# Patient Record
Sex: MALE | Race: WHITE | NOT HISPANIC OR LATINO | Employment: OTHER | URBAN - METROPOLITAN AREA
[De-identification: names, ages, dates, MRNs, and addresses within clinical notes are randomized per-mention and may not be internally consistent; named-entity substitution may affect disease eponyms.]

---

## 2017-06-07 ENCOUNTER — ALLSCRIPTS OFFICE VISIT (OUTPATIENT)
Dept: OTHER | Facility: OTHER | Age: 72
End: 2017-06-07

## 2017-06-09 ENCOUNTER — GENERIC CONVERSION - ENCOUNTER (OUTPATIENT)
Dept: OTHER | Facility: OTHER | Age: 72
End: 2017-06-09

## 2017-06-09 ENCOUNTER — APPOINTMENT (OUTPATIENT)
Dept: LAB | Facility: CLINIC | Age: 72
End: 2017-06-09
Payer: MEDICARE

## 2017-06-09 DIAGNOSIS — E78.00 PURE HYPERCHOLESTEROLEMIA: ICD-10-CM

## 2017-06-09 DIAGNOSIS — R73.09 OTHER ABNORMAL GLUCOSE: ICD-10-CM

## 2017-06-09 DIAGNOSIS — N40.0 ENLARGED PROSTATE WITHOUT LOWER URINARY TRACT SYMPTOMS (LUTS): ICD-10-CM

## 2017-06-09 DIAGNOSIS — I10 ESSENTIAL (PRIMARY) HYPERTENSION: ICD-10-CM

## 2017-06-09 DIAGNOSIS — Z12.5 ENCOUNTER FOR SCREENING FOR MALIGNANT NEOPLASM OF PROSTATE: ICD-10-CM

## 2017-06-09 LAB
ALBUMIN SERPL BCP-MCNC: 3.9 G/DL (ref 3.5–5)
ALP SERPL-CCNC: 81 U/L (ref 46–116)
ALT SERPL W P-5'-P-CCNC: 48 U/L (ref 12–78)
ANION GAP SERPL CALCULATED.3IONS-SCNC: 6 MMOL/L (ref 4–13)
AST SERPL W P-5'-P-CCNC: 24 U/L (ref 5–45)
BASOPHILS # BLD AUTO: 0.02 THOUSANDS/ΜL (ref 0–0.1)
BASOPHILS NFR BLD AUTO: 0 % (ref 0–1)
BILIRUB SERPL-MCNC: 0.74 MG/DL (ref 0.2–1)
BUN SERPL-MCNC: 12 MG/DL (ref 5–25)
CALCIUM SERPL-MCNC: 8.9 MG/DL (ref 8.3–10.1)
CHLORIDE SERPL-SCNC: 103 MMOL/L (ref 100–108)
CHOLEST SERPL-MCNC: 171 MG/DL (ref 50–200)
CO2 SERPL-SCNC: 30 MMOL/L (ref 21–32)
CREAT SERPL-MCNC: 1.07 MG/DL (ref 0.6–1.3)
EOSINOPHIL # BLD AUTO: 0.23 THOUSAND/ΜL (ref 0–0.61)
EOSINOPHIL NFR BLD AUTO: 4 % (ref 0–6)
ERYTHROCYTE [DISTWIDTH] IN BLOOD BY AUTOMATED COUNT: 13.2 % (ref 11.6–15.1)
GFR SERPL CREATININE-BSD FRML MDRD: >60 ML/MIN/1.73SQ M
GLUCOSE P FAST SERPL-MCNC: 110 MG/DL (ref 65–99)
HCT VFR BLD AUTO: 49.3 % (ref 36.5–49.3)
HDLC SERPL-MCNC: 35 MG/DL (ref 40–60)
HGB BLD-MCNC: 16.6 G/DL (ref 12–17)
LDLC SERPL CALC-MCNC: 118 MG/DL (ref 0–100)
LYMPHOCYTES # BLD AUTO: 1.42 THOUSANDS/ΜL (ref 0.6–4.47)
LYMPHOCYTES NFR BLD AUTO: 22 % (ref 14–44)
MCH RBC QN AUTO: 29.1 PG (ref 26.8–34.3)
MCHC RBC AUTO-ENTMCNC: 33.7 G/DL (ref 31.4–37.4)
MCV RBC AUTO: 87 FL (ref 82–98)
MONOCYTES # BLD AUTO: 0.55 THOUSAND/ΜL (ref 0.17–1.22)
MONOCYTES NFR BLD AUTO: 9 % (ref 4–12)
NEUTROPHILS # BLD AUTO: 4.16 THOUSANDS/ΜL (ref 1.85–7.62)
NEUTS SEG NFR BLD AUTO: 65 % (ref 43–75)
NRBC BLD AUTO-RTO: 0 /100 WBCS
PLATELET # BLD AUTO: 192 THOUSANDS/UL (ref 149–390)
PMV BLD AUTO: 10.6 FL (ref 8.9–12.7)
POTASSIUM SERPL-SCNC: 4 MMOL/L (ref 3.5–5.3)
PROT SERPL-MCNC: 7.6 G/DL (ref 6.4–8.2)
PSA SERPL-MCNC: 0.4 NG/ML (ref 0–4)
RBC # BLD AUTO: 5.7 MILLION/UL (ref 3.88–5.62)
SODIUM SERPL-SCNC: 139 MMOL/L (ref 136–145)
TRIGL SERPL-MCNC: 88 MG/DL
TSH SERPL DL<=0.05 MIU/L-ACNC: 2.32 UIU/ML (ref 0.36–3.74)
WBC # BLD AUTO: 6.41 THOUSAND/UL (ref 4.31–10.16)

## 2017-06-09 PROCEDURE — 80061 LIPID PANEL: CPT

## 2017-06-09 PROCEDURE — 85025 COMPLETE CBC W/AUTO DIFF WBC: CPT

## 2017-06-09 PROCEDURE — G0103 PSA SCREENING: HCPCS

## 2017-06-09 PROCEDURE — 84443 ASSAY THYROID STIM HORMONE: CPT

## 2017-06-09 PROCEDURE — 36415 COLL VENOUS BLD VENIPUNCTURE: CPT

## 2017-06-09 PROCEDURE — 80053 COMPREHEN METABOLIC PANEL: CPT

## 2017-12-07 ENCOUNTER — ALLSCRIPTS OFFICE VISIT (OUTPATIENT)
Dept: OTHER | Facility: OTHER | Age: 72
End: 2017-12-07

## 2017-12-07 DIAGNOSIS — R26.9 ABNORMALITY OF GAIT AND MOBILITY: ICD-10-CM

## 2017-12-07 DIAGNOSIS — M48.00 SPINAL STENOSIS: ICD-10-CM

## 2017-12-08 NOTE — PROGRESS NOTES
Assessment    1  Mass of skin of back (782 2) (R22 2)   2  Abscess of back (682 2) (L02 212)   3  Abnormal glucose (790 29) (R73 09)   4  Benign essential hypertension (401 1) (I10)   5  Hypercholesterolemia (272 0) (E78 00)   6  Spinal stenosis (724 00) (M48 00)   7  Gait disturbance (781 2) (R26 9)   8  Need for prophylactic vaccination and inoculation against influenza (V04 81) (Z23)    Plan  Abscess of back, Mass of skin of back    · Cephalexin 500 MG Oral Tablet (Cephalexin Monohydrate); TAKE 1 TABLET 3TIMES DAILY  Gait disturbance, Spinal stenosis    · * XR SPINE LUMBAR MINIMUM 4 VIEWS NON INJURY; Status:Active; Requestedfor:[id number]; Health Maintenance    · *VB - Urinary Incontinence Screen (Dx Z13 89 Screen for UI); Status:Complete;   Done:07Dec2017 09:26AM  Need for prophylactic vaccination and inoculation against influenza    · Fluzone High-Dose 0 5 ML Intramuscular Suspension Prefilled Syringe    * MRI Lumbar Spine Without Contrast; Status:Hold For - Scheduling; Requested for:11Nov2015;  Perform:[facility]; MSK:[id number];RNJCCEL; For:Gait disturbance, Osteoarthritis, Peripheral neuropathy, Spinal stenosis; Ordered By:Brandi Dubois;    Discussion/Summary    - Hypertension is very well-controlled on current dose of amlodipine/benazepril and metoprolol  Refills are not necessary at this time  Labs done in May were completely unremarkable  No need for labs at this time as he is not on medications that would influence his lab results  gait is significantly deteriorating  It is having an impact on his quality of life  At this time I believe that his gait dysfunction is related to a significant spinal stenosis  Patient believes that he would now be willing to take action if surgery were needed  Patient will start with x-rays of the lumbar spineHigh-dose flu vaccine provided in the office  did attempt to aspirate the skin lesion on his lower back which is 5 cm in size  There was no significant drainage  It does seem to be a cellulitic lesion that he does not have any overt pain or tenderness in this area  I will place the patient on Keflex 500 mg t i d  times 10 days  I do want to see him next week to re-evaluate the lesion  I did use a skin marker to Leonard the lesion to see if improvement occurs while on antibiotics  If there is no improvement then he would need referral to general surgeon for excision and biopsy of the lesion  The patient was counseled regarding instructions for management,-- prognosis,-- impressions,-- risks and benefits of treatment options  total time of encounter was 52 minutes-- and-- 36 minutes was spent counseling  Possible side effects of new medications were reviewed with the patient/guardian today  The treatment plan was reviewed with the patient/guardian  The patient/guardian understands and agrees with the treatment plan      Chief Complaint  Recheck and refills  Needs lab orders  Flu vaccine  Patient is here today for follow up of chronic conditions described in HPI  History of Present Illness  Patient presents with his wife for followup of chronic medical conditions including hypertension, hyperlipidemia and gait dysfunction  Patient has no particular complaints or concerns  Patient has lost weight  They do not eat very much  Patient has not been very active  His antalgic gait has worsened considerably  Patient normally spends most time on the sofa watching television  He does ambulate with the assistance of a cane  He cannot walk any significant distance due to his gait dysfunction  He denies having any lower back pain  No incontinence of bowel or bladder  He continues smoking cigars  No labs were performed recently  He would like to receive flu vaccination      Review of Systems   Constitutional: No fever or chills, feels well, no tiredness, no recent weight gain or weight loss  Eyes: No complaints of eye pain, no red eyes, no discharge from eyes, no itchy eyes    ENT: no complaints of earache, no hearing loss, no nosebleeds, no nasal discharge, no sore throat, no hoarseness  Cardiovascular: No complaints of slow heart rate, no fast heart rate, no chest pain, no palpitations, no leg claudication, no lower extremity  Respiratory: No complaints of shortness of breath, no wheezing, no cough, no SOB on exertion, no orthopnea or PND  Gastrointestinal: No complaints of abdominal pain, no constipation, no nausea or vomiting, no diarrhea or bloody stools  Genitourinary: No complaints of dysuria, no incontinence, no hesitancy, no nocturia, no genital lesion, no testicular pain  Musculoskeletal: No complaints of arthralgia, no myalgias, no joint swelling or stiffness, no limb pain or swelling  Neurological: limb weakness-- and-- difficulty walking, but-- no numbness-- and-- no tingling  Psychiatric: Is not suicidal, no sleep disturbances, no anxiety or depression, no change in personality, no emotional problems  Endocrine: No complaints of proptosis, no hot flashes, no muscle weakness, no erectile dysfunction, no deepening of the voice, no feelings of weakness  Hematologic/Lymphatic: No complaints of swollen glands, no swollen glands in the neck, does not bleed easily, no easy bruising  Preventive Quality 65 and Older: Falls Risk: The patient fell none times in the past 12 months  The patient is currently asymptomatic Symptoms Include:  Associated symptoms:  No associated symptoms are reported  The patient currently has no urinary incontinence symptoms  Over the past 2 weeks, how often have you been bothered by the following problems? 1 ) Little interest or pleasure in doing things? Not at all   2 ) Feeling down, depressed or hopeless? Not at all   3 ) Trouble falling asleep or sleeping too much? Not at all   4 ) Feeling tired or having little energy? Not at all   5 ) Poor appetite or overeating?  Not at all   6 ) Feeling bad about yourself, or that you are a failure, or have let yourself or your family down? Not at all   7 ) Trouble concentrating on things, such as reading a newspaper or watching television? Not at all   8 ) Moving or speaking so slowly that other people could have noticed, or the opposite, moving or speaking faster than usual? Not at all   9 ) Thoughts that you would be better off dead or of hurting yourself in some way? Not at all  Active Problems  1  Abnormal glucose (790 29) (R73 09)   2  Actinic keratosis (702 0) (L57 0)   3  Benign essential hypertension (401 1) (I10)   4  Benign prostatic hypertrophy without urinary obstruction (600 00) (N40 0)   5  Colon polyps (211 3) (K63 5)   6  Encounter for screening for malignant neoplasm of colon (V76 51) (Z12 11)   7  Erectile dysfunction of non-organic origin (302 72) (F52 21)   8  Gait disturbance (781 2) (R26 9)   9  Hypercholesterolemia (272 0) (E78 00)   10  Need for pneumococcal vaccination (V03 82) (Z23)   11  Need for prophylactic vaccination and inoculation against influenza (V04 81) (Z23)   12  Organic impotence (607 84) (N52 9)   13  Osteoarthritis (715 90) (M19 90)   14  Peripheral neuropathy (356 9) (G62 9)   15  Prostate cancer screening encounter, options and risks discussed (V76 44) (Z12 5)   16  Screening for abdominal aortic aneurysm (V81 2) (Z13 6)   17  Spinal stenosis (724 00) (M48 00)    Past Medical History  1  History of Cellulitis of neck (682 1) (L03 221)   2  History of Encounter for screening for malignant neoplasm of colon (V76 51) (Z12 11)   3  History of carbuncle of skin and subcutaneous tissue (V13 3) (Z87 2)   4  History of herpes zoster (V12 09) (Z86 19)   5  History of lipoma (V13 89) (Z86 018)   6  History of Impacted cerumen, unspecified laterality (380 4) (H61 20)   7  Need for prophylactic vaccination and inoculation against influenza (V04 81) (Z23)    The active problems and past medical history were reviewed and updated today  Surgical History  1   History of Cervical Vertebral Fusion    The surgical history was reviewed and updated today  Family History  Mother    1  No pertinent family history  Family History    2  Family history of Ischemic Stroke (V17 1)    The family history was reviewed and updated today  Social History     · Chewing Nicotine-containing Substances Tobacco   · Current Every Day Smoker (305 1)   · Marital History - Currently   The social history was reviewed and updated today  The social history was reviewed and is unchanged  Current Meds   1  Amlodipine Besy-Benazepril HCl - 10-40 MG Oral Capsule; 1 po daily, generic; Therapy: 17TCV0331 to (Last Rx:07Jun2017)  Requested for: 07Jun2017 Ordered   2  Aspirin 81 MG TABS; TAKE 1 TABLET DAILY; Therapy: 75MAJ3499 to (Evaluate:42Crx2935) Recorded   3  Metoprolol Tartrate 100 MG Oral Tablet; 1 two times a day; Therapy: 60UQH7160 to (Last Rx:07Jun2017)  Requested for: 07Jun2017 Ordered    The medication list was reviewed and updated today  Allergies  1  Advil TABS   2  Aspirin TABS    Vitals  Vital Signs    Recorded: 11QDW5064 09:22AM Recorded: 02ASE5224 09:21AM   Temperature 97 2 F    Heart Rate  58   Respiration  16   Systolic  063   Diastolic  72   Height  6 ft 2 in   Weight  202 lb    BMI Calculated  25 94   BSA Calculated  2 18   O2 Saturation  97       Physical Exam   Constitutional  General appearance: No acute distress, well appearing and well nourished  -- No distress whatsoever  Eyes  Conjunctiva and lids: No swelling, erythema, or discharge  Pupils and irises: Equal, round and reactive to light  Ears, Nose, Mouth, and Throat  External inspection of ears and nose: Normal    Otoscopic examination: Tympanic membrance translucent with normal light reflex  Canals patent without erythema  Nasal mucosa, septum, and turbinates: Normal without edema or erythema  Oropharynx: Normal with no erythema, edema, exudate or lesions     Pulmonary  Respiratory effort: No increased work of breathing or signs of respiratory distress  Auscultation of lungs: Clear to auscultation, equal breath sounds bilaterally, no wheezes, no rales, no rhonci  Cardiovascular  Palpation of heart: Normal PMI, no thrills  Auscultation of heart: Normal rate and rhythm, normal S1 and S2, without murmurs  Examination of extremities for edema and/or varicosities: Normal    Carotid pulses: Normal    Abdomen  Abdomen: Non-tender, no masses  Liver and spleen: No hepatomegaly or splenomegaly  Lymphatic  Palpation of lymph nodes in neck: No lymphadenopathy  Musculoskeletal  Gait and station: Abnormal   Gait evaluation demonstrated insufficiencies for exercise testing-- and-- steppage on the left, but-- no ataxia,-- no a wide-based and ataxic gait,-- no stooping,-- no shuffling,-- no inusfficiencies for an exercise program,-- no antalgia,-- no spasticity,-- no hemiparetic on the right,-- no hemiparetic on the left,-- no limping on the right,-- no limping on the left-- and-- weight bearing  Digits and nails: Normal without clubbing or cyanosis  Inspection/palpation of joints, bones, and muscles: Normal    Skin  Skin and subcutaneous tissue: Abnormal  -- Multiple actinic keratoses on the face and top of scalp  Patient with a large 3 cm lipoma on the anterior chest to the right of the sternum  Patient also has a 2 cm lipoma versus sebaceous cyst on his left lower back  Examination of the skin for lesions: Abnormal  -- When I lifted the patient sure to do his lung examination it was noted that he had a very large, 5 cm erythematous skin lesion that feels very solid in consistency  There is no overlying break in the skin  The lesion is blanchable  Neurologic  Cranial nerves: Cranial nerves 2-12 intact  Reflexes: 2+ and symmetric  Sensation: No sensory loss  Psychiatric  Orientation to person, place and time: Normal    Mood and affect: Normal    Additional Exam:   I did use an 18 gauge needle true try to aspirate at the center of the skin lesion on his back  A small amount of bloody drainage was removed  There was nothing purulent          Results/Data  Falls Risk Assessment (Dx Z13 89 Screen for Neurologic Disorder) 22SLJ4629 09:26AM User, Ahs     Test Name Result Flag Reference   Falls Risk      No falls in the past year     *VB - Urinary Incontinence Screen (Dx Z13 89 Screen for UI) [id number] 09:26AM Manoj Bishop     Test Name Result Flag Reference   Urinary Incontinence Assessment 89VWX0993         Future Appointments    Date/Time Provider Specialty Site   12/13/2017 09:40 AM Manoj Bishop DO [facility]       Signatures   Electronically signed by : Cody Robles DO; Dec  7 2017 10:51AM EST                       (Author)

## 2017-12-11 ENCOUNTER — HOSPITAL ENCOUNTER (OUTPATIENT)
Dept: RADIOLOGY | Facility: HOSPITAL | Age: 72
Discharge: HOME | End: 2017-12-11
Payer: MEDICARE

## 2017-12-11 ENCOUNTER — TRANSCRIBE ORDERS (OUTPATIENT)
Dept: ADMINISTRATIVE | Facility: HOSPITAL | Age: 72
End: 2017-12-11

## 2017-12-11 DIAGNOSIS — R26.9 ABNORMALITY OF GAIT AND MOBILITY: ICD-10-CM

## 2017-12-11 DIAGNOSIS — M48.00 SPINAL STENOSIS: ICD-10-CM

## 2017-12-11 PROCEDURE — 72110 X-RAY EXAM L-2 SPINE 4/>VWS: CPT

## 2017-12-13 ENCOUNTER — GENERIC CONVERSION - ENCOUNTER (OUTPATIENT)
Dept: OTHER | Facility: OTHER | Age: 72
End: 2017-12-13

## 2017-12-14 ENCOUNTER — GENERIC CONVERSION - ENCOUNTER (OUTPATIENT)
Dept: OTHER | Facility: OTHER | Age: 72
End: 2017-12-14

## 2017-12-14 ENCOUNTER — LAB CONVERSION - ENCOUNTER (OUTPATIENT)
Dept: OTHER | Facility: OTHER | Age: 72
End: 2017-12-14

## 2017-12-14 LAB — CULTURE RESULT (HISTORICAL): NORMAL

## 2017-12-18 ENCOUNTER — GENERIC CONVERSION - ENCOUNTER (OUTPATIENT)
Dept: OTHER | Facility: OTHER | Age: 72
End: 2017-12-18

## 2017-12-20 ENCOUNTER — LAB CONVERSION - ENCOUNTER (OUTPATIENT)
Dept: OTHER | Facility: OTHER | Age: 72
End: 2017-12-20

## 2017-12-20 ENCOUNTER — GENERIC CONVERSION - ENCOUNTER (OUTPATIENT)
Dept: OTHER | Facility: OTHER | Age: 72
End: 2017-12-20

## 2017-12-20 LAB — CULTURE RESULT (HISTORICAL): NORMAL

## 2017-12-22 ENCOUNTER — GENERIC CONVERSION - ENCOUNTER (OUTPATIENT)
Dept: OTHER | Facility: OTHER | Age: 72
End: 2017-12-22

## 2017-12-27 ENCOUNTER — GENERIC CONVERSION - ENCOUNTER (OUTPATIENT)
Dept: OTHER | Facility: OTHER | Age: 72
End: 2017-12-27

## 2018-01-02 ENCOUNTER — GENERIC CONVERSION - ENCOUNTER (OUTPATIENT)
Dept: OTHER | Facility: OTHER | Age: 73
End: 2018-01-02

## 2018-01-11 NOTE — RESULT NOTES
Discussion/Summary   Essentially normal screening labs  Fasting glucose is slightly elevated at 110  Please watch dietary intake of sweets and sugars  Excellent cholesterol  Recommend repeating blood work with annual physical in 1 year  Verified Results  (1) CBC/PLT/DIFF 50WEU5770 08:39AM [facility] Order Number: [id number]     Test Name Result Flag Reference   WBC COUNT 6 41 Thousand/uL  4 31-10 16   RBC COUNT 5 70 Million/uL H 3 88-5 62   HEMOGLOBIN 16 6 g/dL  12 0-17 0   HEMATOCRIT 49 3 %  36 5-49 3   MCV 87 fL  82-98   MCH 29 1 pg  26 8-34 3   MCHC 33 7 g/dL  31 4-37 4   RDW 13 2 %  11 6-15 1   MPV 10 6 fL  8 9-12 7   PLATELET COUNT 444 Thousands/uL  149-390   nRBC AUTOMATED 0 /100 WBCs     NEUTROPHILS RELATIVE PERCENT 65 %  43-75   LYMPHOCYTES RELATIVE PERCENT 22 %  14-44   MONOCYTES RELATIVE PERCENT 9 %  4-12   EOSINOPHILS RELATIVE PERCENT 4 %  0-6   BASOPHILS RELATIVE PERCENT 0 %  0-1   NEUTROPHILS ABSOLUTE COUNT 4 16 Thousands/? ??L  1 85-7 62   LYMPHOCYTES ABSOLUTE COUNT 1 42 Thousands/? ??L  0 60-4 47   MONOCYTES ABSOLUTE COUNT 0 55 Thousand/? ??L  0 17-1 22   EOSINOPHILS ABSOLUTE COUNT 0 23 Thousand/? ??L  0 00-0 61   BASOPHILS ABSOLUTE COUNT 0 02 Thousands/? ??L  0 00-0 10   - Patient Instructions: This bloodwork is non-fasting  Please drink two glasses of water morning of bloodwork       (1) COMPREHENSIVE METABOLIC PANEL 97LQN7610 39:25KV [facility] Order Number: [id number]     Test Name Result Flag Reference   SODIUM 139 mmol/L  136-145   POTASSIUM 4 0 mmol/L  3 5-5 3   CHLORIDE 103 mmol/L  100-108   CARBON DIOXIDE 30 mmol/L  21-32   ANION GAP (CALC) 6 mmol/L  4-13   BLOOD UREA NITROGEN 12 mg/dL  5-25   CREATININE 1 07 mg/dL  0 60-1 30   Standardized to IDMS reference method   CALCIUM 8 9 mg/dL  8 3-10 1   BILI, TOTAL 0 74 mg/dL  0 20-1 00   ALK PHOSPHATAS 81 U/L     ALT (SGPT) 48 U/L  12-78   AST(SGOT) 24 U/L  5-45   ALBUMIN 3 9 g/dL  3 5-5 0   TOTAL PROTEIN 7 6 g/dL  6 4-8 2   eGFR Non-African American      >60 0 ml/min/1 73sq m   [facility] Disease Education Program recommendations are as follows:  GFR calculation is accurate only with a steady state creatinine  Chronic Kidney disease less than 60 ml/min/1 73 sq  meters  Kidney failure less than 15 ml/min/1 73 sq  meters  GLUCOSE FASTING 110 mg/dL H 65-99     (1) TSH WITH FT4 REFLEX 38JLA6094 08:39AM Arturo Washington    Order Number: [id number]     Test Name Result Flag Reference   TSH 2 320 uIU/mL  0 358-3 740   Patients undergoing fluorescein dye angiography may retain small amounts of fluorescein in the body for 48-72 hours post procedure  Samples containing fluorescein can produce falsely depressed TSH values  If the patient had this procedure,a specimen should be resubmitted post fluorescein clearance  (1) LIPID PANEL, FASTING 09Jun2017 08:39AM [facility] Order Number: [id number]     Test Name Result Flag Reference   CHOLESTEROL 171 mg/dL     HDL,DIRECT 35 mg/dL L 40-60   Specimen collection should occur prior to Metamizole administration due to the potential for falsely depressed results  LDL CHOLESTEROL CALCULATED 118 mg/dL H 0-100   - Patient Instructions: This is a fasting blood test  Water,black tea or black  coffee only after 9:00pm the night before test   Drink 2 glasses of water the morning of test       Triglyceride:         Normal              <150 mg/dl       Borderline High    150-199 mg/dl       High               200-499 mg/dl       Very High          >499 mg/dl  Cholesterol:         Desirable        <200 mg/dl      Borderline High  200-239 mg/dl      High             >239 mg/dl  HDL Cholesterol:        High    >59 mg/dL      Low     <41 mg/dL  LDL CALCULATED:    This screening LDL is a calculated result  It does not have the accuracy of the Direct Measured LDL in the monitoring of patients with hyperlipidemia and/or statin therapy     Direct Measure LDL (BFJ486) must be ordered separately in these patients  TRIGLYCERIDES 88 mg/dL  <=150   Specimen collection should occur prior to N-Acetylcysteine or Metamizole administration due to the potential for falsely depressed results  (1) PSA (SCREEN) (Dx V76 44 Screen for Prostate Cancer) [id number] 08:39AM [facility] Number: [id number]     Test Name Result Flag Reference   PROSTATE SPECIFIC ANTIGEN 0 4 ng/mL  0 0-4 0   American U[employer] Association Guidelines define biochemical recurrence of prostate cancer as a detectable or rising PSA value post-radical prostatectomy that is greater than or equal to 0 2 ng/mL with a second confirmatory level of greater than or equal to 0 2 ng/mL  - Patient Instructions: This test is non-fasting  Please drink two glasses of water morning of bloodwork

## 2018-01-12 NOTE — PROGRESS NOTES
Assessment    1  Benign essential hypertension (401 1) (I10)   2  Prostate cancer screening encounter, options and risks discussed (V76 44) (Z12 5)   3  Hypercholesterolemia (272 0) (E78 0)   4  Abnormal glucose (790 29) (R73 09)   5  Spinal stenosis (724 00) (M48 00)   6  Screening for abdominal aortic aneurysm (V81 2) (Z13 6)   7  Screening for osteoporosis (V82 81) (Z13 820)   8  Colon polyps (211 3) (K63 5)   9  Encounter for screening for malignant neoplasm of colon (V76 51) (Z12 11)   10  Actinic keratosis (702 0) (L57 0)   11  Need for pneumococcal vaccination (V03 82) (Z23)    Plan  Abnormal glucose, Hypercholesterolemia, Prostate cancer screening encounter, options  and risks discussed    · (1) CBC/PLT/DIFF; Status:Active; Requested for:71Rgq5994;    · (1) COMPREHENSIVE METABOLIC PANEL; Status:Active; Requested for:80Evk9477;    · (1) HEMOGLOBIN A1C; Status:Active; Requested for:07Myk0439;    · (1) LIPID PANEL, FASTING; Status:Active; Requested for:88Luj1455;    · (1) PSA (SCREEN) (Dx V76 44 Screen for Prostate Cancer); Status:Active; Requested  for:14Drm3713; Actinic keratosis    · Larisa Alba MD, Tico Villatoro (Dermatology) Physician Referral  Consult  Status: Hold For - Scheduling   Requested for: 29FOB1784  Care Summary provided  : Yes  Benign essential hypertension    · Amlodipine Besy-Benazepril HCl - 10-40 MG Oral Capsule (Lotrel); 1 po daily,  generic   · Metoprolol Tartrate 100 MG Oral Tablet; 1 two times a day  Colon polyps, Encounter for screening for malignant neoplasm of colon    · 1 Jasmyn Whatley MD, Jacob Alvarenga (Gastroenterology) Physician Referral  Consult  Status: Active   Requested for: 92QUI0246  Care Summary provided  : Yes  Need for pneumococcal vaccination    · Prevnar 13 Intramuscular Suspension    Discussion/Summary    - Hypertension is very well-controlled on current dose of amlodipine/benazepril and metoprolol  Refills provided  - Order provided for repeat labs   Patient does have history of hyperlipidemia, impaired fasting glucose  -Patient's gait is significantly deteriorating  It is having an impact on his quality of life  At this time I believe that his gait dysfunction is related to a significant spinal stenosis  Order provided for MRI of the lumbar spine  Patient has had surgery in the past  He may be a good candidate for reevaluation by neurosurgery for other options as he is an otherwise healthy 43-year-old male  - Prevnar 13 vaccine provided in the office   - Referral to gastroenterology for screening colonoscopy  -Order provided for screening PSA  - Follow-up in 6 months  Impression: Initial Annual Wellness Visit, with preventive exam as well as age and risk appropriate counseling completed  Cardiovascular screening and counseling: screening is current  Diabetes screening and counseling: screening is current  Colorectal cancer screening and counseling: due for a colonoscopy (low risk)  Prostate cancer screening and counseling: screening is current and due for PSA  Osteoporosis screening and counseling: the patient declines screening  Abdominal aortic aneurysm screening and counseling: the patient declines screening  Glaucoma screening and counseling: ophthalmologist referral    HIV screening and counseling: screening not indicated  Chief Complaint  AWV      History of Present Illness  HPI: Patient presents with his wife for annual wellness visit no particular complaints or concerns  Hypertension has been very well-controlled  Patient does have history of actinic keratoses  He has not been seen by dermatology  Patient continues to have large natalya-weakness  No significant pain at all  He does have lumbar spinal stenosis but is content with ambulating with the assistance of a cane  Last colonoscopy was done in 2010 which time he did have a hyperplastic polyp  Patient is overdue for repeat colonoscopy      Welcome to Medicare and Wellness Visits:  The patient is being seen for the initial annual wellness visit  Medicare Screening and Risk Factors   Hospitalizations: no previous hospitalizations  Once per lifetime medicare screening tests: AAA screening US has not yet been done  Medicare Screening Tests Risk Questions   Osteoporosis risk assessment: none indicated  HIV risk assessment: none indicated  Drug and Alcohol Use: The patient has never smoked cigarettes  The patient reports never drinking alcohol  He has never used illicit drugs  Diet and Physical Activity: Current diet includes well balanced meals, 1 servings of fruit per day, 1 servings of vegetables per day, 1 servings of meat per day, 2 servings of whole grains per day, 1 servings of dairy products per day, 2 cups of coffee per day and 1 cans of regular soda per day  The patient does not exercise  Exercise: walking 10 minutes per day  Mood Disorder and Cognitive Impairment Screening: He denies feeling down, depressed, or hopeless over the past two weeks  He denies feeling little interest or pleasure in doing things over the past two weeks  Cognitive impairment screening: denies difficulty learning/retaining new information, denies difficulty handling complex tasks, denies difficulty with reasoning, denies difficulty with spatial ability and orientation, denies difficulty with language and denies difficulty with behavior  Functional Ability/Level of Safety: Hearing is slightly decreased, slightly decreased in the right ear, slightly decreased in the left ear and a hearing aid is not used  The patient is currently able to drive with limitations  Activities of daily living details: does not need help using the phone, no transportation help needed, does not need help shopping, no meal preparation help needed, does not need help doing housework, does not need help doing laundry, does not need help managing medications and does not need help managing money     Advance Directives: Advance directives: no living will, no durable power of  for health care directives and no advance directives  Co-Managers and Medical Equipment/Suppliers: See Patient Care Team      Patient Care Team    Care Team Member Role Specialty Office Number   Miesha Barrios 31 [phone]   Alie Washington MD  Cardiology [phone]     Review of Systems    Constitutional: negative  Eyes: negative  ENT: negative  Cardiovascular: negative  Respiratory: negative  Gastrointestinal: negative  Genitourinary: negative  Musculoskeletal: negative  Integumentary and Breasts: negative  Neurological: negative  Psychiatric: negative  Endocrine: negative  Hematologic and Lymphatic: negative  Active Problems    1  Abnormal glucose (790 29) (R73 09)   2  Actinic keratosis (702 0) (L57 0)   3  Benign essential hypertension (401 1) (I10)   4  Benign prostatic hypertrophy without urinary obstruction (600 00) (N40 0)   5  Callus (700) (L84)   6  Encounter for consultation (V65 9) (Z71 9)   7  Erectile dysfunction of non-organic origin (302 72) (F52 21)   8  Fatigue (780 79) (R53 83)   9  Gait disturbance (781 2) (R26 9)   10  Hypercholesterolemia (272 0) (E78 0)   11  Need for prophylactic vaccination and inoculation against influenza (V04 81) (Z23)   12  Organic impotence (607 84) (N52 9)   13  Osteoarthritis (715 90) (M19 90)   14  Peripheral neuropathy (356 9) (G62 9)   15  Prostate cancer screening encounter, options and risks discussed (V76 44) (Z12 5)   16  Screening for genitourinary condition (V81 6) (Z13 89)   17  Screening for neurological condition (V80 09) (Z13 89)   18   Spinal stenosis (724 00) (M48 00)    Past Medical History    · History of Cellulitis of neck (682 1) (K60 308)   · Encounter for consultation (V65 9) (Z71 9)   · History of Encounter for screening for malignant neoplasm of colon (V76 51) (Z12 11)   · History of carbuncle of skin and subcutaneous tissue (V13 3) (Z87 2)   · History of herpes zoster (V12 09) (Z86 19)   · History of lipoma (V13 89) (Z86 018)   · History of Impacted cerumen, unspecified laterality (380 4) (H61 20)   · Need for prophylactic vaccination and inoculation against influenza (V04 81) (Z23)    The active problems and past medical history were reviewed and updated today  Surgical History    · History of Cervical Vertebral Fusion    The surgical history was reviewed and updated today  Family History  Mother    · No pertinent family history  Family History    · Family history of Ischemic Stroke (V17 1)    The family history was reviewed and updated today  Social History    · Chewing Nicotine-containing Substances Tobacco   · Current Every Day Smoker (305 1)   · Marital History - Currently   The social history was reviewed and updated today  The social history was reviewed and is unchanged  Current Meds   1  Amlodipine Besy-Benazepril HCl - 10-40 MG Oral Capsule; 1 po daily, generic; Therapy: 21TOG1967 to (Last Rx:11Nov2015)  Requested for: 51ODN3735 Ordered   2  Aspirin 81 MG TABS; TAKE 1 TABLET DAILY; Therapy: 15QWT7889 to (Evaluate:43Oqo0562) Recorded   3  Metoprolol Tartrate 100 MG Oral Tablet; 1 two times a day; Therapy: 55WJZ3144 to (Last Rx:11Nov2015)  Requested for: 01LQR9921 Ordered    The medication list was reviewed and updated today  Allergies    1  Advil TABS   2  Aspirin TABS    Immunizations   ** Printed in Appendix #1 below  Vitals  Signs [Data Includes: Current Encounter]    Heart Rate: 72  Respiration: 14  Systolic: 480  Diastolic: 70  Height: 6 ft 2 in  Weight: 206 lb   BMI Calculated: 26 45  BSA Calculated: 2 2    Physical Exam    Constitutional   General appearance: No acute distress, well appearing and well nourished  Eyes   Conjunctiva and lids: No erythema, swelling or discharge  Pupils and irises: Equal, round, reactive to light  Ophthalmoscopic examination: Normal fundi and optic discs      Ears, Nose, Mouth, and Throat   External inspection of ears and nose: Normal     Otoscopic examination: Tympanic membranes translucent with normal light reflex  Canals patent without erythema  Hearing: Normal     Nasal mucosa, septum, and turbinates: Normal without edema or erythema  Lips, teeth, and gums: Normal, good dentition  Oropharynx: Normal with no erythema, edema, exudate or lesions  Neck   Neck: Supple, symmetric, trachea midline, no masses  Thyroid: Normal, no thyromegaly  Pulmonary   Respiratory effort: No increased work of breathing or signs of respiratory distress  Percussion of chest: Normal     Palpation of chest: Normal     Auscultation of lungs: Clear to auscultation  Cardiovascular   Palpation of heart: Normal PMI, no thrills  Auscultation of heart: Normal rate and rhythm, normal S1 and S2, no murmurs  Carotid pulses: 2+ bilaterally  Abdominal aorta: Normal     Femoral pulses: 2+ bilaterally  Pedal pulses: 2+ bilaterally  Examination of extremities for edema and/or varicosities: Normal     Chest   Chest: Abnormal     Abdomen   Abdomen: Non-tender, no masses  Liver and spleen: No hepatomegaly or splenomegaly  Examination for hernias: No hernias appreciated  Genitourinary   Digital rectal exam of prostate: Normal size, no masses  Lymphatic   Palpation of lymph nodes in neck: No lymphadenopathy  Palpation of lymph nodes in axillae: No lymphadenopathy  Palpation of lymph nodes in groin: No lymphadenopathy  Palpation of lymph nodes in other areas: No lymphadenopathy  Musculoskeletal   Gait and station: Abnormal   Gait evaluation demonstrated stooping and antalgia bilaterally  Inspection/palpation of digits and nails: Normal without clubbing or cyanosis      Inspection/palpation of joints, bones, and muscles: Normal     Range of motion: Normal     Stability: Normal     Muscle strength/tone: Normal     Skin   Skin and subcutaneous tissue: Normal without rashes or lesions  Examination of the skin for lesions: Abnormal   Multiple Actinic keratoses on the scalp, neck and bilateral forearms  Patient does have a large lipoma on the right anterior chest wall by the sternum  Patient does have a another large lipoma on the left to mid thoracic region of the back  Palpation of skin and subcutaneous tissue: Normal turgor  Neurologic   Cranial nerves: Cranial nerves 2-12 intact  Reflexes: 2+ and symmetric  Sensation: No sensory loss  Psychiatric   Judgment and insight: Normal     Orientation to person, place and time: Normal     Recent and remote memory: Intact  Mood and affect: Normal        Results/Data  PHQ-2 Adult Depression Screening 05KVY7765 09:11AM User, [employer]     Test Name Result Flag Reference   PHQ-2 Adult Depression Score 0     Over the last two weeks, how often have you been bothered by any of the following problems?   Little interest or pleasure in doing things: Not at all - 0  Feeling down, depressed, or hopeless: Not at all - 0   PHQ-2 Adult Depression Screening Negative         Future Appointments    Date/Time Provider Specialty Site   2016 09:20 AM Rigo Orozco DO Family Medicine [facility]     Signatures   Electronically signed by : Betito Hanson DO; May 18 2016 10:47AM EST                       (Author)    Appendix #1     Patient: Demi Randhawa SR ; : 1945; MRN: [id number]      1 2 3 4 5    Influenza  95FRP2543 00GQC7308 09GKP6111 07GSE8161 48PVN2885    Pneumococcal  30CBF2572

## 2018-01-12 NOTE — RESULT NOTES
Verified Results  (1) CBC/PLT/DIFF 56BEW3364 08:07AM Dirk Sung Order Number: [id number]   Order Number: [id number]     Test Name Result Flag Reference   WBC COUNT 6 02 Thousand/uL  4 31-10 16   RBC COUNT 5 55 Million/uL  3 88-5 62   HEMOGLOBIN 16 5 g/dL  12 0-17 0   HEMATOCRIT 48 4 %  36 5-49 3   MCV 87 fL  82-98   MCH 29 7 pg  26 8-34 3   MCHC 34 1 g/dL  31 4-37 4   RDW 13 3 %  11 6-15 1   MPV 10 8 fL  8 9-12 7   PLATELET COUNT 560 Thousands/uL  149-390   nRBC AUTOMATED 0 /100 WBCs     NEUTROPHILS RELATIVE PERCENT 66 %  43-75   LYMPHOCYTES RELATIVE PERCENT 22 %  14-44   MONOCYTES RELATIVE PERCENT 9 %  4-12   EOSINOPHILS RELATIVE PERCENT 3 %  0-6   BASOPHILS RELATIVE PERCENT 0 %  0-1   NEUTROPHILS ABSOLUTE COUNT 3 88 Thousands/?L  1 85-7 62   LYMPHOCYTES ABSOLUTE COUNT 1 35 Thousands/?L  0 60-4 47   MONOCYTES ABSOLUTE COUNT 0 56 Thousand/?L  0 17-1 22   EOSINOPHILS ABSOLUTE COUNT 0 17 Thousand/?L  0 00-0 61   BASOPHILS ABSOLUTE COUNT 0 02 Thousands/?L  0 00-0 10     (1) COMPREHENSIVE METABOLIC PANEL 91XDU5419 91:22US Dirk Sung Order Number: [id number]   Order Number: [id number] Order Number: [id number]     Test Name Result Flag Reference   GLUCOSE,RANDM 92 mg/dL     If the patient is fasting, the ADA then defines impaired fasting glucose as > 100 mg/dL and diabetes as > or equal to 123 mg/dL     SODIUM 139 mmol/L  136-145   POTASSIUM 4 9 mmol/L  3 5-5 3   CHLORIDE 104 mmol/L  100-108   CARBON DIOXIDE 29 mmol/L  21-32   ANION GAP (CALC) 6 mmol/L  4-13   BLOOD UREA NITROGEN 19 mg/dL  5-25   CREATININE 0 99 mg/dL  0 60-1 30   Standardized to IDMS reference method   CALCIUM 8 3 mg/dL  8 3-10 1   BILI, TOTAL 0 67 mg/dL  0 20-1 00   ALK PHOSPHATAS 75 U/L     ALT (SGPT) 38 U/L  12-78   AST(SGOT) 20 U/L  5-45   ALBUMIN 3 6 g/dL  3 5-5 0   TOTAL PROTEIN 6 8 g/dL  6 4-8 2   eGFR Non-African American      >60 0 ml/min/1 73sq m   [employer] [facility] Education [facility] recommendations are as follows:  GFR calculation is accurate only with a steady state creatinine  Chronic Kidney disease less than 60 ml/min/1 73 sq  meters  Kidney failure less than 15 ml/min/1 73 sq  meters  (1) HEMOGLOBIN A1C 60Rtg5966 08:07AM Katlin Nails Order Number: [id number]  TW Order Number: [id number]     Test Name Result Flag Reference   HEMOGLOBIN A1C 6 1 %  4 2-6 3   EST  AVG  GLUCOSE 128 mg/dl       (1) PSA (SCREEN) (Dx V76 44 Screen for Prostate Cancer) 32VWN5947 08:07AM Katlin Nails Order Number: [id number]  TW Order Number: [id number]     Test Name Result Flag Reference   PROSTATE SPECIFIC ANTIGEN 0 4 ng/mL  0 0-4 0     (1) LIPID PANEL, FASTING 13WJY9256 08:07AM Katlin Nails Order Number: [id number]  TW Order Number: [id number] Order Number: [id number]     Test Name Result Flag Reference   CHOLESTEROL 146 mg/dL     HDL,DIRECT 32 mg/dL L 40-60   Specimen collection should occur prior to Metamizole administration due to the potential for falsely depressed results  LDL CHOLESTEROL CALCULATED 93 mg/dL  0-100   Triglyceride:         Normal              <150 mg/dl       Borderline High    150-199 mg/dl       High               200-499 mg/dl       Very High          >499 mg/dl  Cholesterol:         Desirable        <200 mg/dl      Borderline High  200-239 mg/dl      High             >239 mg/dl  HDL Cholesterol:        High    >59 mg/dL      Low     <41 mg/dL  LDL CALCULATED:    This screening LDL is a calculated result  It does not have the accuracy of the Direct Measured LDL in the monitoring of patients with hyperlipidemia and/or statin therapy  Direct Measure LDL (ZSA601) must be ordered separately in these patients     TRIGLYCERIDES 103 mg/dL  <=150   Specimen collection should occur prior to N-Acetylcysteine or Metamizole administration due to the potential for falsely depressed results  Discussion/Summary   Screening labs are essentially normal  Cholesterol is well controlled, diabetes is well controlled  Slightly low good cholesterol  This will only increase with physical activity if possible and when he stops using his cigars  Follow-up in November as scheduled

## 2018-01-16 NOTE — PROGRESS NOTES
Plan  Benign essential hypertension    · Amlodipine Besy-Benazepril HCl - 10-40 MG Oral Capsule (Lotrel); 1 po daily,  generic   · Metoprolol Tartrate 100 MG Oral Tablet; 1 two times a day    Discussion/Summary    - Hypertension is very well-controlled on current dose of amlodipine/benazepril and metoprolol  Refills provided  - Order provided for repeat labs  Patient does have history of hyperlipidemia, impaired fasting glucose  -Patient's gait is significantly deteriorating  It is having an impact on his quality of life  At this time I believe that his gait dysfunction is related to a significant spinal stenosis  Order provided for MRI of the lumbar spine  Patient has had surgery in the past  He may be a good candidate for reevaluation by neurosurgery for other options as he is an otherwise healthy 57-year-old male  - Current on all immunizations  - Follow-up in 6 months for reevaluation of hypertension barring any significant abnormalities on blood work  Impression: Subsequent Annual Wellness Visit, with preventive exam as well as age and risk appropriate counseling completed  Cardiovascular screening and counseling: screening is current  Diabetes screening and counseling: screening is current  Colorectal cancer screening and counseling: due for a colonoscopy (low risk)  Prostate cancer screening and counseling: screening is current and due for PSA  Osteoporosis screening and counseling: the patient declines screening  Abdominal aortic aneurysm screening and counseling: the patient declines screening  Glaucoma screening and counseling: ophthalmologist referral    HIV screening and counseling: screening not indicated  Chief Complaint  AWV      History of Present Illness  HPI: Patient presents with his wife for annual wellness visit no particular complaints or concerns  Hypertension has been very well-controlled on current medication regimen  Patient does have history of actinic keratoses  Patient recently picked up a rolling walker and states that his ambulation is much improved with this  He still tends to have weakness of the left lower extremity  Patient did not have labs completed prior to this appointment  He has never had any significant abnormality on any of his blood work  He continues to chew cigars on a daily basis  He does not smoke from  He only chews to   Welcome to [employer] and Wellness Visits: The patient is being seen for the subsequent annual wellness visit  Medicare Screening and Risk Factors   Hospitalizations: no previous hospitalizations  Once per lifetime medicare screening tests: AAA screening US has not yet been done  Medicare Screening Tests Risk Questions   Osteoporosis risk assessment: none indicated  HIV risk assessment: none indicated  Drug and Alcohol Use: The patient has never smoked cigarettes  The patient reports never drinking alcohol  He has never used illicit drugs  Diet and Physical Activity: Current diet includes well balanced meals, 1 servings of fruit per day, 1 servings of vegetables per day, 1 servings of meat per day, 2 servings of whole grains per day, 1 servings of dairy products per day, 2 cups of coffee per day and 1 cans of regular soda per day  The patient does not exercise  Exercise: walking 10 minutes per day  Mood Disorder and Cognitive Impairment Screening: He denies feeling down, depressed, or hopeless over the past two weeks  He denies feeling little interest or pleasure in doing things over the past two weeks  Cognitive impairment screening: denies difficulty learning/retaining new information, denies difficulty handling complex tasks, denies difficulty with reasoning, denies difficulty with spatial ability and orientation, denies difficulty with language and denies difficulty with behavior     Functional Ability/Level of Safety: Hearing is slightly decreased, slightly decreased in the right ear, slightly decreased in the left ear and a hearing aid is not used  The patient is currently able to drive with limitations  Activities of daily living details: does not need help using the phone, no transportation help needed, does not need help shopping, no meal preparation help needed, does not need help doing housework, does not need help doing laundry, does not need help managing medications and does not need help managing money  Advance Directives: Advance directives: no living will, no durable power of  for health care directives and no advance directives  Co-Managers and Medical Equipment/Suppliers: See Patient Care Team   Preventive Quality Program 65 and Older: Falls Risk: The patient fell none times in the past 12 months  The patient is currently asymptomatic Symptoms Include:  Associated symptoms:  No associated symptoms are reported no pain from the injury  The patient currently has no urinary incontinence symptoms  Patient Care Team    Care Team Member Role Specialty Office Number   Miesha Eddy 31 [phone]   Erasmo Bradley MD  Cardiology [phone]     Review of Systems    Constitutional: negative  Eyes: negative  ENT: negative  Cardiovascular: negative  Respiratory: negative  Gastrointestinal: negative  Genitourinary: negative  Musculoskeletal: negative  Integumentary and Breasts: negative  Neurological: negative  Psychiatric: negative  Endocrine: negative  Hematologic and Lymphatic: negative  Active Problems    1  Abnormal glucose (790 29) (R73 09)   2  Actinic keratosis (702 0) (L57 0)   3  Benign essential hypertension (401 1) (I10)   4  Benign prostatic hypertrophy without urinary obstruction (600 00) (N40 0)   5  Colon polyps (211 3) (K63 5)   6  Encounter for screening for malignant neoplasm of colon (V76 51) (Z12 11)   7  Erectile dysfunction of non-organic origin (302 72) (F52 21)   8  Gait disturbance (781 2) (R26 9)   9   Hypercholesterolemia (272 0) (E78 00)   10  Need for pneumococcal vaccination (V03 82) (Z23)   11  Need for prophylactic vaccination and inoculation against influenza (V04 81) (Z23)   12  Organic impotence (607 84) (N52 9)   13  Osteoarthritis (715 90) (M19 90)   14  Peripheral neuropathy (356 9) (G62 9)   15  Prostate cancer screening encounter, options and risks discussed (V76 44) (Z12 5)   16  Screening for abdominal aortic aneurysm (V81 2) (Z13 6)   17  Spinal stenosis (724 00) (M48 00)    Past Medical History    · History of Cellulitis of neck (682 1) (T81 728)   · History of Encounter for screening for malignant neoplasm of colon (V76 51) (Z12 11)   · History of carbuncle of skin and subcutaneous tissue (V13 3) (Z87 2)   · History of herpes zoster (V12 09) (Z86 19)   · History of lipoma (V13 89) (Z86 018)   · History of Impacted cerumen, unspecified laterality (380 4) (H61 20)   · Need for prophylactic vaccination and inoculation against influenza (V04 81) (Z23)    The active problems and past medical history were reviewed and updated today  Surgical History    · History of Cervical Vertebral Fusion    The surgical history was reviewed and updated today  Family History  Mother    · No pertinent family history  Family History    · Family history of Ischemic Stroke (V17 1)    The family history was reviewed and updated today  Social History    · Chewing Nicotine-containing Substances Tobacco   · Current Every Day Smoker (305 1)   · Marital History - Currently   The social history was reviewed and updated today  The social history was reviewed and is unchanged  Current Meds   1  Amlodipine Besy-Benazepril HCl - 10-40 MG Oral Capsule; 1 po daily, generic; Therapy: 68BTF2772 to (Last Rx:[id number])  Requested for: 12OXH0017 Ordered   2  Aspirin 81 MG TABS; TAKE 1 TABLET DAILY; Therapy: 50QVE6006 to (Evaluate:42Pqj3016) Recorded   3  Metoprolol Tartrate 100 MG Oral Tablet; 1 two times a day;    Therapy: 69JSU6424 to (Last Rx:12Ebw0964)  Requested for: 12CAJ9180 Ordered    The medication list was reviewed and updated today  Allergies    1  Advil TABS   2  Aspirin TABS    Immunizations   ** Printed in Appendix #1 below  Vitals  Signs    Temperature: 96 7 F  Heart Rate: 54  Respiration: 16  Systolic: 157  Diastolic: 64  Height: 6 ft 2 in  Weight: 213 lb   BMI Calculated: 27 35  BSA Calculated: 2 23  O2 Saturation: 98    Physical Exam    Constitutional   General appearance: No acute distress, well appearing and well nourished  Eyes   Conjunctiva and lids: No erythema, swelling or discharge  Pupils and irises: Equal, round, reactive to light  Ophthalmoscopic examination: Normal fundi and optic discs  Ears, Nose, Mouth, and Throat   External inspection of ears and nose: Normal     Otoscopic examination: Tympanic membranes translucent with normal light reflex  Canals patent without erythema  Hearing: Normal     Nasal mucosa, septum, and turbinates: Normal without edema or erythema  Lips, teeth, and gums: Normal, good dentition  Oropharynx: Normal with no erythema, edema, exudate or lesions  Neck   Neck: Supple, symmetric, trachea midline, no masses  Thyroid: Normal, no thyromegaly  Pulmonary   Respiratory effort: No increased work of breathing or signs of respiratory distress  Percussion of chest: Normal     Palpation of chest: Normal     Auscultation of lungs: Clear to auscultation  Cardiovascular   Palpation of heart: Normal PMI, no thrills  Auscultation of heart: Normal rate and rhythm, normal S1 and S2, no murmurs  Carotid pulses: 2+ bilaterally  Abdominal aorta: Normal     Femoral pulses: 2+ bilaterally  Pedal pulses: 2+ bilaterally  Examination of extremities for edema and/or varicosities: Normal     Chest   Chest: Abnormal   7 cm lipoma on the right anterior chest wall overlying the right pectoralis major muscle which has not changed in size     Abdomen   Abdomen: Non-tender, no masses  Liver and spleen: No hepatomegaly or splenomegaly  Examination for hernias: No hernias appreciated  Genitourinary   Digital rectal exam of prostate: Normal size, no masses  Lymphatic   Palpation of lymph nodes in neck: No lymphadenopathy  Palpation of lymph nodes in axillae: No lymphadenopathy  Palpation of lymph nodes in groin: No lymphadenopathy  Palpation of lymph nodes in other areas: No lymphadenopathy  Musculoskeletal   Gait and station: Abnormal   Gait evaluation demonstrated stooping and antalgia bilaterally  Inspection/palpation of digits and nails: Normal without clubbing or cyanosis  Inspection/palpation of joints, bones, and muscles: Normal     Range of motion: Normal     Stability: Normal     Muscle strength/tone: Normal     Skin   Skin and subcutaneous tissue: Normal without rashes or lesions  Examination of the skin for lesions: Abnormal   Multiple Actinic keratoses on the scalp, neck and bilateral forearms  Patient does have a large lipoma on the right anterior chest wall by the sternum  Patient does have a another large lipoma on the left to mid thoracic region of the back  Palpation of skin and subcutaneous tissue: Normal turgor  Neurologic   Cranial nerves: Cranial nerves 2-12 intact  Reflexes: 2+ and symmetric  Sensation: No sensory loss  Psychiatric   Judgment and insight: Normal     Orientation to person, place and time: Normal     Recent and remote memory: Intact  Mood and affect: Normal        Results/Data  PHQ-2 Adult Depression Screening 07Jun2017 09:30AM User, Ahs     Test Name Result Flag Reference   PHQ-2 Adult Depression Score 0     Over the last two weeks, how often have you been bothered by any of the following problems?   Little interest or pleasure in doing things: Not at all - 0  Feeling down, depressed, or hopeless: Not at all - 0   PHQ-2 Adult Depression Screening Negative         Future Appointments    Date/Time Provider Specialty Site   2017 10:00 AM Reinier Kothari DO [facility]     Signatures   Electronically signed by : Phi Booth DO; 2017 10:21AM EST                       (Author)    Appendix #1     Patient: Jerzy Brenner SR ; : 1945; MRN: [id number]      1 2 3 4 5 6    Influenza  30-Sep-2010 07-Sep-2011 06-Nov-2013 14-Oct-2014 11-Nov-2015 22-Nov-2016    PCV  18-May-2016         PPSV  2010

## 2018-01-22 VITALS
RESPIRATION RATE: 16 BRPM | SYSTOLIC BLOOD PRESSURE: 126 MMHG | BODY MASS INDEX: 27.34 KG/M2 | OXYGEN SATURATION: 98 % | HEART RATE: 54 BPM | WEIGHT: 213 LBS | TEMPERATURE: 96.7 F | DIASTOLIC BLOOD PRESSURE: 64 MMHG | HEIGHT: 74 IN

## 2018-01-23 VITALS
RESPIRATION RATE: 16 BRPM | HEIGHT: 74 IN | DIASTOLIC BLOOD PRESSURE: 72 MMHG | HEART RATE: 58 BPM | OXYGEN SATURATION: 97 % | WEIGHT: 202 LBS | SYSTOLIC BLOOD PRESSURE: 136 MMHG | BODY MASS INDEX: 25.93 KG/M2 | TEMPERATURE: 97.2 F

## 2018-01-23 NOTE — RESULT NOTES
Verified Results  * XR SPINE LUMBAR MINIMUM 4 VIEWS NON INJURY [id number] 08:44AM Deborah Estrada Order Number: [id number]     Test Name Result Flag Reference   XR SPINE LUMBAR MINIMUM 4 VIEWS (Report)     LUMBAR SPINE     INDICATION: Lower back pain  COMPARISON: None     VIEWS: AP, lateral, bilateral oblique and coned down projections     IMAGES: 5     FINDINGS:     Alignment is unremarkable  There is mild anterior wedging of the T12 vertebral body which is unchanged from the prior chest x-ray of 10/17/2006  No acute fracture  There is multilevel discogenic degenerative change  Greatest degree of narrowing is at L5-S1 followed by L4-L5  Endplate spurring is seen  There is facet joint arthritis at L4-L5 and L5-S1     Atherosclerotic vascular calcifications are noted  Visualized soft tissues appear otherwise unremarkable  IMPRESSION:     Degenerative disc disease and facet joint arthritis of the lumbar spine   No subluxation or acute fracture       Workstation performed: [id number]     Signed by:   Richa Swain MD   12/14/17

## 2018-01-23 NOTE — RESULT NOTES
Verified Results  (Q) CULTURE, AEROBIC BACTERIA [id number] 01:24PM Deatrice Mean     Test Name Result Flag Reference   CULTURE, AEROBIC BACTERIA      CULTURE, AEROBIC BACTERIA       MICRO NUMBER:   [id number]   TEST STATUS:    FINAL   SPECIMEN SOURCE:  CYST, SEBACEOUS   SPECIMEN QUALITY: ADEQUATE   RESULT:      No Growth

## 2018-01-24 VITALS
SYSTOLIC BLOOD PRESSURE: 122 MMHG | BODY MASS INDEX: 25.93 KG/M2 | HEART RATE: 70 BPM | HEIGHT: 74 IN | DIASTOLIC BLOOD PRESSURE: 68 MMHG | WEIGHT: 202 LBS

## 2018-01-24 VITALS
HEART RATE: 68 BPM | DIASTOLIC BLOOD PRESSURE: 72 MMHG | RESPIRATION RATE: 16 BRPM | SYSTOLIC BLOOD PRESSURE: 126 MMHG | WEIGHT: 202 LBS | HEIGHT: 74 IN | TEMPERATURE: 97.6 F | BODY MASS INDEX: 25.93 KG/M2

## 2018-01-24 VITALS
DIASTOLIC BLOOD PRESSURE: 74 MMHG | WEIGHT: 202 LBS | BODY MASS INDEX: 25.94 KG/M2 | TEMPERATURE: 97 F | HEART RATE: 64 BPM | SYSTOLIC BLOOD PRESSURE: 128 MMHG | RESPIRATION RATE: 16 BRPM

## 2018-01-24 VITALS
RESPIRATION RATE: 16 BRPM | BODY MASS INDEX: 25.93 KG/M2 | SYSTOLIC BLOOD PRESSURE: 126 MMHG | WEIGHT: 202 LBS | DIASTOLIC BLOOD PRESSURE: 84 MMHG | TEMPERATURE: 97.4 F | HEIGHT: 74 IN

## 2018-01-24 VITALS
WEIGHT: 202 LBS | HEIGHT: 74 IN | SYSTOLIC BLOOD PRESSURE: 134 MMHG | TEMPERATURE: 97.2 F | BODY MASS INDEX: 25.93 KG/M2 | RESPIRATION RATE: 16 BRPM | HEART RATE: 60 BPM | DIASTOLIC BLOOD PRESSURE: 78 MMHG

## 2018-03-05 DIAGNOSIS — M19.90 CHRONIC ARTHRITIS: Primary | ICD-10-CM

## 2018-03-05 RX ORDER — MELOXICAM 7.5 MG/1
1 TABLET ORAL 2 TIMES DAILY
COMMUNITY
Start: 2018-01-02 | End: 2018-03-05 | Stop reason: SDUPTHER

## 2018-03-05 RX ORDER — MELOXICAM 7.5 MG/1
7.5 TABLET ORAL DAILY
Qty: 90 TABLET | Refills: 0 | Status: SHIPPED | OUTPATIENT
Start: 2018-03-05 | End: 2018-06-07 | Stop reason: SDUPTHER

## 2018-05-08 RX ORDER — METOPROLOL TARTRATE 100 MG/1
TABLET ORAL 2 TIMES DAILY
COMMUNITY
Start: 2010-03-30 | End: 2019-01-23 | Stop reason: SDUPTHER

## 2018-05-08 RX ORDER — AMLODIPINE BESYLATE AND BENAZEPRIL HYDROCHLORIDE 10; 40 MG/1; MG/1
CAPSULE ORAL
COMMUNITY
Start: 2010-03-30 | End: 2019-01-23 | Stop reason: SDUPTHER

## 2018-05-08 RX ORDER — SULFAMETHOXAZOLE AND TRIMETHOPRIM 800; 160 MG/1; MG/1
1 TABLET ORAL 2 TIMES DAILY
COMMUNITY
Start: 2017-12-13 | End: 2018-05-09

## 2018-05-09 ENCOUNTER — OFFICE VISIT (OUTPATIENT)
Dept: FAMILY MEDICINE CLINIC | Facility: CLINIC | Age: 73
End: 2018-05-09
Payer: MEDICARE

## 2018-05-09 VITALS
DIASTOLIC BLOOD PRESSURE: 78 MMHG | SYSTOLIC BLOOD PRESSURE: 142 MMHG | BODY MASS INDEX: 26.82 KG/M2 | RESPIRATION RATE: 16 BRPM | WEIGHT: 209 LBS | HEIGHT: 74 IN | HEART RATE: 80 BPM

## 2018-05-09 DIAGNOSIS — M48.062 SPINAL STENOSIS OF LUMBAR REGION WITH NEUROGENIC CLAUDICATION: Primary | ICD-10-CM

## 2018-05-09 DIAGNOSIS — R26.9 GAIT DISTURBANCE: ICD-10-CM

## 2018-05-09 PROCEDURE — 99213 OFFICE O/P EST LOW 20 MIN: CPT | Performed by: FAMILY MEDICINE

## 2018-05-09 NOTE — PROGRESS NOTES
Subjective:      Patient ID: Mehrdad Urena  is a 74 y o  male  Patient presents requesting a lumbar support brace  He saw all and info martial about a  Decompressive lumbar support brace from Dr Arian Sprague  Patient states that he contacted the manufacture in the said that with a prescription this would be covered under Medicare  Patient has had a longstanding history of significant  Gait dysfunction and spinal stenosis  In the past the patient has always been content with his quality of life ambulating with a cane  Patient denies having any significant amount of back pain but he does have great difficulty with ambulating  He finds that he needs to take frequent stops and lean forward  Past Medical History:   Diagnosis Date    Lipoma     last assessed: 1/23/2015       Family History   Problem Relation Age of Onset    No Known Problems Mother     Stroke Family      ischemic       Past Surgical History:   Procedure Laterality Date    CERVICAL FUSION      vertebral        reports that he has been smoking  His smokeless tobacco use includes Chew  Current Outpatient Prescriptions:     amLODIPine-benazepril (LOTREL) 10-40 MG per capsule, Take by mouth, Disp: , Rfl:     aspirin 81 MG tablet, Take 1 tablet by mouth daily, Disp: , Rfl:     meloxicam (MOBIC) 7 5 mg tablet, Take 1 tablet (7 5 mg total) by mouth daily, Disp: 90 tablet, Rfl: 0    metoprolol tartrate (LOPRESSOR) 100 mg tablet, Take by mouth 2 (two) times a day, Disp: , Rfl:     The following portions of the patient's history were reviewed and updated as appropriate: allergies, current medications, past family history, past medical history, past social history, past surgical history and problem list     Review of Systems   Cardiovascular: Negative for leg swelling  Genitourinary: Negative for difficulty urinating  No incontinence   Musculoskeletal: Positive for gait problem  Negative for back pain and myalgias  Neurological: Positive for weakness ( weakness of gait)  Objective:    /78   Pulse 80   Resp 16   Ht 6' 2" (1 88 m)   Wt 94 8 kg (209 lb)   BMI 26 83 kg/m²      Physical Exam   Constitutional: He appears well-developed and well-nourished  No distress  Cardiovascular: Normal rate and regular rhythm  Neurological: He is alert  He displays abnormal reflex  He exhibits abnormal muscle tone  Gait abnormal    Reflex Scores:       Patellar reflexes are 1+ on the right side  Achilles reflexes are 1+ on the right side and 1+ on the left side  No results found for this or any previous visit (from the past 1008 hour(s))  Assessment/Plan:    Spinal stenosis    Patient has had progression of his spinal stenosis clinically  I did write a prescription for a LSO brace however I informed the patient that this would be of little benefit in the long run  Patient will need surgical decompression  I did provide referral to neuro surgery for opinion and evaluation  Problem List Items Addressed This Visit     Spinal stenosis - Primary       Patient has had progression of his spinal stenosis clinically  I did write a prescription for a LSO brace however I informed the patient that this would be of little benefit in the long run  Patient will need surgical decompression  I did provide referral to neuro surgery for opinion and evaluation           Relevant Orders    Lso Back Brace    Ambulatory referral to Neurosurgery    Gait disturbance    Relevant Orders    Lso Back Brace    Ambulatory referral to Neurosurgery

## 2018-05-09 NOTE — ASSESSMENT & PLAN NOTE
Patient has had progression of his spinal stenosis clinically  I did write a prescription for a LSO brace however I informed the patient that this would be of little benefit in the long run  Patient will need surgical decompression  I did provide referral to neuro surgery for opinion and evaluation

## 2018-05-30 ENCOUNTER — APPOINTMENT (OUTPATIENT)
Dept: RADIOLOGY | Facility: CLINIC | Age: 73
End: 2018-05-30
Payer: MEDICARE

## 2018-05-30 ENCOUNTER — OFFICE VISIT (OUTPATIENT)
Dept: NEUROSURGERY | Facility: CLINIC | Age: 73
End: 2018-05-30
Payer: MEDICARE

## 2018-05-30 VITALS
SYSTOLIC BLOOD PRESSURE: 147 MMHG | BODY MASS INDEX: 27.08 KG/M2 | RESPIRATION RATE: 16 BRPM | HEART RATE: 55 BPM | WEIGHT: 211 LBS | HEIGHT: 74 IN | DIASTOLIC BLOOD PRESSURE: 77 MMHG

## 2018-05-30 DIAGNOSIS — R26.9 GAIT DISTURBANCE: ICD-10-CM

## 2018-05-30 DIAGNOSIS — M48.062 SPINAL STENOSIS OF LUMBAR REGION WITH NEUROGENIC CLAUDICATION: ICD-10-CM

## 2018-05-30 DIAGNOSIS — M47.12 OTHER SPONDYLOSIS WITH MYELOPATHY, CERVICAL REGION: ICD-10-CM

## 2018-05-30 DIAGNOSIS — M47.12 OTHER SPONDYLOSIS WITH MYELOPATHY, CERVICAL REGION: Primary | ICD-10-CM

## 2018-05-30 PROCEDURE — 72052 X-RAY EXAM NECK SPINE 6/>VWS: CPT

## 2018-05-30 PROCEDURE — 99205 OFFICE O/P NEW HI 60 MIN: CPT | Performed by: NEUROLOGICAL SURGERY

## 2018-05-30 NOTE — LETTER
May 30, 2018     Simónmirella JohnstonDO  [address]  [place] [address]    Patient: Claude Rasmussen YOB: 1945   Date of Visit: 5/30/2018       Dear Dr Cecilia Urbina:    Thank you for referring Lucio Paige to me for evaluation  Below are my notes for this consultation  If you have questions, please do not hesitate to call me  I look forward to following your patient along with you  Sincerely,        Yoko Crawford MD        CC: No Recipients  Yoko Crawford MD  5/30/2018  9:37 AM  Sign at close encounter  Office Note - Neurosurgery   Claude Rasmussen  74 y o  male MRN: [id number]      Assessment:    Patient is gradually worsening  64-year-old gentleman with progressive gait difficulty concerning for thoracic or lower cervical myelopathy  He is myelopathic and his lower extremities on physical examination and seems to have a midthoracic sensory level  Will obtain plain film of the cervical spine to better define previous surgery  We will also order MRI of the cervical spine with and without contrast and MRI of the thoracic spine without contrast and see the patient afterwards  I suspect there is some compression of the spinal cord and surgical intervention will likely be offered  Infectious or inflammatory process seems less likely based on history  I stressed the importance of following up on these studies  Given his physical examination and progressive decline in gait, he understands that without further investigation and intervention he could become quadriplegic or wheelchair dependent  History, physical examination and diagnostic tests were reviewed and questions answered  Diagnosis, care plan and treatment options were discussed  The patient, spouse/SO and family member patient and   Sister-in-law understand instructions and will follow up as directed      Plan:    Follow-up:  After tests complete    Problem List Items Addressed This Visit        Nervous and Auditory Other spondylosis with myelopathy, cervical region - Primary    Relevant Orders    XR spine cervical complete 6+ vw flex/ext/obl    MRI thoracic spine without contrast    MRI cervical spine with and without contrast       Other    Spinal stenosis    Gait disturbance    Relevant Orders    XR spine cervical complete 6+ vw flex/ext/obl    MRI thoracic spine without contrast    MRI cervical spine with and without contrast          Subjective/Objective     Chief Complaint     progressive gait difficulties  HPI      78-year-old gentleman who underwent anterior cervical surgery in 2007 for progressive gait difficulties  Afterwards his gait stabilized  Approximately 1 year ago he began to notice progressive difficulties with gait  He denies any pain or weakness in his legs but does describe some mild numbness and that his legs are quite stiff and he shuffles  He denies any pain, weakness or numbness in his arms  He denies any difficulties with bowel bladder function or changing perineal sensation  Both he and his family feel that he is cognitively doing quite well  He denies any difficulties with fine motor tasks or handwriting but does have difficulties with balance  He is right-hand dominant  He has been to physical therapy for balance training which was somewhat helpful  He presents today without any imaging for evaluation  ROS    Review of Systems   Constitutional: Negative for chills and fever  Eyes: Negative for pain and visual disturbance  Respiratory: Negative for cough, shortness of breath, wheezing and stridor  Cardiovascular: Negative for chest pain and palpitations  Musculoskeletal: Negative for arthralgias and back pain         Family History    Family History   Problem Relation Age of Onset    No Known Problems Mother     Stroke Family      ischemic       Social History    Social History     Social History    Marital status: /Civil Union     Spouse name: N/A    Number of children: N/A    Years of education: N/A     Occupational History    Not on file  Social History Main Topics    Smoking status: Current Every Day Smoker    Smokeless tobacco: Current User     Types: Chew    Alcohol use Not on file    Drug use: Unknown    Sexual activity: Not on file     Other Topics Concern    Not on file     Social History Narrative    No narrative on file       Past Medical History    Past Medical History:   Diagnosis Date    Gait disturbance 11/6/2013    Lipoma     last assessed: 1/23/2015       Surgical History    Past Surgical History:   Procedure Laterality Date    CERVICAL FUSION      vertebral       Medications      Current Outpatient Prescriptions:     amLODIPine-benazepril (LOTREL) 10-40 MG per capsule, Take by mouth, Disp: , Rfl:     aspirin 81 MG tablet, Take 1 tablet by mouth daily, Disp: , Rfl:     meloxicam (MOBIC) 7 5 mg tablet, Take 1 tablet (7 5 mg total) by mouth daily, Disp: 90 tablet, Rfl: 0    metoprolol tartrate (LOPRESSOR) 100 mg tablet, Take by mouth 2 (two) times a day, Disp: , Rfl:     Allergies    Allergies   Allergen Reactions    Aspirin     Ibuprofen      Reaction Date: [id number]; The following portions of the patient's history were reviewed and updated as appropriate: allergies, current medications, past family history, past medical history, past social history, past surgical history and problem list     Physical Exam    Vitals:  Blood pressure 147/77, pulse 55, resp  rate 16, height 6' 2" (1 88 m), weight 95 7 kg (211 lb)  ,Body mass index is 27 09 kg/m²  Physical Exam   Constitutional: He appears well-developed and well-nourished  Musculoskeletal:        Lumbar back: He exhibits decreased range of motion  Neurological: He has an abnormal Romberg Test    Reflex Scores:       Bicep reflexes are 1+ on the right side and 1+ on the left side  Patellar reflexes are 3+ on the right side and 3+ on the left side    Skin: Skin is warm and dry  Psychiatric: He has a normal mood and affect  His behavior is normal  Judgment and thought content normal    Vitals reviewed  Neurologic Exam     Motor Exam   Muscle bulk: normal  Right arm tone: normal  Left arm tone: normal  Right leg tone: spastic  Left leg tone: spastic  4/5 power in lower extremities  5/5 power in upper extremities  Sensory Exam     Diminished pinprick sensation from nipple level to lower extremities  Diminished vibration sensation diffusely in lower extremities  Diminished light touch sensation in lower extremities  Gait, Coordination, and Reflexes     Gait  Gait: shuffling and spastic    Coordination   Romberg: positive    Reflexes   Right biceps: 1+  Left biceps: 1+  Right patellar: 3+  Left patellar: 3+  Right plantar: upgoing  Left plantar: upgoing  Right Brown: absent  Left Brown: absent  Right ankle clonus: present  Left ankle clonus: present  No dysdiadochokinesia

## 2018-05-30 NOTE — PROGRESS NOTES
Office Note - Neurosurgery   Zachary Curran  74 y o  male MRN: [id number]      Assessment:    Patient is gradually worsening  64-year-old gentleman with progressive gait difficulty concerning for thoracic or lower cervical myelopathy  He is myelopathic and his lower extremities on physical examination and seems to have a midthoracic sensory level  Will obtain plain film of the cervical spine to better define previous surgery  We will also order MRI of the cervical spine with and without contrast and MRI of the thoracic spine without contrast and see the patient afterwards  I suspect there is some compression of the spinal cord and surgical intervention will likely be offered  Infectious or inflammatory process seems less likely based on history  I stressed the importance of following up on these studies  Given his physical examination and progressive decline in gait, he understands that without further investigation and intervention he could become quadriplegic or wheelchair dependent  History, physical examination and diagnostic tests were reviewed and questions answered  Diagnosis, care plan and treatment options were discussed  The patient, spouse/SO and family member patient and   Sister-in-law understand instructions and will follow up as directed  Plan:    Follow-up:  After tests complete    Problem List Items Addressed This Visit        Nervous and Auditory    Other spondylosis with myelopathy, cervical region - Primary    Relevant Orders    XR spine cervical complete 6+ vw flex/ext/obl    MRI thoracic spine without contrast    MRI cervical spine with and without contrast       Other    Spinal stenosis    Gait disturbance    Relevant Orders    XR spine cervical complete 6+ vw flex/ext/obl    MRI thoracic spine without contrast    MRI cervical spine with and without contrast          Subjective/Objective     Chief Complaint     progressive gait difficulties      HPI      64-year-old gentleman who underwent anterior cervical surgery in 2007 for progressive gait difficulties  Afterwards his gait stabilized  Approximately 1 year ago he began to notice progressive difficulties with gait  He denies any pain or weakness in his legs but does describe some mild numbness and that his legs are quite stiff and he shuffles  He denies any pain, weakness or numbness in his arms  He denies any difficulties with bowel bladder function or changing perineal sensation  Both he and his family feel that he is cognitively doing quite well  He denies any difficulties with fine motor tasks or handwriting but does have difficulties with balance  He is right-hand dominant  He has been to physical therapy for balance training which was somewhat helpful  He presents today without any imaging for evaluation  ROS    Review of Systems   Constitutional: Negative for chills and fever  Eyes: Negative for pain and visual disturbance  Respiratory: Negative for cough, shortness of breath, wheezing and stridor  Cardiovascular: Negative for chest pain and palpitations  Musculoskeletal: Negative for arthralgias and back pain  Family History    Family History   Problem Relation Age of Onset    No Known Problems Mother     Stroke Family      ischemic       Social History    Social History     Social History    Marital status: /Civil Union     Spouse name: N/A    Number of children: N/A    Years of education: N/A     Occupational History    Not on file       Social History Main Topics    Smoking status: Current Every Day Smoker    Smokeless tobacco: Current User     Types: Chew    Alcohol use Not on file    Drug use: Unknown    Sexual activity: Not on file     Other Topics Concern    Not on file     Social History Narrative    No narrative on file       Past Medical History    Past Medical History:   Diagnosis Date    Gait disturbance 11/6/2013    Lipoma     last assessed: 1/23/2015       Surgical History    Past Surgical History:   Procedure Laterality Date    CERVICAL FUSION      vertebral       Medications      Current Outpatient Prescriptions:     amLODIPine-benazepril (LOTREL) 10-40 MG per capsule, Take by mouth, Disp: , Rfl:     aspirin 81 MG tablet, Take 1 tablet by mouth daily, Disp: , Rfl:     meloxicam (MOBIC) 7 5 mg tablet, Take 1 tablet (7 5 mg total) by mouth daily, Disp: 90 tablet, Rfl: 0    metoprolol tartrate (LOPRESSOR) 100 mg tablet, Take by mouth 2 (two) times a day, Disp: , Rfl:     Allergies    Allergies   Allergen Reactions    Aspirin     Ibuprofen      Reaction Date: [id number]; The following portions of the patient's history were reviewed and updated as appropriate: allergies, current medications, past family history, past medical history, past social history, past surgical history and problem list     Physical Exam    Vitals:  Blood pressure 147/77, pulse 55, resp  rate 16, height 6' 2" (1 88 m), weight 95 7 kg (211 lb)  ,Body mass index is 27 09 kg/m²  Physical Exam   Constitutional: He appears well-developed and well-nourished  Musculoskeletal:        Lumbar back: He exhibits decreased range of motion  Neurological: He has an abnormal Romberg Test    Reflex Scores:       Bicep reflexes are 1+ on the right side and 1+ on the left side  Patellar reflexes are 3+ on the right side and 3+ on the left side  Skin: Skin is warm and dry  Psychiatric: He has a normal mood and affect  His behavior is normal  Judgment and thought content normal    Vitals reviewed  Neurologic Exam     Motor Exam   Muscle bulk: normal  Right arm tone: normal  Left arm tone: normal  Right leg tone: spastic  Left leg tone: spastic  4/5 power in lower extremities  5/5 power in upper extremities  Sensory Exam     Diminished pinprick sensation from nipple level to lower extremities  Diminished vibration sensation diffusely in lower extremities    Diminished light touch sensation in lower extremities  Gait, Coordination, and Reflexes     Gait  Gait: shuffling and spastic    Coordination   Romberg: positive    Reflexes   Right biceps: 1+  Left biceps: 1+  Right patellar: 3+  Left patellar: 3+  Right plantar: upgoing  Left plantar: upgoing  Right Brown: absent  Left Brown: absent  Right ankle clonus: present  Left ankle clonus: present  No dysdiadochokinesia

## 2018-05-31 ENCOUNTER — TELEPHONE (OUTPATIENT)
Dept: NEUROSURGERY | Facility: CLINIC | Age: 73
End: 2018-05-31

## 2018-05-31 DIAGNOSIS — M47.12 OTHER SPONDYLOSIS WITH MYELOPATHY, CERVICAL REGION: Primary | ICD-10-CM

## 2018-05-31 NOTE — TELEPHONE ENCOUNTER
Per request from [facility] radiology, script for BUN/CREAT placed

## 2018-06-07 DIAGNOSIS — M19.90 CHRONIC ARTHRITIS: ICD-10-CM

## 2018-06-07 RX ORDER — MELOXICAM 7.5 MG/1
TABLET ORAL
Qty: 90 TABLET | Refills: 0 | Status: SHIPPED | OUTPATIENT
Start: 2018-06-07 | End: 2018-07-18 | Stop reason: SDUPTHER

## 2018-06-08 ENCOUNTER — TRANSCRIBE ORDERS (OUTPATIENT)
Dept: LAB | Facility: CLINIC | Age: 73
End: 2018-06-08

## 2018-06-08 ENCOUNTER — APPOINTMENT (OUTPATIENT)
Dept: LAB | Facility: CLINIC | Age: 73
End: 2018-06-08
Payer: MEDICARE

## 2018-06-08 DIAGNOSIS — Q39.2 VERTEBRAL ABNORMALITIES, ANAL ATRESIA, CARDIAC ABNORMALITIES, TRACHEO-ESOPHAGEAL FISTULA, AND LIMB DEFECTS (VACTEL) SYNDROME: Primary | ICD-10-CM

## 2018-06-08 DIAGNOSIS — Q28.8 VERTEBRAL ABNORMALITIES, ANAL ATRESIA, CARDIAC ABNORMALITIES, TRACHEO-ESOPHAGEAL FISTULA, AND LIMB DEFECTS (VACTEL) SYNDROME: Primary | ICD-10-CM

## 2018-06-08 DIAGNOSIS — Q74.9 VERTEBRAL ABNORMALITIES, ANAL ATRESIA, CARDIAC ABNORMALITIES, TRACHEO-ESOPHAGEAL FISTULA, AND LIMB DEFECTS (VACTEL) SYNDROME: Primary | ICD-10-CM

## 2018-06-08 DIAGNOSIS — Q87.89 VERTEBRAL ABNORMALITIES, ANAL ATRESIA, CARDIAC ABNORMALITIES, TRACHEO-ESOPHAGEAL FISTULA, AND LIMB DEFECTS (VACTEL) SYNDROME: Primary | ICD-10-CM

## 2018-06-08 DIAGNOSIS — Q76.49 VERTEBRAL ABNORMALITIES, ANAL ATRESIA, CARDIAC ABNORMALITIES, TRACHEO-ESOPHAGEAL FISTULA, AND LIMB DEFECTS (VACTEL) SYNDROME: Primary | ICD-10-CM

## 2018-06-08 DIAGNOSIS — M47.12 OTHER SPONDYLOSIS WITH MYELOPATHY, CERVICAL REGION: ICD-10-CM

## 2018-06-08 DIAGNOSIS — Q42.3 VERTEBRAL ABNORMALITIES, ANAL ATRESIA, CARDIAC ABNORMALITIES, TRACHEO-ESOPHAGEAL FISTULA, AND LIMB DEFECTS (VACTEL) SYNDROME: Primary | ICD-10-CM

## 2018-06-08 LAB
BUN SERPL-MCNC: 18 MG/DL (ref 5–25)
CREAT SERPL-MCNC: 1.08 MG/DL (ref 0.6–1.3)
GFR SERPL CREATININE-BSD FRML MDRD: 67 ML/MIN/1.73SQ M

## 2018-06-08 PROCEDURE — 82565 ASSAY OF CREATININE: CPT

## 2018-06-08 PROCEDURE — 36415 COLL VENOUS BLD VENIPUNCTURE: CPT

## 2018-06-08 PROCEDURE — 84520 ASSAY OF UREA NITROGEN: CPT

## 2018-06-14 ENCOUNTER — HOSPITAL ENCOUNTER (OUTPATIENT)
Dept: MRI IMAGING | Facility: HOSPITAL | Age: 73
Discharge: HOME | End: 2018-06-14
Attending: NEUROLOGICAL SURGERY
Payer: MEDICARE

## 2018-06-14 DIAGNOSIS — R26.9 GAIT DISTURBANCE: ICD-10-CM

## 2018-06-14 DIAGNOSIS — M47.12 OTHER SPONDYLOSIS WITH MYELOPATHY, CERVICAL REGION: ICD-10-CM

## 2018-06-14 PROCEDURE — 72156 MRI NECK SPINE W/O & W/DYE: CPT

## 2018-06-14 PROCEDURE — 72146 MRI CHEST SPINE W/O DYE: CPT

## 2018-06-14 PROCEDURE — A9585 GADOBUTROL INJECTION: HCPCS | Performed by: NEUROLOGICAL SURGERY

## 2018-06-14 RX ADMIN — GADOBUTROL 9 ML: 604.72 INJECTION INTRAVENOUS at 19:15

## 2018-06-21 ENCOUNTER — OFFICE VISIT (OUTPATIENT)
Dept: NEUROSURGERY | Facility: CLINIC | Age: 73
End: 2018-06-21
Payer: MEDICARE

## 2018-06-21 VITALS
RESPIRATION RATE: 16 BRPM | HEIGHT: 74 IN | DIASTOLIC BLOOD PRESSURE: 78 MMHG | BODY MASS INDEX: 26.51 KG/M2 | SYSTOLIC BLOOD PRESSURE: 158 MMHG | WEIGHT: 206.6 LBS | HEART RATE: 72 BPM | TEMPERATURE: 97.7 F

## 2018-06-21 DIAGNOSIS — R26.9 GAIT DISTURBANCE: Primary | ICD-10-CM

## 2018-06-21 PROCEDURE — 99214 OFFICE O/P EST MOD 30 MIN: CPT | Performed by: NEUROLOGICAL SURGERY

## 2018-06-21 NOTE — PROGRESS NOTES
Office Note - Neurosurgery   Antonino Mena  74 y o  male MRN: [id number]      Assessment:    Patient is stable  47-year-old gentleman with gait difficulty and upper motor neuron findings on lower extremity examination  However, recent MRI of the cervical and thoracic spine do not any significant compression on the spinal cord  While he may have lumbar spinal stenosis, I would not expect this presentation for lower motor neuron gait dysfunction  His gait and presentation do not seem to be typical for normal pressure hydrocephalus  I will refer him to Neurology for further assessment  I wonder if he may have neuromuscular disorder contributing to some of his symptoms though it would be somewhat unusual for to be restricted to the lower extremities  I spoke personally with his PCP and there does not seem to be high risk factors for infectious etiology  Alternatively, this may represent progression of previous spinal cord injury/myelopathy with aging  I will obtain an MRI of the brain without contrast to rule out a parasagittal lesion that on occasion can present with lower extremity dysfunction  I will follow up afterwards with the patient to discuss the results  History, physical examination and diagnostic tests were reviewed and questions answered  Diagnosis, care plan and treatment options were discussed  The patient and spouse/SO understand instructions and will follow up as directed  Plan:    Follow-up:  After tests complete    Problem List Items Addressed This Visit        Other    Gait disturbance - Primary    Relevant Orders    MRI brain without contrast    Ambulatory referral to Neurology          Subjective/Objective     Chief Complaint    Gait difficulty  HPI    Patient presents for follow-up of MRI of the cervical and thoracic spine    His gait difficulties which seem to have worsened over the past year so but been present since previous anterior cervical decompression and fusion have not changed significantly  He continues to deny any pain or weakness in his legs  ROS    Review of Systems   Constitutional: Negative  HENT: Negative  Eyes: Negative  Respiratory: Negative  Cardiovascular: Negative  Gastrointestinal: Negative  Endocrine: Negative  Genitourinary: Negative  Musculoskeletal: Positive for gait problem  Skin: Negative  Allergic/Immunologic: Negative  Neurological: Negative for dizziness, seizures, syncope, weakness, numbness and headaches  Balance off     Hematological: Bruises/bleeds easily  Psychiatric/Behavioral: Negative  Family History    Family History   Problem Relation Age of Onset    No Known Problems Mother     Stroke Family         ischemic       Social History    Social History     Social History    Marital status: /Civil Union     Spouse name: N/A    Number of children: N/A    Years of education: N/A     Occupational History    Not on file       Social History Main Topics    Smoking status: Current Every Day Smoker    Smokeless tobacco: Current User     Types: Chew    Alcohol use Not on file    Drug use: Unknown    Sexual activity: Not on file     Other Topics Concern    Not on file     Social History Narrative    No narrative on file       Past Medical History    Past Medical History:   Diagnosis Date    Gait disturbance 11/6/2013    Lipoma     last assessed: 1/23/2015       Surgical History    Past Surgical History:   Procedure Laterality Date    CERVICAL FUSION      vertebral       Medications      Current Outpatient Prescriptions:     amLODIPine-benazepril (LOTREL) 10-40 MG per capsule, Take by mouth, Disp: , Rfl:     aspirin 81 MG tablet, Take 1 tablet by mouth daily, Disp: , Rfl:     meloxicam (MOBIC) 7 5 mg tablet, TAKE 1 TABLET BY MOUTH EVERY DAY, Disp: 90 tablet, Rfl: 0    metoprolol tartrate (LOPRESSOR) 100 mg tablet, Take by mouth 2 (two) times a day, Disp: , Rfl: Allergies    Allergies   Allergen Reactions    Aspirin     Ibuprofen      Reaction Date: [id number]; The following portions of the patient's history were reviewed and updated as appropriate: allergies, current medications, past family history, past medical history, past social history, past surgical history and problem list     Investigations    I personally reviewed the MRI and XRAY results with the patient:    MRI of the cervical and thoracic spine without contrast dated June 14th, 2018  Normal cervical and thoracic alignment  Mild degenerative changes  Previous ACDF within the cervical spine  No significant cervical or thoracic central canal stenosis or compression on the spinal cord  No spinal cord signal change  Foraminal stenosis at multiple levels within the cervical spine  Spinal cord is normal in signal   Visualized posterior fossa structures are unremarkable  Note is made of a sebaceous cyst over the sternum on MRI of the thoracic spine  The patient has been aware of this lesion for many years and is not changed significantly  Plain film of the cervical spine dated March 30th, 2018  Previous anterior cervical decompression and fusion  No gross instability on flexion-extension  Good bony fusion across C5-6  Physical Exam    Vitals:  Blood pressure 158/78, pulse 72, temperature 97 7 °F (36 5 °C), resp  rate 16, height 6' 2" (1 88 m), weight 93 7 kg (206 lb 9 6 oz)  ,Body mass index is 26 53 kg/m²  Physical Exam  Neurologic Exam     Motor Exam   Right leg tone: increased  Left leg tone: increased5/5 power in upper extremities  4/5 power in lower extremities       Gait, Coordination, and Reflexes     Gait  Gait: spastic

## 2018-07-12 ENCOUNTER — HOSPITAL ENCOUNTER (OUTPATIENT)
Dept: MRI IMAGING | Facility: HOSPITAL | Age: 73
Discharge: HOME | End: 2018-07-12
Attending: NEUROLOGICAL SURGERY
Payer: MEDICARE

## 2018-07-12 DIAGNOSIS — R26.9 GAIT DISTURBANCE: ICD-10-CM

## 2018-07-12 PROCEDURE — 70551 MRI BRAIN STEM W/O DYE: CPT

## 2018-07-18 ENCOUNTER — OFFICE VISIT (OUTPATIENT)
Dept: FAMILY MEDICINE CLINIC | Facility: CLINIC | Age: 73
End: 2018-07-18
Payer: MEDICARE

## 2018-07-18 VITALS
BODY MASS INDEX: 26.31 KG/M2 | TEMPERATURE: 97.2 F | HEART RATE: 52 BPM | DIASTOLIC BLOOD PRESSURE: 76 MMHG | SYSTOLIC BLOOD PRESSURE: 136 MMHG | WEIGHT: 205 LBS | RESPIRATION RATE: 16 BRPM | OXYGEN SATURATION: 95 % | HEIGHT: 74 IN

## 2018-07-18 DIAGNOSIS — E78.00 HYPERCHOLESTEROLEMIA: ICD-10-CM

## 2018-07-18 DIAGNOSIS — M19.90 CHRONIC ARTHRITIS: ICD-10-CM

## 2018-07-18 DIAGNOSIS — Z12.5 PROSTATE CANCER SCREENING: ICD-10-CM

## 2018-07-18 DIAGNOSIS — L08.9 INFECTED SEBACEOUS CYST: ICD-10-CM

## 2018-07-18 DIAGNOSIS — G62.9 PERIPHERAL POLYNEUROPATHY: ICD-10-CM

## 2018-07-18 DIAGNOSIS — R26.9 GAIT DISTURBANCE: ICD-10-CM

## 2018-07-18 DIAGNOSIS — Z23 NEED FOR SHINGLES VACCINE: ICD-10-CM

## 2018-07-18 DIAGNOSIS — L72.3 INFECTED SEBACEOUS CYST: ICD-10-CM

## 2018-07-18 DIAGNOSIS — Z00.00 MEDICARE ANNUAL WELLNESS VISIT, SUBSEQUENT: Primary | ICD-10-CM

## 2018-07-18 DIAGNOSIS — Z13.89 PROTEIN SCREENING: ICD-10-CM

## 2018-07-18 DIAGNOSIS — R73.09 ABNORMAL GLUCOSE: ICD-10-CM

## 2018-07-18 DIAGNOSIS — I10 BENIGN ESSENTIAL HYPERTENSION: ICD-10-CM

## 2018-07-18 DIAGNOSIS — M47.12 OTHER SPONDYLOSIS WITH MYELOPATHY, CERVICAL REGION: ICD-10-CM

## 2018-07-18 PROCEDURE — G0439 PPPS, SUBSEQ VISIT: HCPCS | Performed by: FAMILY MEDICINE

## 2018-07-18 RX ORDER — MELOXICAM 7.5 MG/1
7.5 TABLET ORAL DAILY
Qty: 90 TABLET | Refills: 3 | Status: SHIPPED | OUTPATIENT
Start: 2018-07-18 | End: 2019-01-23 | Stop reason: SDUPTHER

## 2018-07-18 NOTE — ASSESSMENT & PLAN NOTE
Appreciated consultation with Neurosurgery  Patient will be seeing Neurology tomorrow as well in consultation    No definitive etiology noted

## 2018-07-18 NOTE — PROGRESS NOTES
Assessment and Plan:    Problem List Items Addressed This Visit     None        Health Maintenance Due   Topic Date Due    Depression Screening PHQ-9  1945    CRC Screening: Colonoscopy  1945    DTaP,Tdap,and Td Vaccines (1 - Tdap) 11/04/1964    Fall Risk  11/04/2008    ABDOMINAL AORTIC ANEURYSM (AAA) SCREEN  11/04/2008    GLAUCOMA SCREENING 65 + YR  11/04/2010         HPI:  Dee Fish  is a 74 y o  male here for his Subsequent Wellness Visit  Patient Active Problem List   Diagnosis    Spinal stenosis    Peripheral neuropathy    Gait disturbance    Other spondylosis with myelopathy, cervical region     Past Medical History:   Diagnosis Date    Gait disturbance 11/6/2013    Lipoma     last assessed: 1/23/2015     Past Surgical History:   Procedure Laterality Date    CERVICAL FUSION      vertebral     Family History   Problem Relation Age of Onset    No Known Problems Mother     Stroke Family         ischemic     History   Smoking Status    Current Every Day Smoker   Smokeless Tobacco    Current User    Types: Chew     History   Alcohol use Not on file      History   Drug use: Unknown       Current Outpatient Prescriptions   Medication Sig Dispense Refill    amLODIPine-benazepril (LOTREL) 10-40 MG per capsule Take by mouth      aspirin 81 MG tablet Take 1 tablet by mouth daily      meloxicam (MOBIC) 7 5 mg tablet TAKE 1 TABLET BY MOUTH EVERY DAY 90 tablet 0    metoprolol tartrate (LOPRESSOR) 100 mg tablet Take by mouth 2 (two) times a day       No current facility-administered medications for this visit        Allergies   Allergen Reactions    Aspirin     Ibuprofen      Reaction Date: [id number];      Immunization History   Administered Date(s) Administered    Influenza Split High Dose Preservative Free IM 11/06/2013, 10/14/2014, 11/11/2015, 11/22/2016, 12/07/2017    Influenza TIV (IM) 09/30/2010, 09/07/2011    Pneumococcal Conjugate 13-Valent 05/18/2016    Pneumococcal Polysaccharide PPV23 04/29/2010       Patient Care Team:  Joseline Washburn DO as PCP - Zee Galarza MD      Medicare Screening Tests and Risk Assessments:  AWV Clinical     ISAR:       Once in a Lifetime Medicare Screening:       Medicare Screening Tests and Risk Assessment:   AAA Risk Assessment    Osteoporosis Risk Assessment    HIV Risk Assessment        Drug and Alcohol Use:   Tobacco use    Tobacco use duration    Tobacco Cessation Readiness    Alcohol use    Alcohol Treatment Readiness   Illicit Drug Use        Diet & Exercise:   Diet   How many servings a day of the following:   Exercise        Cognitive Impairment Screening:   Cognitive Impairment Screening        Functional Ability/Level of Safety:   Hearing    Hearing Impairment Assessment    Current Activities    Help needed with the folllowing:    ADL    Fall Risk   Injury History       Home Safety:   Home Safety Risk Factors       Advanced Directives:   Advanced Directives    Patient's End of Life Decisions        Urinary Incontinence:       Glaucoma:            Provider Screening    No data filed

## 2018-07-18 NOTE — ASSESSMENT & PLAN NOTE
Well controlled on Lopressor 100 mg twice daily and Lotrel 10/40 once daily    Refills not necessary at this time

## 2018-07-18 NOTE — PROGRESS NOTES
Subjective:      Patient ID: Zachary Curran  is a 74 y o  male  Patient presents for subsequent annual wellness visit as well as follow-up of chronic conditions including hypertension, gait disturbance  Patient did see neurosurgeon in regards to his gait disturbance  He did have an MRI of the cervical thoracic spine  No definitive impingement noted  MRI of the brain was completely unremarkable  Patient will be seeing neurologist in consultation at the request of Neurosurgery  Patient is always had difficulty with his gait ever since sustaining a neck injury which required cervical fusion some years ago  Patient denies pain  Simply has weakness when walking  Past Medical History:   Diagnosis Date    Gait disturbance 11/6/2013    Lipoma     last assessed: 1/23/2015       Family History   Problem Relation Age of Onset    No Known Problems Mother     Stroke Family         ischemic       Past Surgical History:   Procedure Laterality Date    CERVICAL FUSION      vertebral        reports that he has been smoking  His smokeless tobacco use includes Chew  Current Outpatient Prescriptions:     amLODIPine-benazepril (LOTREL) 10-40 MG per capsule, Take by mouth, Disp: , Rfl:     aspirin 81 MG tablet, Take 1 tablet by mouth daily, Disp: , Rfl:     meloxicam (MOBIC) 7 5 mg tablet, TAKE 1 TABLET BY MOUTH EVERY DAY, Disp: 90 tablet, Rfl: 0    metoprolol tartrate (LOPRESSOR) 100 mg tablet, Take by mouth 2 (two) times a day, Disp: , Rfl:     The following portions of the patient's history were reviewed and updated as appropriate: allergies, current medications, past family history, past medical history, past social history, past surgical history and problem list     Review of Systems   HENT: Negative  Eyes: Negative  Respiratory: Negative  Cardiovascular: Negative  Gastrointestinal: Negative  Endocrine: Negative  Genitourinary: Negative      Musculoskeletal: Positive for gait problem  Negative for arthralgias, back pain, joint swelling and myalgias  Skin: Negative  Allergic/Immunologic: Negative  Neurological: Positive for weakness  Negative for tremors  Hematological: Negative  Psychiatric/Behavioral: Negative  All other systems reviewed and are negative  Objective:    /76   Pulse (!) 52   Temp (!) 97 2 °F (36 2 °C) (Tympanic)   Resp 16   Ht 6' 2" (1 88 m)   Wt 93 kg (205 lb)   SpO2 95%   BMI 26 32 kg/m²      Physical Exam   Constitutional: He is oriented to person, place, and time  He appears well-developed and well-nourished  No distress  HENT:   Head: Normocephalic  Eyes: Conjunctivae and EOM are normal  Pupils are equal, round, and reactive to light  Neck: Normal range of motion  Neck supple  Cardiovascular: Normal rate, regular rhythm and normal heart sounds  Pulmonary/Chest: Effort normal and breath sounds normal    Abdominal: Soft  Bowel sounds are normal    Musculoskeletal: Normal range of motion  Neurological: He is alert and oriented to person, place, and time  He displays abnormal reflex  He exhibits abnormal muscle tone  Gait abnormal    Reflex Scores:       Patellar reflexes are 1+ on the right side  Achilles reflexes are 1+ on the right side and 1+ on the left side  Skin:        1 cm  sebaceous cyst at the right upper flank   Psychiatric: He has a normal mood and affect  His behavior is normal  Judgment and thought content normal    Nursing note and vitals reviewed          Recent Results (from the past 1008 hour(s))   BUN    Collection Time: 06/08/18  8:45 AM   Result Value Ref Range    BUN 18 5 - 25 mg/dL   Creatinine, serum    Collection Time: 06/08/18  8:45 AM   Result Value Ref Range    Creatinine 1 08 0 60 - 1 30 mg/dL    eGFR 67 ml/min/1 73sq m     Incision and Drainage  Date/Time: 7/18/2018 2:10 PM  Performed by: Fany Hankins by: Lucy Smith     Patient location:  Clinic  Consent:     Consent obtained:  Verbal    Consent given by:  Patient and spouse    Risks discussed:  Bleeding, incomplete drainage, pain and infection    Alternatives discussed:  No treatment  Location:     Type:  Cyst    Size:  2cm    Location:  Trunk    Trunk location:  Back  Pre-procedure details:     Skin preparation:  Betadine  Anesthesia (see MAR for exact dosages): Anesthesia method:  None  Procedure details:     Complexity:  Simple    Needle aspiration: yes      Needle size:  18 G    Approach:  Puncture    Incision depth:  Subcutaneous    Wound management:  Probed and deloculated    Drainage characteristics: Sebaceous  Foul-smelling  Drainage amount:  Scant    Wound treatment:  Wound left open    Packing materials:  None  Post-procedure details:     Patient tolerance of procedure: Tolerated well, no immediate complications      Assessment/Plan:    No problem-specific Assessment & Plan notes found for this encounter  Problem List Items Addressed This Visit     Peripheral neuropathy    Relevant Orders    Comprehensive metabolic panel    Gait disturbance    Relevant Orders    TSH, 3rd generation with Free T4 reflex    Other spondylosis with myelopathy, cervical region      Appreciated consultation with Neurosurgery  Patient will be seeing Neurology tomorrow as well in consultation  No definitive etiology noted         Prostate cancer screening      Screening PSA ordered         Medicare annual wellness visit, subsequent - Primary    Abnormal glucose    Benign essential hypertension       Well controlled on Lopressor 100 mg twice daily and Lotrel 10/40 once daily  Refills not necessary at this time         Relevant Orders    CBC and differential    TSH, 3rd generation with Free T4 reflex    Infected sebaceous cyst      Needle incision performed and sebaceous material expressed  Patient tolerated this well    If this does become larger area will need to be incised and drained further         Relevant Orders Incision and Drainage    Hypercholesterolemia      Not presently on medications    Check lipid profile         Relevant Orders    Lipid panel    Need for shingles vaccine      Order provided for shingrix         Relevant Medications    Zoster Vac Recomb Adjuvanted ([address]) 50 MCG SUSR      Other Visit Diagnoses     Chronic arthritis        Relevant Medications    meloxicam (MOBIC) 7 5 mg tablet    Protein screening        Relevant Orders    PSA, total and free

## 2018-07-18 NOTE — ASSESSMENT & PLAN NOTE
Needle incision performed and sebaceous material expressed  Patient tolerated this well    If this does become larger area will need to be incised and drained further

## 2018-07-19 ENCOUNTER — OFFICE VISIT (OUTPATIENT)
Dept: NEUROLOGY | Facility: CLINIC | Age: 73
End: 2018-07-19
Payer: MEDICARE

## 2018-07-19 VITALS
SYSTOLIC BLOOD PRESSURE: 142 MMHG | WEIGHT: 205.03 LBS | BODY MASS INDEX: 26.32 KG/M2 | DIASTOLIC BLOOD PRESSURE: 78 MMHG | HEART RATE: 70 BPM

## 2018-07-19 DIAGNOSIS — R26.9 GAIT DISTURBANCE: Primary | ICD-10-CM

## 2018-07-19 PROCEDURE — 99203 OFFICE O/P NEW LOW 30 MIN: CPT | Performed by: PSYCHIATRY & NEUROLOGY

## 2018-07-19 NOTE — PROGRESS NOTES
Patient ID: Ned Cousin  is a 74 y o  male  hereditary spastic paraparesis    The patient presents with a duration of symptoms and progression very consistent with predominantly in upper motor neuron selective dysfunction of her Ed Jessenia spastic paraparesis  This disorder does not have to be distinctly hereditary and multiple sporadic cases occur      I am going to obtain electrodiagnostic studies to determine if there is any lower motor neuron dysfunction and also asked my colleague in neuromuscular Disease to evaluate the patient  Subjective: The patient has a progressive dysfunction of gait with spasticity  He noticed it approximately 10-11 years ago with difficulty walking with stiffness  He noticed no problems with his arms  He has cervical degenerative disease with prior cervical spine surgery  Recently an MRI scan of the cervical and thoracic spine failed to reveal any spinal cord changes and the patient was referred because of the duration and progression of his symptoms  He still can ambulate although uses a cane has had no bladder bowel dysfunction and absolutely no one in the family with similar dysfunction  There is no in the family uses a cane or a wheelchair there has been no spinal or back trauma no involvement of the hands or arms or trunk has been noted progression has been predominantly painless      HPI       Objective: There were no vitals taken for this visit  Physical Exam    Neurological Exam notable for distinct spasticity in the lower extremities with mild weakness and notable hyperreflexia and bilateral extensor responses no sensory level was noted and no sensory loss was appreciated      ROS:    Review of Systems   HENT: Negative  Eyes: Negative  Respiratory: Negative  Cardiovascular: Negative  Gastrointestinal: Negative  Endocrine: Negative  Musculoskeletal: Positive for gait problem  Skin: Negative  Allergic/Immunologic: Negative  Neurological: Positive for weakness and numbness (Cant feel forearm )  Hematological: Negative  Psychiatric/Behavioral: Negative

## 2018-08-20 ENCOUNTER — HOSPITAL ENCOUNTER (OUTPATIENT)
Dept: NEUROLOGY | Facility: CLINIC | Age: 73
Discharge: HOME | End: 2018-08-20
Payer: MEDICARE

## 2018-08-20 DIAGNOSIS — R26.9 GAIT DISTURBANCE: ICD-10-CM

## 2018-08-20 PROCEDURE — 95887 MUSC TST DONE W/N TST NONEXT: CPT

## 2018-08-20 PROCEDURE — 95886 MUSC TEST DONE W/N TEST COMP: CPT | Performed by: PSYCHIATRY & NEUROLOGY

## 2018-08-20 PROCEDURE — 95912 NRV CNDJ TEST 11-12 STUDIES: CPT

## 2018-08-20 PROCEDURE — 95912 NRV CNDJ TEST 11-12 STUDIES: CPT | Performed by: PSYCHIATRY & NEUROLOGY

## 2018-08-20 PROCEDURE — 95886 MUSC TEST DONE W/N TEST COMP: CPT

## 2018-08-20 PROCEDURE — 95887 MUSC TST DONE W/N TST NONEXT: CPT | Performed by: PSYCHIATRY & NEUROLOGY

## 2018-09-05 ENCOUNTER — TELEPHONE (OUTPATIENT)
Dept: NEUROLOGY | Facility: CLINIC | Age: 73
End: 2018-09-05

## 2018-09-05 NOTE — TELEPHONE ENCOUNTER
Discussed with Dr Maxwell David  ?HSP  Called patient's wife to discuss and set up f/u with Dr Moses Sanchez per her  Patient's wife agreeable  Fwd'ed to VITOR Cooper to schedule

## 2018-09-27 ENCOUNTER — OFFICE VISIT (OUTPATIENT)
Dept: NEUROLOGY | Facility: CLINIC | Age: 73
End: 2018-09-27
Payer: MEDICARE

## 2018-09-27 VITALS
SYSTOLIC BLOOD PRESSURE: 158 MMHG | BODY MASS INDEX: 26.31 KG/M2 | DIASTOLIC BLOOD PRESSURE: 78 MMHG | WEIGHT: 205 LBS | HEIGHT: 74 IN | HEART RATE: 79 BPM

## 2018-09-27 DIAGNOSIS — G11.4 HEREDITARY SPASTIC PARAPLEGIA (HCC): Primary | ICD-10-CM

## 2018-09-27 PROCEDURE — 99215 OFFICE O/P EST HI 40 MIN: CPT | Performed by: PSYCHIATRY & NEUROLOGY

## 2018-09-27 RX ORDER — BACLOFEN 10 MG/1
10 TABLET ORAL 3 TIMES DAILY
Qty: 90 TABLET | Refills: 3 | Status: SHIPPED | OUTPATIENT
Start: 2018-09-27 | End: 2019-01-23

## 2018-09-27 NOTE — LETTER
September 27, 2018     Sameerlindsay Liriano DO  [address]  [facility] [place] [address]    Patient: Christine Carver YOB: 1945   Date of Visit: 9/27/2018       Dear Dr Zari Garcia:    Thank you for referring Bulmaro Landers to me for evaluation  Below are my notes for this consultation  If you have questions, please do not hesitate to call me  I look forward to following your patient along with you  Sincerely,        Parish Tate MD        CC: MD Shine Ramirez MD Mose Cruise, MD  9/27/2018  9:44 PM  Sign at close encounter  Patient ID: Christine Carver  is a 74 y o  male  Assessment/Plan:    Hereditary spastic paraplegia (HCC)  I believe this patient has spastic paraplegia, this would be supported by his spasticity in the lower extremities, minimal sensory loss in the feet, essentially normal  imaging of the brain as well as his spine, and normal electrodiagnostic studies with poor activation suggesting a predominantly upper motor neuron process  His examination in the upper extremities is completely normal except for slightly brisk reflexes which can be seen in uncomplicated spastic paraplegia, and he does have some bladder and bowel urgency as well, consistent with the diagnosis  I had a lengthy discussion today with the patient and his family explaining the diagnosis, the workup which essentially involves ruling out any other structural abnormality, and further treatment process, which compromised most of my today's 45 minutes visit  I explained to them that unfortunately, there is no cure for this condition, and treatment is mainly supportive care  We discussed anti spasticity medications such as baclofen that we can use to improve some of the spasticity  I provided him with a prescription for 10 mg 3 times a day, he will start with 5 mg to try 3 times a day and gradually increase the dose as he tolerates it    We also discussed the option of baclofen pump, which he will think about and would contact me if he thinks that is an option for him  I stressed the importance of stretching on a daily basis, and also provided them with a prescription for aquatherapy  Besides this, we also discussed the option of power mobility device such as a power scooter in the long run for him  In general, he walks with a cane, but does have a walker for any longer distances,  However it is getting harder for him to walk considerable distances without getting tired  We talked about the possibility of a transport chair from now, and we can consider a power scooter in the future  Patient and his wife have my contact information for any questions or concerns, other than that I will see them back in 3 months for return visit  Thank you very much for allowing me to participate in his care  Diagnoses and all orders for this visit:    Hereditary spastic paraplegia (HCC)  -     baclofen 10 mg tablet; Take 1 tablet (10 mg total) by mouth 3 (three) times a day for 30 days           Subjective:    HPI    I had the pleasure of seeing your patient in [facility] today  As you know, he is a 24-year-old man who was referred for evaluation for spasticity in the lower extremities  Please allow me to summarize history for the record  He was seen by Dr Elizabeth Corrales in July 2018  As you know, he has history of cervical spine surgery in 2007 for progressive ambulatory difficulties  After the surgery his gait stabilize up until about a year ago, when he started noticing difficulty walking  He describes his legs to be very stiff and he tends to shuffle  He does have mild numbness in the feet but denies any significant lower back pain, or weakness in his lower extremities  He denies any difficulties with the bladder bowel, denies change in his perineal sensation  There has not been any difficulty with his hands, however balance has been significantly affected      Thinking back, he has had symptoms since his spine surgery in 2007, symptoms started a year prior to the surgery  Can go up a flight of stairs, uses the handrail  Coming down is ok  No change in weight  No fasciculations  Have some urinary urgency, and some urgency with bowels as well  No muscle cramps or spasms  As a part of his workup, he had MRI of the thoracic spine which showed mild degenerative disease without any significant spinal canal stenosis or cord compression  MRI of the cervical spine showed multilevel spondylosis as well without any cord compression or abnormal enhancement  He does have moderate bilateral neuroforaminal narrowing at C3-4 and C4-5 levels  MRI of the brain was essentially normal except for mild age-appropriate atrophy  He had an EMG with me on August 20th, which was essentially a normal study for his age ,however poor activation was noted in multiple tested muscles suggesting a predominantly upper motor neuron process  The following portions of the patient's history were reviewed and updated as appropriate: allergies, current medications, past family history, past medical history, past social history, past surgical history and problem list     No family hx of similar issues in any of the family member s No one in family was wheelchair /walker to ambulate  He is using a cane to ambulate  Objective:    Blood pressure 158/78, pulse 79, height 6' 2" (1 88 m), weight 93 kg (205 lb)  Physical Exam  General exam: Pt was awake, alert and oriented  HEENT: atraumatic, normocephalic  Normal oral mucosa, neck was supple, no lymphadenopathy  Normal peripheral pulses  Extremities did not show any edema or cyanosis  Neurological Exam  Neurologically, pt was awake and alert  Speech was normal, no dysarthria or aphasia  Cranial nerve exam showed normal extraocular movements, no nystagmus or diplopia  There was no ptosis at baseline or with sustained upward gaze     Strength of eye closure muscles was normal   Facial sensations were normal bilaterally  No facial weakness, able to blow out the cheeks and push the tongue in the cheeks well  No tongue atrophy or fasciculations  Motor exam revealed normal tone in the upper extremities, increased tone in both lower extremities, with tightening at the Achilles tendons  Muscle bulk was normal throughout in both upper and lower extremities  Muscle strength was normal in neck flexors and extensors and all muscle groups in both upper extremities  Muscle strength was full in all muscle groups in both lower extremities as well, except right hip flexors which were graded as 4+/5, however all the movements in both lower extremities were performed in a very slow and poorly coordinated fashion  Reflexes were graded as 3 in the upper extremities, 3+ at both knees and ankles with sustained clonus at both ankles  There was no exaggerated jaw jerk or carmona's sign  Sensory exam revealed minimal length dependent, decreased sensation to pin and temp up to ankles  Vibration was mildly reduced at toes  Proprioception was intact  He walks with the help of a cane, and has a spastic gait  ROS:  I reviewed the below ROS and what is mentioned in HPI, the remainder of ROS was negative  Review of Systems   Constitutional: Negative  Negative for appetite change and fever  HENT: Negative  Negative for hearing loss, tinnitus, trouble swallowing and voice change  Eyes: Negative  Negative for photophobia and pain  Respiratory: Negative  Negative for shortness of breath  Cardiovascular: Negative  Negative for palpitations  Gastrointestinal: Negative  Negative for nausea  Endocrine: Negative  Negative for cold intolerance and heat intolerance  Genitourinary: Negative  Negative for dysuria, frequency and urgency  Musculoskeletal: Positive for gait problem (balance problems, difficulty walking)   Negative for myalgias and neck pain    Skin: Negative  Allergic/Immunologic: Negative  Neurological: Negative for dizziness, tremors, seizures, syncope, facial asymmetry, speech difficulty, weakness and numbness  Hematological: Negative  Does not bruise/bleed easily  Psychiatric/Behavioral: Negative for confusion and hallucinations

## 2018-09-27 NOTE — PROGRESS NOTES
Patient ID: Kt Adams  is a 74 y o  male  Assessment/Plan:    Hereditary spastic paraplegia (HCC)  I believe this patient has spastic paraplegia, this would be supported by his spasticity in the lower extremities, minimal sensory loss in the feet, essentially normal  imaging of the brain as well as his spine, and normal electrodiagnostic studies with poor activation suggesting a predominantly upper motor neuron process  His examination in the upper extremities is completely normal except for slightly brisk reflexes which can be seen in uncomplicated spastic paraplegia, and he does have some bladder and bowel urgency as well, consistent with the diagnosis  I had a lengthy discussion today with the patient and his family explaining the diagnosis, the workup which essentially involves ruling out any other structural abnormality, and further treatment process, which compromised most of my today's 45 minutes visit  I explained to them that unfortunately, there is no cure for this condition, and treatment is mainly supportive care  We discussed anti spasticity medications such as baclofen that we can use to improve some of the spasticity  I provided him with a prescription for 10 mg 3 times a day, he will start with 5 mg to try 3 times a day and gradually increase the dose as he tolerates it  We also discussed the option of baclofen pump, which he will think about and would contact me if he thinks that is an option for him  I stressed the importance of stretching on a daily basis, and also provided them with a prescription for aquatherapy  Besides this, we also discussed the option of power mobility device such as a power scooter in the long run for him  In general, he walks with a cane, but does have a walker for any longer distances,  However it is getting harder for him to walk considerable distances without getting tired    We talked about the possibility of a transport chair from now, and we can consider a power scooter in the future  Patient and his wife have my contact information for any questions or concerns, other than that I will see them back in 3 months for return visit  Thank you very much for allowing me to participate in his care  Diagnoses and all orders for this visit:    Hereditary spastic paraplegia (HCC)  -     baclofen 10 mg tablet; Take 1 tablet (10 mg total) by mouth 3 (three) times a day for 30 days           Subjective:    HPI    I had the pleasure of seeing your patient in [facility] today  As you know, he is a 78-year-old man who was referred for evaluation for spasticity in the lower extremities  Please allow me to summarize history for the record  He was seen by Dr Elizabeth Corrales in July 2018  As you know, he has history of cervical spine surgery in 2007 for progressive ambulatory difficulties  After the surgery his gait stabilize up until about a year ago, when he started noticing difficulty walking  He describes his legs to be very stiff and he tends to shuffle  He does have mild numbness in the feet but denies any significant lower back pain, or weakness in his lower extremities  He denies any difficulties with the bladder bowel, denies change in his perineal sensation  There has not been any difficulty with his hands, however balance has been significantly affected  Thinking back, he has had symptoms since his spine surgery in 2007, symptoms started a year prior to the surgery  Can go up a flight of stairs, uses the handrail  Coming down is ok  No change in weight  No fasciculations  Have some urinary urgency, and some urgency with bowels as well  No muscle cramps or spasms  As a part of his workup, he had MRI of the thoracic spine which showed mild degenerative disease without any significant spinal canal stenosis or cord compression    MRI of the cervical spine showed multilevel spondylosis as well without any cord compression or abnormal enhancement  He does have moderate bilateral neuroforaminal narrowing at C3-4 and C4-5 levels  MRI of the brain was essentially normal except for mild age-appropriate atrophy  He had an EMG with me on August 20th, which was essentially a normal study for his age ,however poor activation was noted in multiple tested muscles suggesting a predominantly upper motor neuron process  The following portions of the patient's history were reviewed and updated as appropriate: allergies, current medications, past family history, past medical history, past social history, past surgical history and problem list     No family hx of similar issues in any of the family member s No one in family was wheelchair /walker to ambulate  He is using a cane to ambulate  Objective:    Blood pressure 158/78, pulse 79, height 6' 2" (1 88 m), weight 93 kg (205 lb)  Physical Exam  General exam: Pt was awake, alert and oriented  HEENT: atraumatic, normocephalic  Normal oral mucosa, neck was supple, no lymphadenopathy  Normal peripheral pulses  Extremities did not show any edema or cyanosis  Neurological Exam  Neurologically, pt was awake and alert  Speech was normal, no dysarthria or aphasia  Cranial nerve exam showed normal extraocular movements, no nystagmus or diplopia  There was no ptosis at baseline or with sustained upward gaze  Strength of eye closure muscles was normal   Facial sensations were normal bilaterally  No facial weakness, able to blow out the cheeks and push the tongue in the cheeks well  No tongue atrophy or fasciculations  Motor exam revealed normal tone in the upper extremities, increased tone in both lower extremities, with tightening at the Achilles tendons  Muscle bulk was normal throughout in both upper and lower extremities  Muscle strength was normal in neck flexors and extensors and all muscle groups in both upper extremities     Muscle strength was full in all muscle groups in both lower extremities as well, except right hip flexors which were graded as 4+/5, however all the movements in both lower extremities were performed in a very slow and poorly coordinated fashion  Reflexes were graded as 3 in the upper extremities, 3+ at both knees and ankles with sustained clonus at both ankles  There was no exaggerated jaw jerk or carmona's sign  Sensory exam revealed minimal length dependent, decreased sensation to pin and temp up to ankles  Vibration was mildly reduced at toes  Proprioception was intact  He walks with the help of a cane, and has a spastic gait  ROS:  I reviewed the below ROS and what is mentioned in HPI, the remainder of ROS was negative  Review of Systems   Constitutional: Negative  Negative for appetite change and fever  HENT: Negative  Negative for hearing loss, tinnitus, trouble swallowing and voice change  Eyes: Negative  Negative for photophobia and pain  Respiratory: Negative  Negative for shortness of breath  Cardiovascular: Negative  Negative for palpitations  Gastrointestinal: Negative  Negative for nausea  Endocrine: Negative  Negative for cold intolerance and heat intolerance  Genitourinary: Negative  Negative for dysuria, frequency and urgency  Musculoskeletal: Positive for gait problem (balance problems, difficulty walking)  Negative for myalgias and neck pain  Skin: Negative  Allergic/Immunologic: Negative  Neurological: Negative for dizziness, tremors, seizures, syncope, facial asymmetry, speech difficulty, weakness and numbness  Hematological: Negative  Does not bruise/bleed easily  Psychiatric/Behavioral: Negative for confusion and hallucinations

## 2018-09-28 ENCOUNTER — TELEPHONE (OUTPATIENT)
Dept: NEUROLOGY | Facility: CLINIC | Age: 73
End: 2018-09-28

## 2018-09-28 NOTE — ASSESSMENT & PLAN NOTE
I believe this patient has spastic paraplegia, this would be supported by his spasticity in the lower extremities, minimal sensory loss in the feet, essentially normal  imaging of the brain as well as his spine, and normal electrodiagnostic studies with poor activation suggesting a predominantly upper motor neuron process  His examination in the upper extremities is completely normal except for slightly brisk reflexes which can be seen in uncomplicated spastic paraplegia, and he does have some bladder and bowel urgency as well, consistent with the diagnosis  I had a lengthy discussion today with the patient and his family explaining the diagnosis, the workup which essentially involves ruling out any other structural abnormality, and further treatment process, which compromised most of my today's 45 minutes visit  I explained to them that unfortunately, there is no cure for this condition, and treatment is mainly supportive care  We discussed anti spasticity medications such as baclofen that we can use to improve some of the spasticity  I provided him with a prescription for 10 mg 3 times a day, he will start with 5 mg to try 3 times a day and gradually increase the dose as he tolerates it  We also discussed the option of baclofen pump, which he will think about and would contact me if he thinks that is an option for him  I stressed the importance of stretching on a daily basis, and also provided them with a prescription for aquatherapy  Besides this, we also discussed the option of power mobility device such as a power scooter in the long run for him  In general, he walks with a cane, but does have a walker for any longer distances,  However it is getting harder for him to walk considerable distances without getting tired  We talked about the possibility of a transport chair from now, and we can consider a power scooter in the future      Patient and his wife have my contact information for any questions or concerns, other than that I will see them back in 3 months for return visit  Thank you very much for allowing me to participate in his care

## 2018-09-28 NOTE — TELEPHONE ENCOUNTER
----- Message from Rosa Loving MD sent at 9/28/2018  7:52 AM EDT -----  Michelle Mike,     I saw this gentleman yesterday, he has spastic paraplegia  He doesn't have any needs right now  We were talking about his future wheelchair options, if they get a manual transport chair with their insurance, does it make it hard to get a power scooter/wheelchair approved later? Thanks for your input       Nicolas Ochoa

## 2018-09-28 NOTE — TELEPHONE ENCOUNTER
Discussed in person with Dr Delon Frank  Medicare has a 5 year rule when it comes to assistive ambulation devices (cane, walker, wheelchair) - that they will only typically pay for one of these assistive devices in a 5 year period unless there has been a significant change in patient's condition that would warrant an upgrade

## 2018-11-08 NOTE — LETTER
June 21, 2018     Mark Dillon DO  [address]  [facility] [address]    Patient: Ayala Sosa YOB: 1945   Date of Visit: 6/21/2018       Dear Dr Orlando Schneider:    Thank you for referring Beronica Reece to me for evaluation  Below are my notes for this consultation  If you have questions, please do not hesitate to call me  I look forward to following your patient along with you  Sincerely,        Ezra Barbour MD        CC: No Recipients  Ezra Barbour MD  6/21/2018  5:23 PM  Sign at close encounter  Office Note - Neurosurgery   Ayala Sosa  74 y o  male MRN: [id number]      Assessment:    Patient is stable  58-year-old gentleman with gait difficulty and upper motor neuron findings on lower extremity examination  However, recent MRI of the cervical and thoracic spine do not any significant compression on the spinal cord  While he may have lumbar spinal stenosis, I would not expect this presentation for lower motor neuron gait dysfunction  His gait and presentation do not seem to be typical for normal pressure hydrocephalus  I will refer him to Neurology for further assessment  I wonder if he may have neuromuscular disorder contributing to some of his symptoms though it would be somewhat unusual for to be restricted to the lower extremities  I spoke personally with his PCP and there does not seem to be high risk factors for infectious etiology  Alternatively, this may represent progression of previous spinal cord injury/myelopathy with aging  I will obtain an MRI of the brain without contrast to rule out a parasagittal lesion that on occasion can present with lower extremity dysfunction  I will follow up afterwards with the patient to discuss the results  History, physical examination and diagnostic tests were reviewed and questions answered  Diagnosis, care plan and treatment options were discussed   The patient and spouse/SO understand instructions and will follow up as directed  Plan:    Follow-up:  After tests complete    Problem List Items Addressed This Visit        Other    Gait disturbance - Primary    Relevant Orders    MRI brain without contrast    Ambulatory referral to Neurology          Subjective/Objective     Chief Complaint    Gait difficulty  HPI    Patient presents for follow-up of MRI of the cervical and thoracic spine  His gait difficulties which seem to have worsened over the past year so but been present since previous anterior cervical decompression and fusion have not changed significantly  He continues to deny any pain or weakness in his legs  ROS    Review of Systems   Constitutional: Negative  HENT: Negative  Eyes: Negative  Respiratory: Negative  Cardiovascular: Negative  Gastrointestinal: Negative  Endocrine: Negative  Genitourinary: Negative  Musculoskeletal: Positive for gait problem  Skin: Negative  Allergic/Immunologic: Negative  Neurological: Negative for dizziness, seizures, syncope, weakness, numbness and headaches  Balance off     Hematological: Bruises/bleeds easily  Psychiatric/Behavioral: Negative  Family History    Family History   Problem Relation Age of Onset    No Known Problems Mother     Stroke Family         ischemic       Social History    Social History     Social History    Marital status: /Civil Union     Spouse name: N/A    Number of children: N/A    Years of education: N/A     Occupational History    Not on file       Social History Main Topics    Smoking status: Current Every Day Smoker    Smokeless tobacco: Current User     Types: Chew    Alcohol use Not on file    Drug use: Unknown    Sexual activity: Not on file     Other Topics Concern    Not on file     Social History Narrative    No narrative on file       Past Medical History    Past Medical History:   Diagnosis Date    Gait disturbance 11/6/2013    Lipoma     last assessed: 1/23/2015       Surgical History    Past Surgical History:   Procedure Laterality Date    CERVICAL FUSION      vertebral       Medications      Current Outpatient Prescriptions:     amLODIPine-benazepril (LOTREL) 10-40 MG per capsule, Take by mouth, Disp: , Rfl:     aspirin 81 MG tablet, Take 1 tablet by mouth daily, Disp: , Rfl:     meloxicam (MOBIC) 7 5 mg tablet, TAKE 1 TABLET BY MOUTH EVERY DAY, Disp: 90 tablet, Rfl: 0    metoprolol tartrate (LOPRESSOR) 100 mg tablet, Take by mouth 2 (two) times a day, Disp: , Rfl:     Allergies    Allergies   Allergen Reactions    Aspirin     Ibuprofen      Reaction Date: [id number]; The following portions of the patient's history were reviewed and updated as appropriate: allergies, current medications, past family history, past medical history, past social history, past surgical history and problem list     Investigations    I personally reviewed the MRI and XRAY results with the patient:    MRI of the cervical and thoracic spine without contrast dated June 14th, 2018  Normal cervical and thoracic alignment  Mild degenerative changes  Previous ACDF within the cervical spine  No significant cervical or thoracic central canal stenosis or compression on the spinal cord  No spinal cord signal change  Foraminal stenosis at multiple levels within the cervical spine  Spinal cord is normal in signal   Visualized posterior fossa structures are unremarkable  Note is made of a sebaceous cyst over the sternum on MRI of the thoracic spine  The patient has been aware of this lesion for many years and is not changed significantly  Plain film of the cervical spine dated March 30th, 2018  Previous anterior cervical decompression and fusion  No gross instability on flexion-extension  Good bony fusion across C5-6  Physical Exam    Vitals:  Blood pressure 158/78, pulse 72, temperature 97 7 °F (36 5 °C), resp   rate 16, height 6' 2" (1 88 m), weight 93 7 kg (206 lb 9 6 oz)  ,Body mass index is 26 53 kg/m²  Physical Exam  Neurologic Exam     Motor Exam   Right leg tone: increased  Left leg tone: increased5/5 power in upper extremities  4/5 power in lower extremities       Gait, Coordination, and Reflexes     Gait  Gait: spastic no

## 2019-01-23 ENCOUNTER — APPOINTMENT (OUTPATIENT)
Dept: LAB | Facility: CLINIC | Age: 74
End: 2019-01-23
Payer: MEDICARE

## 2019-01-23 ENCOUNTER — OFFICE VISIT (OUTPATIENT)
Dept: FAMILY MEDICINE CLINIC | Facility: CLINIC | Age: 74
End: 2019-01-23
Payer: MEDICARE

## 2019-01-23 VITALS
RESPIRATION RATE: 16 BRPM | OXYGEN SATURATION: 98 % | WEIGHT: 211 LBS | HEIGHT: 74 IN | HEART RATE: 56 BPM | BODY MASS INDEX: 27.08 KG/M2 | SYSTOLIC BLOOD PRESSURE: 128 MMHG | DIASTOLIC BLOOD PRESSURE: 68 MMHG

## 2019-01-23 DIAGNOSIS — R26.9 GAIT DISTURBANCE: ICD-10-CM

## 2019-01-23 DIAGNOSIS — Z12.11 COLON CANCER SCREENING: Primary | ICD-10-CM

## 2019-01-23 DIAGNOSIS — I10 BENIGN ESSENTIAL HYPERTENSION: Primary | ICD-10-CM

## 2019-01-23 DIAGNOSIS — M47.12 OTHER SPONDYLOSIS WITH MYELOPATHY, CERVICAL REGION: ICD-10-CM

## 2019-01-23 DIAGNOSIS — Z12.5 PROSTATE CANCER SCREENING: ICD-10-CM

## 2019-01-23 DIAGNOSIS — M19.90 CHRONIC ARTHRITIS: ICD-10-CM

## 2019-01-23 DIAGNOSIS — Z12.11 COLON CANCER SCREENING: ICD-10-CM

## 2019-01-23 DIAGNOSIS — R73.09 ABNORMAL GLUCOSE: ICD-10-CM

## 2019-01-23 DIAGNOSIS — E78.00 HYPERCHOLESTEROLEMIA: ICD-10-CM

## 2019-01-23 DIAGNOSIS — I10 BENIGN ESSENTIAL HYPERTENSION: ICD-10-CM

## 2019-01-23 DIAGNOSIS — G11.4 HEREDITARY SPASTIC PARAPLEGIA (HCC): ICD-10-CM

## 2019-01-23 PROBLEM — L72.3 INFECTED SEBACEOUS CYST: Status: RESOLVED | Noted: 2017-12-13 | Resolved: 2019-01-23

## 2019-01-23 PROBLEM — L08.9 INFECTED SEBACEOUS CYST: Status: RESOLVED | Noted: 2017-12-13 | Resolved: 2019-01-23

## 2019-01-23 LAB
ALBUMIN SERPL BCP-MCNC: 3.8 G/DL (ref 3.5–5)
ALP SERPL-CCNC: 76 U/L (ref 46–116)
ALT SERPL W P-5'-P-CCNC: 49 U/L (ref 12–78)
ANION GAP SERPL CALCULATED.3IONS-SCNC: 6 MMOL/L (ref 4–13)
AST SERPL W P-5'-P-CCNC: 30 U/L (ref 5–45)
BASOPHILS # BLD AUTO: 0.04 THOUSANDS/ΜL (ref 0–0.1)
BASOPHILS NFR BLD AUTO: 1 % (ref 0–1)
BILIRUB SERPL-MCNC: 0.52 MG/DL (ref 0.2–1)
BUN SERPL-MCNC: 22 MG/DL (ref 5–25)
CALCIUM SERPL-MCNC: 8.8 MG/DL (ref 8.3–10.1)
CHLORIDE SERPL-SCNC: 103 MMOL/L (ref 100–108)
CHOLEST SERPL-MCNC: 157 MG/DL (ref 50–200)
CO2 SERPL-SCNC: 29 MMOL/L (ref 21–32)
CREAT SERPL-MCNC: 1.32 MG/DL (ref 0.6–1.3)
EOSINOPHIL # BLD AUTO: 0.32 THOUSAND/ΜL (ref 0–0.61)
EOSINOPHIL NFR BLD AUTO: 4 % (ref 0–6)
ERYTHROCYTE [DISTWIDTH] IN BLOOD BY AUTOMATED COUNT: 13.2 % (ref 11.6–15.1)
GFR SERPL CREATININE-BSD FRML MDRD: 52 ML/MIN/1.73SQ M
GLUCOSE P FAST SERPL-MCNC: 100 MG/DL (ref 65–99)
HCT VFR BLD AUTO: 50.7 % (ref 36.5–49.3)
HDLC SERPL-MCNC: 36 MG/DL (ref 40–60)
HGB BLD-MCNC: 16.5 G/DL (ref 12–17)
IMM GRANULOCYTES # BLD AUTO: 0.07 THOUSAND/UL (ref 0–0.2)
IMM GRANULOCYTES NFR BLD AUTO: 1 % (ref 0–2)
LDLC SERPL CALC-MCNC: 100 MG/DL (ref 0–100)
LYMPHOCYTES # BLD AUTO: 1.55 THOUSANDS/ΜL (ref 0.6–4.47)
LYMPHOCYTES NFR BLD AUTO: 20 % (ref 14–44)
MCH RBC QN AUTO: 29.8 PG (ref 26.8–34.3)
MCHC RBC AUTO-ENTMCNC: 32.5 G/DL (ref 31.4–37.4)
MCV RBC AUTO: 92 FL (ref 82–98)
MONOCYTES # BLD AUTO: 0.7 THOUSAND/ΜL (ref 0.17–1.22)
MONOCYTES NFR BLD AUTO: 9 % (ref 4–12)
NEUTROPHILS # BLD AUTO: 4.93 THOUSANDS/ΜL (ref 1.85–7.62)
NEUTS SEG NFR BLD AUTO: 65 % (ref 43–75)
NONHDLC SERPL-MCNC: 121 MG/DL
NRBC BLD AUTO-RTO: 0 /100 WBCS
PLATELET # BLD AUTO: 234 THOUSANDS/UL (ref 149–390)
PMV BLD AUTO: 10.9 FL (ref 8.9–12.7)
POTASSIUM SERPL-SCNC: 4.3 MMOL/L (ref 3.5–5.3)
PROT SERPL-MCNC: 7.6 G/DL (ref 6.4–8.2)
PSA SERPL-MCNC: 0.8 NG/ML (ref 0–4)
RBC # BLD AUTO: 5.54 MILLION/UL (ref 3.88–5.62)
SODIUM SERPL-SCNC: 138 MMOL/L (ref 136–145)
TRIGL SERPL-MCNC: 103 MG/DL
TSH SERPL DL<=0.05 MIU/L-ACNC: 3.55 UIU/ML (ref 0.36–3.74)
WBC # BLD AUTO: 7.61 THOUSAND/UL (ref 4.31–10.16)

## 2019-01-23 PROCEDURE — 84443 ASSAY THYROID STIM HORMONE: CPT

## 2019-01-23 PROCEDURE — 80053 COMPREHEN METABOLIC PANEL: CPT

## 2019-01-23 PROCEDURE — 83036 HEMOGLOBIN GLYCOSYLATED A1C: CPT

## 2019-01-23 PROCEDURE — 36415 COLL VENOUS BLD VENIPUNCTURE: CPT

## 2019-01-23 PROCEDURE — 99214 OFFICE O/P EST MOD 30 MIN: CPT | Performed by: FAMILY MEDICINE

## 2019-01-23 PROCEDURE — 80061 LIPID PANEL: CPT

## 2019-01-23 PROCEDURE — G0103 PSA SCREENING: HCPCS

## 2019-01-23 PROCEDURE — 85025 COMPLETE CBC W/AUTO DIFF WBC: CPT

## 2019-01-23 RX ORDER — METOPROLOL TARTRATE 100 MG/1
100 TABLET ORAL 2 TIMES DAILY
Qty: 180 TABLET | Refills: 3 | Status: SHIPPED | OUTPATIENT
Start: 2019-01-23 | End: 2020-03-06 | Stop reason: SDUPTHER

## 2019-01-23 RX ORDER — MELOXICAM 7.5 MG/1
7.5 TABLET ORAL DAILY
Qty: 90 TABLET | Refills: 3 | Status: SHIPPED | OUTPATIENT
Start: 2019-01-23 | End: 2021-04-20

## 2019-01-23 RX ORDER — AMLODIPINE BESYLATE AND BENAZEPRIL HYDROCHLORIDE 10; 40 MG/1; MG/1
1 CAPSULE ORAL DAILY
Qty: 90 CAPSULE | Refills: 3 | Status: SHIPPED | OUTPATIENT
Start: 2019-01-23 | End: 2020-03-06

## 2019-01-23 RX ORDER — BACLOFEN 10 MG/1
10 TABLET ORAL 3 TIMES DAILY
Refills: 3 | COMMUNITY
Start: 2018-11-22 | End: 2019-08-01 | Stop reason: SDUPTHER

## 2019-01-23 NOTE — ASSESSMENT & PLAN NOTE
Interesting diagnosis  Poor overall prognosis as far as regaining functionality  He is seeing Neurology in follow-up  He was given prescription for baclofen  There are making arrangements for assisted devices if and when the patient should need them  Presently he is ambulating with the assistance of a cane  I greatly appreciate the input Neurosurgery as well as Neurology    Patient and his wife are appreciative as well

## 2019-01-23 NOTE — PROGRESS NOTES
Subjective:      Patient ID: Margareth   is a 74 y o  male  Patient presents with his wife for 6 month follow-up of chronic conditions including hypertension, osteoarthritis  Patient did have very extensive workup through both Neurosurgery as well as Neurology regards to his dysfunctional gait and lower limb weakness  He has been diagnosed with hereditary spastic hemiplegia  Apparently both his brother and sister has similar gait disturbance  MRI ruled out any significant structural abnormality  He did have consultation with neurologist   Neurologist May diagnosis after reviewing all of the testing and studies that were done  He was placed on baclofen to help out with his spasticity  He is only taking this twice a day  He states that it seems to help  Neurology was discussing assistive devices to help with his gait dysfunction  Patient continues to ambulate with a cane  Patient otherwise feels well  No complaints  No recent blood work  Did not have labs completed back in July  Needs refills of medications  He does find relief of his arthritic pains with meloxicam         Past Medical History:   Diagnosis Date    Gait disturbance 11/6/2013    Lipoma     last assessed: 1/23/2015       Family History   Problem Relation Age of Onset    No Known Problems Mother     Stroke Family         ischemic       Past Surgical History:   Procedure Laterality Date    CERVICAL FUSION      vertebral        reports that he has been smoking  His smokeless tobacco use includes Chew        Current Outpatient Prescriptions:     amLODIPine-benazepril (LOTREL) 10-40 MG per capsule, Take 1 capsule by mouth daily, Disp: 90 capsule, Rfl: 3    aspirin 81 MG tablet, Take 1 tablet by mouth daily, Disp: , Rfl:     baclofen 10 mg tablet, Take 10 mg by mouth 3 (three) times a day, Disp: , Rfl: 3    meloxicam (MOBIC) 7 5 mg tablet, Take 1 tablet (7 5 mg total) by mouth daily, Disp: 90 tablet, Rfl: 3    metoprolol tartrate (LOPRESSOR) 100 mg tablet, Take 1 tablet (100 mg total) by mouth 2 (two) times a day, Disp: 180 tablet, Rfl: 3    The following portions of the patient's history were reviewed and updated as appropriate: allergies, current medications, past family history, past medical history, past social history, past surgical history and problem list     Review of Systems   HENT: Negative  Eyes: Negative  Respiratory: Negative  Cardiovascular: Negative  Gastrointestinal: Negative  Endocrine: Negative  Genitourinary: Negative  Musculoskeletal: Positive for gait problem  Skin: Negative  Allergic/Immunologic: Negative  Neurological: Positive for weakness  Hematological: Negative  Psychiatric/Behavioral: Negative  All other systems reviewed and are negative  Objective:    /68   Pulse 56   Resp 16   Ht 6' 2" (1 88 m)   Wt 95 7 kg (211 lb)   SpO2 98%   BMI 27 09 kg/m²      Physical Exam   Constitutional: He is oriented to person, place, and time  He appears well-developed and well-nourished  HENT:   Head: Normocephalic  Edentulous with dentures   Eyes: Pupils are equal, round, and reactive to light  Conjunctivae and EOM are normal    Neck: Normal range of motion  Neck supple  Cardiovascular: Normal rate, regular rhythm and normal heart sounds  Pulmonary/Chest: Effort normal and breath sounds normal    Abdominal: Soft  Bowel sounds are normal    Musculoskeletal: Normal range of motion  Neurological: He is alert and oriented to person, place, and time  He displays abnormal reflex  He exhibits abnormal muscle tone  Gait abnormal    Significant gait dysfunction and spasticity   Skin:   Large lipoma on the right anterior chest wall   Psychiatric: He has a normal mood and affect  His behavior is normal  Judgment and thought content normal    Nursing note and vitals reviewed          No results found for this or any previous visit (from the past 1008 hour(s))  Assessment/Plan:    Benign essential hypertension  Very well controlled on Lopressor and Lotrel  Continue same  Refills provided  Hereditary spastic paraplegia (HCC)  Interesting diagnosis  Poor overall prognosis as far as regaining functionality  He is seeing Neurology in follow-up  He was given prescription for baclofen  There are making arrangements for assisted devices if and when the patient should need them  Presently he is ambulating with the assistance of a cane  I greatly appreciate the input Neurosurgery as well as Neurology  Patient and his wife are appreciative as well    Abnormal glucose  Patient has history of elevated glucose  He is unable to exercise due to his spastic hemiplegia of his lower extremities  Patient does do some housework around the house with his arms when he can such as washing the dishes, laundry  No longer shovel snow or mows lawn  Check hemoglobin A1c    Colon cancer screening  Order provided for fecal immunochemical testing  Sending the patient for colonoscopy would be extremely difficult as he would have carpal difficulty running to the bathroom with bowel prep    Gait disturbance  Significant gait dysfunction due to his spastic paraplegia  Would be advisable for them to either have a wheelchair  He does have a rolling walker at home  He prefers ambulating with a cane  He has never fallen    Hypercholesterolemia  Check lipid profile  Not presently on medications  Watch dietary intake of fat and cholesterol    Prostate cancer screening  Again order provided for screening PSA  Problem List Items Addressed This Visit     Gait disturbance     Significant gait dysfunction due to his spastic paraplegia  Would be advisable for them to either have a wheelchair  He does have a rolling walker at home  He prefers ambulating with a cane    He has never fallen         RESOLVED: Other spondylosis with myelopathy, cervical region    Prostate cancer screening     Again order provided for screening PSA  Relevant Orders    PSA, Total Screen    Abnormal glucose     Patient has history of elevated glucose  He is unable to exercise due to his spastic hemiplegia of his lower extremities  Patient does do some housework around the house with his arms when he can such as washing the dishes, laundry  No longer shovel snow or mows lawn  Check hemoglobin A1c         Relevant Orders    Hemoglobin A1C    Benign essential hypertension - Primary     Very well controlled on Lopressor and Lotrel  Continue same  Refills provided  Relevant Medications    amLODIPine-benazepril (LOTREL) 10-40 MG per capsule    metoprolol tartrate (LOPRESSOR) 100 mg tablet    Other Relevant Orders    CBC and differential    Comprehensive metabolic panel    TSH, 3rd generation with Free T4 reflex    Hypercholesterolemia     Check lipid profile  Not presently on medications  Watch dietary intake of fat and cholesterol         Relevant Orders    Comprehensive metabolic panel    Lipid panel    Hereditary spastic paraplegia ([facility] [address] )     Interesting diagnosis  Poor overall prognosis as far as regaining functionality  He is seeing Neurology in follow-up  He was given prescription for baclofen  There are making arrangements for assisted devices if and when the patient should need them  Presently he is ambulating with the assistance of a cane  I greatly appreciate the input Neurosurgery as well as Neurology  Patient and his wife are appreciative as well         Colon cancer screening     Order provided for fecal immunochemical testing    Sending the patient for colonoscopy would be extremely difficult as he would have carpal difficulty running to the bathroom with bowel prep           Other Visit Diagnoses     Chronic arthritis        Relevant Medications    meloxicam (MOBIC) 7 5 mg tablet

## 2019-01-23 NOTE — ASSESSMENT & PLAN NOTE
Significant gait dysfunction due to his spastic paraplegia  Would be advisable for them to either have a wheelchair  He does have a rolling walker at home  He prefers ambulating with a cane    He has never fallen

## 2019-01-23 NOTE — ASSESSMENT & PLAN NOTE
Order provided for fecal immunochemical testing    Sending the patient for colonoscopy would be extremely difficult as he would have carpal difficulty running to the bathroom with bowel prep

## 2019-01-24 LAB
EST. AVERAGE GLUCOSE BLD GHB EST-MCNC: 157 MG/DL
HBA1C MFR BLD: 7.1 % (ref 4.2–6.3)

## 2019-01-29 DIAGNOSIS — R73.09 ABNORMAL GLUCOSE: Primary | ICD-10-CM

## 2019-08-01 ENCOUNTER — OFFICE VISIT (OUTPATIENT)
Dept: FAMILY MEDICINE CLINIC | Facility: CLINIC | Age: 74
End: 2019-08-01
Payer: MEDICARE

## 2019-08-01 VITALS
RESPIRATION RATE: 16 BRPM | TEMPERATURE: 97 F | HEART RATE: 58 BPM | DIASTOLIC BLOOD PRESSURE: 82 MMHG | OXYGEN SATURATION: 98 % | HEIGHT: 74 IN | WEIGHT: 206 LBS | SYSTOLIC BLOOD PRESSURE: 126 MMHG | BODY MASS INDEX: 26.44 KG/M2

## 2019-08-01 DIAGNOSIS — Z12.5 PROSTATE CANCER SCREENING: ICD-10-CM

## 2019-08-01 DIAGNOSIS — E78.00 HYPERCHOLESTEROLEMIA: ICD-10-CM

## 2019-08-01 DIAGNOSIS — R73.09 ABNORMAL GLUCOSE: ICD-10-CM

## 2019-08-01 DIAGNOSIS — R26.9 GAIT DISTURBANCE: ICD-10-CM

## 2019-08-01 DIAGNOSIS — H61.22 IMPACTED CERUMEN OF LEFT EAR: ICD-10-CM

## 2019-08-01 DIAGNOSIS — I10 BENIGN ESSENTIAL HYPERTENSION: ICD-10-CM

## 2019-08-01 DIAGNOSIS — G11.4 HEREDITARY SPASTIC PARAPLEGIA (HCC): ICD-10-CM

## 2019-08-01 DIAGNOSIS — Z00.00 MEDICARE ANNUAL WELLNESS VISIT, SUBSEQUENT: Primary | ICD-10-CM

## 2019-08-01 PROCEDURE — 69210 REMOVE IMPACTED EAR WAX UNI: CPT | Performed by: FAMILY MEDICINE

## 2019-08-01 PROCEDURE — G0439 PPPS, SUBSEQ VISIT: HCPCS | Performed by: FAMILY MEDICINE

## 2019-08-01 PROCEDURE — 99213 OFFICE O/P EST LOW 20 MIN: CPT | Performed by: FAMILY MEDICINE

## 2019-08-01 RX ORDER — BACLOFEN 10 MG/1
10 TABLET ORAL 3 TIMES DAILY
Qty: 90 TABLET | Refills: 3 | Status: SHIPPED | OUTPATIENT
Start: 2019-08-01 | End: 2020-03-15

## 2019-08-01 NOTE — ASSESSMENT & PLAN NOTE
Patient does have impaired fasting glucose  Wife has been cutting back on the availability refined sugars and sweets in his diet  He has lost weight    Check hemoglobin A1c

## 2019-08-01 NOTE — ASSESSMENT & PLAN NOTE
Hypertension is very well controlled on current dose of amlodipine/benazepril and metoprolol  Continue same    Refills not necessary at this time

## 2019-08-01 NOTE — PATIENT INSTRUCTIONS
Obesity   AMBULATORY CARE:   Obesity  is when your body mass index (BMI) is greater than 30  Your healthcare provider will use your height and weight to measure your BMI  The risks of obesity include  many health problems, such as injuries or physical disability  You may need tests to check for the following:  · Diabetes     · High blood pressure or high cholesterol     · Heart disease     · Gallbladder or liver disease     · Cancer of the colon, breast, prostate, liver, or kidney     · Sleep apnea     · Arthritis or gout  Seek care immediately if:   · You have a severe headache, confusion, or difficulty speaking  · You have weakness on one side of your body  · You have chest pain, sweating, or shortness of breath  Contact your healthcare provider if:   · You have symptoms of gallbladder or liver disease, such as pain in your upper abdomen  · You have knee or hip pain and discomfort while walking  · You have symptoms of diabetes, such as intense hunger and thirst, and frequent urination  · You have symptoms of sleep apnea, such as snoring or daytime sleepiness  · You have questions or concerns about your condition or care  Treatment for obesity  focuses on helping you lose weight to improve your health  Even a small decrease in BMI can reduce the risk for many health problems  Your healthcare provider will help you set a weight-loss goal   · Lifestyle changes  are the first step in treating obesity  These include making healthy food choices and getting regular physical activity  Your healthcare provider may suggest a weight-loss program that involves coaching, education, and therapy  · Medicine  may help you lose weight when it is used with a healthy diet and physical activity  · Surgery  can help you lose weight if you are very obese and have other health problems  There are several types of weight-loss surgery  Ask your healthcare provider for more information    Be successful losing weight:   · Set small, realistic goals  An example of a small goal is to walk for 20 minutes 5 days a week  Anther goal is to lose 5% of your body weight  · Tell friends, family members, and coworkers about your goals  and ask for their support  Ask a friend to lose weight with you, or join a weight-loss support group  · Identify foods or triggers that may cause you to overeat , and find ways to avoid them  Remove tempting high-calorie foods from your home and workplace  Place a bowl of fresh fruit on your kitchen counter  If stress causes you to eat, then find other ways to cope with stress  · Keep a diary to track what you eat and drink  Also write down how many minutes of physical activity you do each day  Weigh yourself once a week and record it in your diary  Eating changes: You will need to eat 500 to 1,000 fewer calories each day than you currently eat to lose 1 to 2 pounds a week  The following changes will help you cut calories:  · Eat smaller portions  Use small plates, no larger than 9 inches in diameter  Fill your plate half full of fruits and vegetables  Measure your food using measuring cups until you know what a serving size looks like  · Eat 3 meals and 1 or 2 snacks each day  Plan your meals in advance  [employer] and eat at home most of the time  Eat slowly  · Eat fruits and vegetables at every meal   They are low in calories and high in fiber, which makes you feel full  Do not add butter, margarine, or cream sauce to vegetables  Use herbs to season steamed vegetables  · Eat less fat and fewer fried foods  Eat more baked or grilled chicken and fish  These protein sources are lower in calories and fat than red meat  Limit fast food  Dress your salads with olive oil and vinegar instead of bottled dressing  · Limit the amount of sugar you eat  Do not drink sugary beverages  Limit alcohol  Activity changes:  Physical activity is good for your body in many ways   It helps you burn calories and build strong muscles  It decreases stress and depression, and improves your mood  It can also help you sleep better  Talk to your healthcare provider before you begin an exercise program   · Exercise for at least 30 minutes 5 days a week  Start slowly  Set aside time each day for physical activity that you enjoy and that is convenient for you  It is best to do both weight training and an activity that increases your heart rate, such as walking, bicycling, or swimming  · Find ways to be more active  Do yard work and housecleaning  Walk up the stairs instead of using elevators  Spend your leisure time going to events that require walking, such as outdoor festivals or fairs  This extra physical activity can help you lose weight and keep it off  Follow up with your healthcare provider as directed: You may need to meet with a dietitian  Write down your questions so you remember to ask them during your visits  © 2017 [address] Information is for End User's use only and may not be sold, redistributed or otherwise used for commercial purposes  All illustrations and images included in CareNotes® are the copyrighted property of [employer]  or Dean Chase  The above information is an  only  It is not intended as medical advice for individual conditions or treatments  Talk to your doctor, nurse or pharmacist before following any medical regimen to see if it is safe and effective for you  Urinary Incontinence   WHAT YOU NEED TO KNOW:   What is urinary incontinence? Urinary incontinence (UI) is when you lose control of your bladder  What causes UI? UI occurs because your bladder cannot store or empty urine properly  The following are the most common types of UI:  · Stress incontinence  is when you leak urine due to increased bladder pressure  This may happen when you cough, sneeze, or exercise       · Urge incontinence  is when you feel the need to urinate right away and leak urine accidentally  · Mixed incontinence  is when you have both stress and urge UI  What are the signs and symptoms of UI?   · You feel like your bladder does not empty completely when you urinate  · You urinate often and need to urinate immediately  · You leak urine when you sleep, or you wake up with the urge to urinate  · You leak urine when you cough, sneeze, exercise, or laugh  How is UI diagnosed? Your healthcare provider will ask how often you leak urine and whether you have stress or urge symptoms  Tell him which medicines you take, how often you urinate, and how much liquid you drink each day  You may need any of the following tests:  · Urine tests  may show infection or kidney function  · A pelvic exam  may be done to check for blockages  A pelvic exam will also show if your bladder, uterus, or other organs have moved out of place  · An x-ray, ultrasound, or CT  may show problems with parts of your urinary system  You may be given contrast liquid to help your organs show up better in the pictures  Tell the healthcare provider if you have ever had an allergic reaction to contrast liquid  Do not enter the MRI room with anything metal  Metal can cause serious injury  Tell the healthcare provider if you have any metal in or on your body  · A bladder scan  will show how much urine is left in your bladder after you urinate  You will be asked to urinate and then healthcare providers will use a small ultrasound machine to check the urine left in your bladder  · Cystometry  is used to check the function of your urinary system  Your healthcare provider checks the pressure in your bladder while filling it with fluid  Your bladder pressure may also be tested when your bladder is full and while you urinate  How is UI treated? · Medicines  can help strengthen your bladder control      · Electrical stimulation  is used to send a small amount of electrical energy to your pelvic floor muscles  This helps control your bladder function  Electrodes may be placed outside your body or in your rectum  For women, the electrodes may be placed in the vagina  · A bulking agent  may be injected into the wall of your urethra to make it thicker  This helps keep your urethra closed and decreases urine leakage  · Devices  such as a clamp, pessary, or tampon may help stop urine leaks  Ask your healthcare provider for more information about these and other devices  · Surgery  may be needed if other treatments do not work  Several types of surgery can help improve your bladder control  Ask your healthcare provider for more information about the surgery you may need  How can I manage my symptoms? · Do pelvic muscle exercises often  Your pelvic muscles help you stop urinating  Squeeze these muscles tight for 5 seconds, then relax for 5 seconds  Gradually work up to squeezing for 10 seconds  Do 3 sets of 15 repetitions a day, or as directed  This will help strengthen your pelvic muscles and improve bladder control  · A catheter  may be used to help empty your bladder  A catheter is a tiny, plastic tube that is put into your bladder to drain your urine  Your healthcare provider may tell you to use a catheter to prevent your bladder from getting too full and leaking urine  · Keep a UI record  Write down how often you leak urine and how much you leak  Make a note of what you were doing when you leaked urine  · Train your bladder  Go to the bathroom at set times, such as every 2 hours, even if you do not feel the urge to go  You can also try to hold your urine when you feel the urge to go  For example, hold your urine for 5 minutes when you feel the urge to go  As that becomes easier, hold your urine for 10 minutes  · Drink liquids as directed  Ask your healthcare provider how much liquid to drink each day and which liquids are best for you   You may need to limit the amount of liquid you drink to help control your urine leakage  Limit or do not have drinks that contain caffeine or alcohol  Do not drink any liquid right before you go to bed  · Prevent constipation  Eat a variety of high-fiber foods  Good examples are high-fiber cereals, beans, vegetables, and whole-grain breads  Prune juice may help make your bowel movement softer  Walking is the best way to trigger your intestines to have a bowel movement  · Exercise regularly and maintain a healthy weight  Ask your healthcare provider how much you should weigh and about the best exercise plan for you  Weight loss and exercise will decrease pressure on your bladder and help you control your leakage  Ask him to help you create a weight loss plan if you are overweight  When should I seek immediate care? · You have severe pain  · You are confused or cannot think clearly  When should I contact my healthcare provider? · You have a fever  · You see blood in your urine  · You have pain when you urinate  · You have new or worse pain, even after treatment  · Your mouth feels dry or you have vision changes  · Your urine is cloudy or smells bad  · You have questions or concerns about your condition or care  CARE AGREEMENT:   You have the right to help plan your care  Learn about your health condition and how it may be treated  Discuss treatment options with your caregivers to decide what care you want to receive  You always have the right to refuse treatment  The above information is an  only  It is not intended as medical advice for individual conditions or treatments  Talk to your doctor, nurse or pharmacist before following any medical regimen to see if it is safe and effective for you  © 2017 [address] [employer] Information is for End User's use only and may not be sold, redistributed or otherwise used for commercial purposes   All illustrations and images included in CareNotes® are the copyrighted property of [employer]  or Dean Chase  Cigarette Smoking and Your Health   AMBULATORY CARE:   Risks to your health if you smoke:  Nicotine and other chemicals found in tobacco damage every cell in your body  Even if you are a light smoker, you have an increased risk for cancer, heart disease, and lung disease  If you are pregnant or have diabetes, smoking increases your risk for complications  Benefits to your health if you stop smoking:   · You decrease respiratory symptoms such as coughing, wheezing, and shortness of breath  · You reduce your risk for cancers of the lung, mouth, throat, kidney, bladder, pancreas, stomach, and cervix  If you already have cancer, you increase the benefits of chemotherapy  You also reduce your risk for cancer returning or a second cancer from developing  · You reduce your risk for heart disease, blood clots, heart attack, and stroke  · You reduce your risk for lung infections, and diseases such as pneumonia, asthma, chronic bronchitis, and emphysema  · Your circulation improves  More oxygen can be delivered to your body  If you have diabetes, you lower your risk for complications, such as kidney, artery, and eye diseases  You also lower your risk for nerve damage  Nerve damage can lead to amputations, poor vision, and blindness  · You improve your body's ability to heal and to fight infections  Benefits to the health of others if you stop smoking:  Tobacco is harmful to nonsmokers who breathe in your secondhand smoke  The following are ways the health of others around you may improve when you stop smoking:  · You lower the risks for lung cancer and heart disease in nonsmoking adults  · If you are pregnant, you lower the risk for miscarriage, early delivery, low birth weight, and stillbirth  You also lower your baby's risk for SIDS, obesity, developmental delay, and neurobehavioral problems, such as ADHD  · If you have children, you lower their risk for ear infections, colds, pneumonia, bronchitis, and asthma  For more information and support to stop smoking:   · Smokefree  gov  Phone: 0- 594 - 948-9330  Web Address: www [employer]fr[employer]  Follow up with your healthcare provider as directed:  Write down your questions so you remember to ask them during your visits  © 2017 [facility] Information is for End User's use only and may not be sold, redistributed or otherwise used for commercial purposes  All illustrations and images included in CareNotes® are the copyrighted property of [employer]  or Dean Chase  The above information is an  only  It is not intended as medical advice for individual conditions or treatments  Talk to your doctor, nurse or pharmacist before following any medical regimen to see if it is safe and effective for you  Fall Prevention   WHAT YOU NEED TO KNOW:   What is fall prevention? Fall prevention includes ways to make your home and other areas safer  It also includes ways you can move more carefully to prevent a fall  What increases my risk for falls? · Lack of vitamin D    · Not getting enough sleep each night    · Trouble walking or keeping your balance, or foot problems    · Health conditions that cause changes in your blood pressure, vision, or muscle strength and coordination    · Medicines that make you dizzy, weak, or sleepy    · Problems seeing clearly    · Shoes that have high heels or are not supportive    · Tripping hazards, such as items left on the floor, no handrails on the stairs, or broken steps  How can I help protect myself from falls? · Stand or sit up slowly  This may help you keep your balance and prevent falls  If you need to get up during the night, sit up first  Be sure you are fully awake before you stand  Turn on the light before you start walking  Go slowly in case you are still sleepy   Make sure you will not trip over any pets sleeping in the bedroom  · Use assistive devices as directed  Your healthcare provider may suggest that you use a cane or walker to help you keep your balance  You may need to have grab bars put in your bathroom near the toilet or in the shower  · Wear shoes that fit well and have soles that   Wear shoes both inside and outside  Use slippers with good   Do not wear shoes with high heels  · Wear a personal alarm  This is a device that allows you to call 911 if you fall and need help  Ask your healthcare provider for more information  · Stay active  Exercise can help strengthen your muscles and improve your balance  Your healthcare provider may recommend water aerobics or walking  He or she may also recommend physical therapy to improve your coordination  Never start an exercise program without talking to your healthcare provider first      · Manage medical conditions  Keep all appointments with your healthcare providers  Visit your eye doctor as directed  How can I make my home safer? · Add items to prevent falls in the bathroom  Put nonslip strips on your bath or shower floor to prevent you from slipping  Use a bath mat if you do not have carpet in the bathroom  This will prevent you from falling when you step out of the bath or shower  Use a shower seat so you do not need to stand while you shower  Sit on the toilet or a chair in your bathroom to dry yourself and put on clothing  This will prevent you from losing your balance from drying or dressing yourself while you are standing  · Keep paths clear  Remove books, shoes, and other objects from walkways and stairs  Place cords for telephones and lamps out of the way so that you do not need to walk over them  Tape them down if you cannot move them  Remove small rugs  If you cannot remove a rug, secure it with double-sided tape  This will prevent you from tripping  · Install bright lights in your home  Use night lights to help light paths to the bathroom or kitchen  Always turn on the light before you start walking  · Keep items you use often on shelves within reach  Do not use a step stool to help you reach an item  · Paint or place reflective tape on the edges of your stairs  This will help you see the stairs better  Call 911 or have someone else call if:   · You have fallen and are unconscious  · You have fallen and cannot move part of your body  Contact your healthcare provider if:   · You have fallen and have pain or a headache  · You have questions or concerns about your condition or care  CARE AGREEMENT:   You have the right to help plan your care  Learn about your health condition and how it may be treated  Discuss treatment options with your caregivers to decide what care you want to receive  You always have the right to refuse treatment  The above information is an  only  It is not intended as medical advice for individual conditions or treatments  Talk to your doctor, nurse or pharmacist before following any medical regimen to see if it is safe and effective for you  © 2017 [facility] Information is for End User's use only and may not be sold, redistributed or otherwise used for commercial purposes  All illustrations and images included in CareNotes® are the copyrighted property of [employer]  or Dean Chase  Advance Directives   WHAT YOU NEED TO KNOW:   What are advance directives? Advance directives are legal documents that state your wishes and plans for medical care  These plans are made ahead of time in case you lose your ability to make decisions for yourself  Advance directives can apply to any medical decision, such as the treatments you want, and if you want to donate organs  What are the types of advance directives? There are many types of advance directives, and each state has rules about how to use them   You may choose a combination of any of the following:  · Living will: This is a written record of the treatment you want  You can also choose which treatments you do not want, which to limit, and which to stop at a certain time  This includes surgery, medicine, IV fluid, and tube feedings  · Durable power of  for healthcare [facility]): This is a written record that states who you want to make healthcare choices for you when you are unable to make them for yourself  This person, called a proxy, is usually a family member or a friend  You may choose more than 1 proxy  · Do not resuscitate (DNR) order:  A DNR order is used in case your heart stops beating or you stop breathing  It is a request not to have certain forms of treatment, such as CPR  A DNR order may be included in other types of advance directives  · Medical directive: This covers the care that you want if you are in a coma, near death, or unable to make decisions for yourself  You can list the treatments you want for each condition  Treatment may include pain medicine, surgery, blood transfusions, dialysis, IV or tube feedings, and a ventilator (breathing machine)  · Values history: This document has questions about your views, beliefs, and how you feel and think about life  This information can help others choose the care that you would choose  Why are advance directives important? An advance directive helps you control your care  Although spoken wishes may be used, it is better to have your wishes written down  Spoken wishes can be misunderstood, or not followed  Treatments may be given even if you do not want them  An advance directive may make it easier for your family to make difficult choices about your care  How do I decide what to put in my advance directives? · Make informed decisions:  Make sure you fully understand treatments or care you may receive   Think about the benefits and problems your decisions could cause for you or your family  Talk to healthcare providers if you have concerns or questions before you write down your wishes  You may also want to talk with your [employer] or , or a   Check your state laws to make sure that what you put in your advance directive is legal      · Sign all forms:  Sign and date your advance directive when you have finished  You may also need 2 witnesses to sign the forms  Witnesses cannot be your doctor or his staff, your spouse, heirs or beneficiaries, people you owe money to, or your chosen proxy  Talk to your family, proxy, and healthcare providers about your advance directive  Give each person a copy, and keep one for yourself in a place you can get to easily  Do not keep it hidden or locked away  · Review and revise your plans: You can revise your advance directive at any time, as long as you are able to make decisions  Review your plan every year, and when there are changes in your life, or your health  When you make changes, let your family, proxy, and healthcare providers know  Give each a new copy  Where can I find more information? · [employer]  [facility] [address] , [address]  Phone: 3- 242 - 820-9499  Phone: 7- 242 - 236-7654  Web Address: http://www  aafp org  · [address] [facility])  [address] [facility] [place], [place], [address]  Phone: 0- [phone] - [phone]  Phone: [phone]  Web Address: Min [place]eth  [facility] AGREEMENT:   You have the right to help plan your care  To help with this plan, you must learn about your health condition and treatment options  You must also learn about advance directives and how they are used  Work with your healthcare providers to decide what care will be used to treat you  You always have the right to refuse treatment  The above information is an  only   It is not intended as medical advice for individual conditions or treatments  Talk to your doctor, nurse or pharmacist before following any medical regimen to see if it is safe and effective for you  © 2017 [address] Information is for End User's use only and may not be sold, redistributed or otherwise used for commercial purposes  All illustrations and images included in CareNotes® are the copyrighted property of [employer]  or Dean Chase

## 2019-08-01 NOTE — PROGRESS NOTES
Subjective:      Patient ID: Kirstie Estrada  is a 74 y o  male  68-year-old male presents with his wife for subsequent annual wellness visit and follow-up of chronic conditions including hypertension, osteoarthritis of lumbar spine, hereditary spastic hemiplegia  Patient does ambulate with the assistance of a cane  Patient did have extensive evaluation with Neurology  Patient has lower extremity weakness and spasticity  He was given prescription for baclofen which she does take once per day  Patient and his wife seem to think that it helps a little bit with the spasticity of his legs  Labs from January were reviewed which showed normal PSA of 0 8, normal CBC, normal CMP with the exception of impaired fasting glucose of 100 and hemoglobin A1c of 7 1%  Total cholesterol 157, HDL 36,   Wife states that the patient does like to eat his sweets  She is trying to get him away from eating ice cream and sugar  He has lost 5 lb since his last office visit  Past Medical History:   Diagnosis Date    Gait disturbance 11/6/2013    Lipoma     last assessed: 1/23/2015       Family History   Problem Relation Age of Onset    No Known Problems Mother     Stroke Family         ischemic       Past Surgical History:   Procedure Laterality Date    CERVICAL FUSION      vertebral        reports that he has quit smoking  His smokeless tobacco use includes chew  He reports that he does not drink alcohol or use drugs        Current Outpatient Medications:     amLODIPine-benazepril (LOTREL) 10-40 MG per capsule, Take 1 capsule by mouth daily, Disp: 90 capsule, Rfl: 3    aspirin 81 MG tablet, Take 1 tablet by mouth daily, Disp: , Rfl:     baclofen 10 mg tablet, Take 1 tablet (10 mg total) by mouth 3 (three) times a day, Disp: 90 tablet, Rfl: 3    meloxicam (MOBIC) 7 5 mg tablet, Take 1 tablet (7 5 mg total) by mouth daily, Disp: 90 tablet, Rfl: 3    metoprolol tartrate (LOPRESSOR) 100 mg tablet, Take 1 tablet (100 mg total) by mouth 2 (two) times a day, Disp: 180 tablet, Rfl: 3    The following portions of the patient's history were reviewed and updated as appropriate: allergies, current medications, past family history, past medical history, past social history, past surgical history and problem list     Review of Systems   Constitutional: Negative  HENT: Negative  Eyes: Negative  Respiratory: Negative  Cardiovascular: Negative  Gastrointestinal: Negative  Endocrine: Negative  Genitourinary: Negative  Musculoskeletal: Positive for gait problem  Negative for arthralgias, back pain and joint swelling  Skin: Negative  Allergic/Immunologic: Negative  Neurological: Positive for weakness  Hematological: Negative  Psychiatric/Behavioral: Negative  All other systems reviewed and are negative  Objective:    /82   Pulse 58   Temp (!) 97 °F (36 1 °C)   Resp 16   Ht 6' 2" (1 88 m)   Wt 93 4 kg (206 lb)   SpO2 98%   BMI 26 45 kg/m²      Physical Exam   Constitutional: He is oriented to person, place, and time  He appears well-developed and well-nourished  HENT:   Head: Normocephalic  Right Ear: External ear normal    Cerumen impaction left ear   Eyes: Pupils are equal, round, and reactive to light  Conjunctivae and EOM are normal    Neck: Normal range of motion  Neck supple  Cardiovascular: Normal rate, regular rhythm and normal heart sounds  Pulmonary/Chest: Effort normal and breath sounds normal    Abdominal: Soft  Bowel sounds are normal    Musculoskeletal: Normal range of motion  He exhibits no edema or deformity  Neurological: He is alert and oriented to person, place, and time  He displays abnormal reflex  No sensory deficit  He exhibits abnormal muscle tone  Significant spasticity and hyper reflexia bilateral lower extremities   Skin:   Large lipoma on the right anterior chest wall   Psychiatric: He has a normal mood and affect   His behavior is normal  Judgment and thought content normal    Nursing note and vitals reviewed  No results found for this or any previous visit (from the past 1008 hour(s))  Assessment/Plan:    Ear cerumen removal  Date/Time: 8/1/2019 10:32 AM  Performed by: Josemanuel Dalton DO  Authorized by: Josemanuel Dalton DO     Patient location:  Clinic  Other Assisting Provider: No    Consent:     Consent obtained:  Verbal    Consent given by:  Patient    Risks discussed:  Dizziness, incomplete removal, infection, TM perforation, pain and bleeding    Alternatives discussed:  No treatment  Universal protocol:     Procedure explained and questions answered to patient or proxy's satisfaction: yes      Relevant documents present and verified: no      Test results available and properly labeled: no      Radiology Images displayed and confirmed  If images not available, report reviewed: no      Required blood products, implants, devices and special equipment available: no      Site/side marked: no      Immediately prior to procedure a time out was called: no      Patient identity confirmed:  Verbally with patient  Procedure details:     Local anesthetic:  None    Location:  L ear and external auditory canal    Procedure type: curette      Approach:  External  Post-procedure details:     Complication:  None    Hearing quality:  Improved    Patient tolerance of procedure: Tolerated well, no immediate complications      Impacted cerumen of left ear  Cerumen successfully removed in the office as per procedure note    Benign essential hypertension  Hypertension is very well controlled on current dose of amlodipine/benazepril and metoprolol  Continue same  Refills not necessary at this time    Hereditary spastic paraplegia [facility])  Patient was seen by Neurology  He did have extensive testing  Unfortunately there is not very much that can be done  He does ambulate with the assistance of a cane    He was advised that eventually his mobility would deteriorate to the point where he would need a wheelchair  Patient is understanding of this  He is appreciative of the evaluation and though he has decreased mobility he always looks at the greater side of things    -patient given a refill of baclofen which does provide some minimal relief  Not presently scheduled for follow-up Neurology  Medicare annual wellness visit, subsequent  Subsequent annual wellness visit completed    Prostate cancer screening  Repeat screening PSA in January    Hypercholesterolemia  Watch dietary intake of fat cholesterol  Check lipid profile in January    Abnormal glucose  Patient does have impaired fasting glucose  Wife has been cutting back on the availability refined sugars and sweets in his diet  He has lost weight  Check hemoglobin A1c          Problem List Items Addressed This Visit        Cardiovascular and Mediastinum    Benign essential hypertension     Hypertension is very well controlled on current dose of amlodipine/benazepril and metoprolol  Continue same  Refills not necessary at this time         Relevant Orders    CBC and differential    Comprehensive metabolic panel    TSH, 3rd generation with Free T4 reflex       Nervous and Auditory    Hereditary spastic paraplegia [facility])     Patient was seen by Neurology  He did have extensive testing  Unfortunately there is not very much that can be done  He does ambulate with the assistance of a cane  He was advised that eventually his mobility would deteriorate to the point where he would need a wheelchair  Patient is understanding of this  He is appreciative of the evaluation and though he has decreased mobility he always looks at the greater side of things    -patient given a refill of baclofen which does provide some minimal relief  Not presently scheduled for follow-up Neurology           Relevant Medications    baclofen 10 mg tablet    Impacted cerumen of left ear     Cerumen successfully removed in the office as per procedure note            Other    Gait disturbance    Prostate cancer screening     Repeat screening PSA in January         Relevant Orders    PSA, Total Screen    Medicare annual wellness visit, subsequent - Primary     Subsequent annual wellness visit completed         Abnormal glucose     Patient does have impaired fasting glucose  Wife has been cutting back on the availability refined sugars and sweets in his diet  He has lost weight  Check hemoglobin A1c         Relevant Orders    Hemoglobin A1C    Hypercholesterolemia     Watch dietary intake of fat cholesterol  Check lipid profile in January         Relevant Orders    Lipid panel          BMI Counseling: Body mass index is 26 45 kg/m²  Discussed the patient's BMI with him  The BMI is above average  BMI counseling and education was provided to the patient  Nutrition recommendations include moderation in carbohydrate intake, increasing intake of lean protein, reducing intake of saturated fat and trans fat and reducing intake of cholesterol  I have spent 41 minutes with Patient and family today in which greater than 50% of this time was spent in counseling/coordination of care regarding Diagnostic results, Prognosis, Risks and benefits of tx options, Intructions for management, Patient and family education, Importance of tx compliance, Risk factor reductions and Impressions

## 2019-08-01 NOTE — PROGRESS NOTES
Assessment and Plan:     Problem List Items Addressed This Visit     None         History of Present Illness:     Patient presents for Medicare Annual Wellness visit    Patient Care Team:  Bal Patel DO as PCP - General  DO Uriel Kc MD Sruthi Devarinti, DO (Neurology)     Problem List:     Patient Active Problem List   Diagnosis    Peripheral neuropathy    Gait disturbance    Prostate cancer screening    Medicare annual wellness visit, subsequent    Abnormal glucose    Benign essential hypertension    Benign prostatic hyperplasia without urinary obstruction    Hypercholesterolemia    Need for shingles vaccine    Hereditary spastic paraplegia ([address] )    Colon cancer screening      Past Medical and Surgical History:     Past Medical History:   Diagnosis Date    Gait disturbance 11/6/2013    Lipoma     last assessed: 1/23/2015     Past Surgical History:   Procedure Laterality Date    CERVICAL FUSION      vertebral      Family History:     Family History   Problem Relation Age of Onset    No Known Problems Mother     Stroke Family         ischemic      Social History:     Social History     Tobacco Use   Smoking Status Former Smoker   Smokeless Tobacco Current User    Types: Chew     Social History     Substance and Sexual Activity   Alcohol Use Never    Frequency: Never     Social History     Substance and Sexual Activity   Drug Use Never      Medications and Allergies:     Current Outpatient Medications   Medication Sig Dispense Refill    amLODIPine-benazepril (LOTREL) 10-40 MG per capsule Take 1 capsule by mouth daily 90 capsule 3    aspirin 81 MG tablet Take 1 tablet by mouth daily      baclofen 10 mg tablet Take 10 mg by mouth 3 (three) times a day  3    meloxicam (MOBIC) 7 5 mg tablet Take 1 tablet (7 5 mg total) by mouth daily 90 tablet 3    metoprolol tartrate (LOPRESSOR) 100 mg tablet Take 1 tablet (100 mg total) by mouth 2 (two) times a day 180 tablet 3     No current facility-administered medications for this visit  Allergies   Allergen Reactions    Aspirin     Ibuprofen      Reaction Date: [id number];       Immunizations:     Immunization History   Administered Date(s) Administered    Influenza Split High Dose Preservative Free IM 11/06/2013, 10/14/2014, 11/11/2015, 11/22/2016, 12/07/2017    Influenza TIV (IM) 09/30/2010, 09/07/2011    Pneumococcal Conjugate 13-Valent 05/18/2016    Pneumococcal Polysaccharide PPV23 04/29/2010      Medicare Screening Tests and Risk Assessments:     Cyrus Perales is here for his Subsequent Wellness visit  Last Medicare Wellness visit information reviewed, patient interviewed, no change since last AWV  Health Risk Assessment:  Patient rates overall health as good  Patient feels that their physical health rating is Slightly better  Eyesight was rated as Same  Hearing was rated as Same  Patient feels that their emotional and mental health rating is Slightly better  Pain experienced by patient in the last 7 days has been Some  Patient's pain rating has been 3/10  Patient states that he has experienced no weight loss or gain in last 6 months  Emotional/Mental Health:  Patient has not been feeling nervous/anxious  PHQ-9 Depression Screening:    Frequency of the following problems over the past two weeks:      1  Little interest or pleasure in doing things: 0 - not at all      2  Feeling down, depressed, or hopeless: 0 - not at all  PHQ-2 Score: 0          Broken Bones/Falls: Fall Risk Assessment:    In the past year, patient has experienced: No history of falling in past year          Bladder/Bowel:  Patient has not leaked urine accidently in the last six months  Patient reports no loss of bowel control  Immunizations:  Patient has had a flu vaccination within the last year  Patient has received a pneumonia shot  Patient has not received a shingles shot  Patient has not received tetanus/diphtheria shot       Home Safety:  Patient has trouble with stairs inside or outside of their home  Patient currently reports that there are no safety hazards present in home, working smoke alarms, working carbon monoxide detectors  Preventative Screenings:   prostate cancer screen performed, no colon cancer screen completed, cholesterol screen completed, no glaucoma eye exam completed    Nutrition:  Current diet: Regular with servings of the following:    Medications:  Patient is currently taking over-the-counter supplements  Patient is able to manage medications  Lifestyle Choices:  Patient reports no tobacco use  Patient has not smoked or used tobacco in the past   Patient reports no alcohol use  Patient drives a vehicle  Patient wears seat belt  Activities of Daily Living:  Can get out of bed by his or her self, able to dress self, able to make own meals, able to do own shopping, able to bathe self, can do own laundry/housekeeping, can manage own money, pay bills and track expenses    Previous Hospitalizations:  No hospitalization or ED visit in past 12 months        Advanced Directives:  Patient has decided on a power of   Patient has spoken to designated power of   Patient has not completed advanced directive          Preventative Screening/Counseling:      Cardiovascular:      General: Screening Current      Counseling: Healthy Diet and Healthy Weight          Diabetes:      General: Screening Current          Colorectal Cancer:      General: Risks and Benefits Discussed      Due for studies: Fecal Occult Blood          Prostate Cancer:      General: Screening Current          Osteoporosis:      General: Screening Current          AAA:      General: Screening Current          Glaucoma:      General: Screening Current          HIV:      General: Screening Not Indicated and Risks and Benefits Discussed          Hepatitis C:      General: Screening Current        Advanced Directives:   Patient has no living will for healthcare, does not have durable POA for healthcare, patient does not have an advanced directive  Information on ACP and/or AD provided  5 wishes given  No end of life assessment reviewed with patient  Provider does not agree with end of life deisions       Immunizations:      Influenza: Influenza Recommended Annually      Pneumococcal: Lifetime Vaccine Completed      Shingrix: Shingrix Vaccine Needed Today

## 2019-08-01 NOTE — ASSESSMENT & PLAN NOTE
Patient was seen by Neurology  He did have extensive testing  Unfortunately there is not very much that can be done  He does ambulate with the assistance of a cane  He was advised that eventually his mobility would deteriorate to the point where he would need a wheelchair  Patient is understanding of this  He is appreciative of the evaluation and though he has decreased mobility he always looks at the greater side of things    -patient given a refill of baclofen which does provide some minimal relief  Not presently scheduled for follow-up Neurology

## 2020-03-06 DIAGNOSIS — I10 BENIGN ESSENTIAL HYPERTENSION: ICD-10-CM

## 2020-03-06 RX ORDER — AMLODIPINE BESYLATE AND BENAZEPRIL HYDROCHLORIDE 10; 40 MG/1; MG/1
CAPSULE ORAL
Qty: 90 CAPSULE | Refills: 0 | Status: SHIPPED | OUTPATIENT
Start: 2020-03-06 | End: 2020-06-01

## 2020-03-06 RX ORDER — METOPROLOL TARTRATE 100 MG/1
100 TABLET ORAL 2 TIMES DAILY
Qty: 180 TABLET | Refills: 0 | Status: SHIPPED | OUTPATIENT
Start: 2020-03-06 | End: 2020-06-01

## 2020-03-15 DIAGNOSIS — G11.4 HEREDITARY SPASTIC PARAPLEGIA (HCC): ICD-10-CM

## 2020-03-15 RX ORDER — BACLOFEN 10 MG/1
TABLET ORAL
Qty: 90 TABLET | Refills: 3 | Status: SHIPPED | OUTPATIENT
Start: 2020-03-15 | End: 2021-01-31

## 2020-03-19 ENCOUNTER — OFFICE VISIT (OUTPATIENT)
Dept: FAMILY MEDICINE CLINIC | Facility: CLINIC | Age: 75
End: 2020-03-19
Payer: MEDICARE

## 2020-03-19 ENCOUNTER — APPOINTMENT (OUTPATIENT)
Dept: LAB | Facility: CLINIC | Age: 75
End: 2020-03-19
Payer: MEDICARE

## 2020-03-19 VITALS
BODY MASS INDEX: 26.69 KG/M2 | HEART RATE: 61 BPM | RESPIRATION RATE: 16 BRPM | WEIGHT: 208 LBS | SYSTOLIC BLOOD PRESSURE: 124 MMHG | OXYGEN SATURATION: 97 % | HEIGHT: 74 IN | DIASTOLIC BLOOD PRESSURE: 72 MMHG

## 2020-03-19 DIAGNOSIS — E78.00 HYPERCHOLESTEROLEMIA: ICD-10-CM

## 2020-03-19 DIAGNOSIS — R73.09 ABNORMAL GLUCOSE: ICD-10-CM

## 2020-03-19 DIAGNOSIS — Z12.5 PROSTATE CANCER SCREENING: ICD-10-CM

## 2020-03-19 DIAGNOSIS — Z12.11 COLON CANCER SCREENING: ICD-10-CM

## 2020-03-19 DIAGNOSIS — L57.0 ACTINIC KERATOSES: ICD-10-CM

## 2020-03-19 DIAGNOSIS — I10 BENIGN ESSENTIAL HYPERTENSION: Primary | ICD-10-CM

## 2020-03-19 DIAGNOSIS — R26.9 GAIT DISTURBANCE: ICD-10-CM

## 2020-03-19 DIAGNOSIS — G11.4 HEREDITARY SPASTIC PARAPLEGIA (HCC): ICD-10-CM

## 2020-03-19 DIAGNOSIS — I10 BENIGN ESSENTIAL HYPERTENSION: ICD-10-CM

## 2020-03-19 PROBLEM — H61.22 IMPACTED CERUMEN OF LEFT EAR: Status: RESOLVED | Noted: 2019-08-01 | Resolved: 2020-03-19

## 2020-03-19 LAB
ALBUMIN SERPL BCP-MCNC: 3.9 G/DL (ref 3.5–5)
ALP SERPL-CCNC: 102 U/L (ref 46–116)
ALT SERPL W P-5'-P-CCNC: 42 U/L (ref 12–78)
ANION GAP SERPL CALCULATED.3IONS-SCNC: 6 MMOL/L (ref 4–13)
AST SERPL W P-5'-P-CCNC: 26 U/L (ref 5–45)
BASOPHILS # BLD AUTO: 0.05 THOUSANDS/ΜL (ref 0–0.1)
BASOPHILS NFR BLD AUTO: 0 % (ref 0–1)
BILIRUB SERPL-MCNC: 0.85 MG/DL (ref 0.2–1)
BUN SERPL-MCNC: 20 MG/DL (ref 5–25)
CALCIUM SERPL-MCNC: 8.9 MG/DL (ref 8.3–10.1)
CHLORIDE SERPL-SCNC: 104 MMOL/L (ref 100–108)
CHOLEST SERPL-MCNC: 155 MG/DL (ref 50–200)
CO2 SERPL-SCNC: 29 MMOL/L (ref 21–32)
CREAT SERPL-MCNC: 1.64 MG/DL (ref 0.6–1.3)
EOSINOPHIL # BLD AUTO: 0.15 THOUSAND/ΜL (ref 0–0.61)
EOSINOPHIL NFR BLD AUTO: 1 % (ref 0–6)
ERYTHROCYTE [DISTWIDTH] IN BLOOD BY AUTOMATED COUNT: 13.5 % (ref 11.6–15.1)
EST. AVERAGE GLUCOSE BLD GHB EST-MCNC: 126 MG/DL
GFR SERPL CREATININE-BSD FRML MDRD: 40 ML/MIN/1.73SQ M
GLUCOSE P FAST SERPL-MCNC: 107 MG/DL (ref 65–99)
HBA1C MFR BLD: 6 %
HCT VFR BLD AUTO: 50.9 % (ref 36.5–49.3)
HDLC SERPL-MCNC: 32 MG/DL
HGB BLD-MCNC: 16.4 G/DL (ref 12–17)
IMM GRANULOCYTES # BLD AUTO: 0.09 THOUSAND/UL (ref 0–0.2)
IMM GRANULOCYTES NFR BLD AUTO: 1 % (ref 0–2)
LDLC SERPL CALC-MCNC: 100 MG/DL (ref 0–100)
LYMPHOCYTES # BLD AUTO: 1.62 THOUSANDS/ΜL (ref 0.6–4.47)
LYMPHOCYTES NFR BLD AUTO: 14 % (ref 14–44)
MCH RBC QN AUTO: 29.3 PG (ref 26.8–34.3)
MCHC RBC AUTO-ENTMCNC: 32.2 G/DL (ref 31.4–37.4)
MCV RBC AUTO: 91 FL (ref 82–98)
MONOCYTES # BLD AUTO: 0.69 THOUSAND/ΜL (ref 0.17–1.22)
MONOCYTES NFR BLD AUTO: 6 % (ref 4–12)
NEUTROPHILS # BLD AUTO: 9.09 THOUSANDS/ΜL (ref 1.85–7.62)
NEUTS SEG NFR BLD AUTO: 78 % (ref 43–75)
NONHDLC SERPL-MCNC: 123 MG/DL
NRBC BLD AUTO-RTO: 0 /100 WBCS
PLATELET # BLD AUTO: 253 THOUSANDS/UL (ref 149–390)
PMV BLD AUTO: 10.9 FL (ref 8.9–12.7)
POTASSIUM SERPL-SCNC: 3.8 MMOL/L (ref 3.5–5.3)
PROT SERPL-MCNC: 7.7 G/DL (ref 6.4–8.2)
PSA SERPL-MCNC: 0.6 NG/ML (ref 0–4)
RBC # BLD AUTO: 5.6 MILLION/UL (ref 3.88–5.62)
SODIUM SERPL-SCNC: 139 MMOL/L (ref 136–145)
TRIGL SERPL-MCNC: 115 MG/DL
TSH SERPL DL<=0.05 MIU/L-ACNC: 3.09 UIU/ML (ref 0.36–3.74)
WBC # BLD AUTO: 11.69 THOUSAND/UL (ref 4.31–10.16)

## 2020-03-19 PROCEDURE — 80053 COMPREHEN METABOLIC PANEL: CPT

## 2020-03-19 PROCEDURE — 85025 COMPLETE CBC W/AUTO DIFF WBC: CPT

## 2020-03-19 PROCEDURE — 1160F RVW MEDS BY RX/DR IN RCRD: CPT | Performed by: FAMILY MEDICINE

## 2020-03-19 PROCEDURE — G0103 PSA SCREENING: HCPCS

## 2020-03-19 PROCEDURE — 3078F DIAST BP <80 MM HG: CPT | Performed by: FAMILY MEDICINE

## 2020-03-19 PROCEDURE — 99215 OFFICE O/P EST HI 40 MIN: CPT | Performed by: FAMILY MEDICINE

## 2020-03-19 PROCEDURE — 80061 LIPID PANEL: CPT

## 2020-03-19 PROCEDURE — 4040F PNEUMOC VAC/ADMIN/RCVD: CPT | Performed by: FAMILY MEDICINE

## 2020-03-19 PROCEDURE — 3074F SYST BP LT 130 MM HG: CPT | Performed by: FAMILY MEDICINE

## 2020-03-19 PROCEDURE — 84443 ASSAY THYROID STIM HORMONE: CPT

## 2020-03-19 PROCEDURE — 83036 HEMOGLOBIN GLYCOSYLATED A1C: CPT

## 2020-03-19 PROCEDURE — 36415 COLL VENOUS BLD VENIPUNCTURE: CPT

## 2020-03-24 NOTE — ASSESSMENT & PLAN NOTE
Patient does ambulate with the assistance of a walker and or a cane  Unfortunately there is no effective treatment for hereditary spastic paraplegia  They can expect this to worsen over time  Both the patient is wife are aware of this  There was suggestions in the past of physical therapy however patient is comfortable with his paraplegia    He has been living his life with this for many years

## 2020-03-24 NOTE — ASSESSMENT & PLAN NOTE
Due to due to hereditary spastic hemiplegia  Patient was seen by Neurology  There were recommendations for physical therapy which the patient declined  Patient does ambulate with the assistance of a walker or a cane

## 2020-03-24 NOTE — PROGRESS NOTES
Subjective:      Patient ID: Shwetha Schmitt  is a 66 y o  male  57-year-old male presents with his wife for 6 month follow-up of chronic conditions including hypertension, osteoarthritis of the lumbar spine, hereditary spastic hemiplegia  Patient and his wife states that his walking has become considerably worse due to his spastic hemiplegia  At home he does ambulate with the assistance of a 2 wheel walker  When he is going out he does prefer to use a cane  It does take him some time to get around  No recent falls  Patient did have workup with Neurology  Patient does have prescription for baclofen which she does take once daily  Labs were reviewed with patient and his wife  Slight elevation of the patient's white blood count at 11 69 but no fevers or chills  CMP showed fasting glucose of 107 and mild elevation of creatinine at 1 64  Patient does not drink much fluid throughout the course of the day as his spastic hemiplegia makes it difficult for him to get to the bathroom  Hemoglobin A1c down to 6 0% from 7 1%  Patient has been watching his dietary intake of carbohydrates  Weight remains stable  Aside from the weakness in his legs the patient does not offer any complaints  Hypertension remains well controlled        Past Medical History:   Diagnosis Date    Gait disturbance 11/6/2013    Lipoma     last assessed: 1/23/2015       Family History   Problem Relation Age of Onset    No Known Problems Mother     Stroke Family         ischemic       Past Surgical History:   Procedure Laterality Date    CERVICAL FUSION      vertebral        reports that he has quit smoking  His smokeless tobacco use includes chew  He reports that he does not drink alcohol or use drugs        Current Outpatient Medications:     amLODIPine-benazepril (LOTREL) 10-40 MG per capsule, TAKE 1 CAPSULE BY MOUTH EVERY DAY, Disp: 90 capsule, Rfl: 0    aspirin 81 MG tablet, Take 1 tablet by mouth daily, Disp: , Rfl:    baclofen 10 mg tablet, TAKE 1 TABLET BY MOUTH THREE TIMES A DAY, Disp: 90 tablet, Rfl: 3    meloxicam (MOBIC) 7 5 mg tablet, Take 1 tablet (7 5 mg total) by mouth daily, Disp: 90 tablet, Rfl: 3    metoprolol tartrate (LOPRESSOR) 100 mg tablet, Take 1 tablet (100 mg total) by mouth 2 (two) times a day, Disp: 180 tablet, Rfl: 0    The following portions of the patient's history were reviewed and updated as appropriate: allergies, current medications, past family history, past medical history, past social history, past surgical history and problem list     Review of Systems   Constitutional: Negative  HENT: Negative  Eyes: Negative  Respiratory: Negative  Cardiovascular: Negative  Gastrointestinal: Negative  Endocrine: Negative  Genitourinary: Negative  Musculoskeletal: Positive for gait problem  Skin: Negative  Allergic/Immunologic: Negative  Neurological: Positive for weakness (Bilateral lower extremities)  Hematological: Negative  Psychiatric/Behavioral: Negative  All other systems reviewed and are negative  Objective:    /72   Pulse 61   Resp 16   Ht 6' 2" (1 88 m)   Wt 94 3 kg (208 lb)   SpO2 97%   BMI 26 71 kg/m²      Physical Exam   Constitutional: He is oriented to person, place, and time  He appears well-developed and well-nourished  HENT:   Head: Normocephalic and atraumatic  Right Ear: External ear normal    Left Ear: External ear normal    Nose: Nose normal    Mouth/Throat: Oropharynx is clear and moist    Eyes: Pupils are equal, round, and reactive to light  Conjunctivae and EOM are normal    Neck: Normal range of motion  Neck supple  Cardiovascular: Normal rate, regular rhythm and normal heart sounds  No murmur heard  Pulmonary/Chest: Effort normal and breath sounds normal    Abdominal: Soft  Bowel sounds are normal    Musculoskeletal: Normal range of motion  He exhibits no edema     Neurological: He is alert and oriented to person, place, and time  He displays abnormal reflex (Hyper reflexia bilateral lower extremities)  He exhibits abnormal muscle tone  Significant spasticity and hyperreflexia bilateral lower extremities  This is largely unchanged   Skin:   Large lipoma right anterior chest wall   Psychiatric: He has a normal mood and affect  His behavior is normal  Judgment and thought content normal    Nursing note and vitals reviewed          Recent Results (from the past 1008 hour(s))   CBC and differential    Collection Time: 03/19/20 10:14 AM   Result Value Ref Range    WBC 11 69 (H) 4 31 - 10 16 Thousand/uL    RBC 5 60 3 88 - 5 62 Million/uL    Hemoglobin 16 4 12 0 - 17 0 g/dL    Hematocrit 50 9 (H) 36 5 - 49 3 %    MCV 91 82 - 98 fL    MCH 29 3 26 8 - 34 3 pg    MCHC 32 2 31 4 - 37 4 g/dL    RDW 13 5 11 6 - 15 1 %    MPV 10 9 8 9 - 12 7 fL    Platelets 228 435 - 841 Thousands/uL    nRBC 0 /100 WBCs    Neutrophils Relative 78 (H) 43 - 75 %    Immat GRANS % 1 0 - 2 %    Lymphocytes Relative 14 14 - 44 %    Monocytes Relative 6 4 - 12 %    Eosinophils Relative 1 0 - 6 %    Basophils Relative 0 0 - 1 %    Neutrophils Absolute 9 09 (H) 1 85 - 7 62 Thousands/µL    Immature Grans Absolute 0 09 0 00 - 0 20 Thousand/uL    Lymphocytes Absolute 1 62 0 60 - 4 47 Thousands/µL    Monocytes Absolute 0 69 0 17 - 1 22 Thousand/µL    Eosinophils Absolute 0 15 0 00 - 0 61 Thousand/µL    Basophils Absolute 0 05 0 00 - 0 10 Thousands/µL   Comprehensive metabolic panel    Collection Time: 03/19/20 10:14 AM   Result Value Ref Range    Sodium 139 136 - 145 mmol/L    Potassium 3 8 3 5 - 5 3 mmol/L    Chloride 104 100 - 108 mmol/L    CO2 29 21 - 32 mmol/L    ANION GAP 6 4 - 13 mmol/L    BUN 20 5 - 25 mg/dL    Creatinine 1 64 (H) 0 60 - 1 30 mg/dL    Glucose, Fasting 107 (H) 65 - 99 mg/dL    Calcium 8 9 8 3 - 10 1 mg/dL    AST 26 5 - 45 U/L    ALT 42 12 - 78 U/L    Alkaline Phosphatase 102 46 - 116 U/L    Total Protein 7 7 6 4 - 8 2 g/dL    Albumin 3 9 3 5 - 5 0 g/dL    Total Bilirubin 0 85 0 20 - 1 00 mg/dL    eGFR 40 ml/min/1 73sq m   Hemoglobin A1C    Collection Time: 03/19/20 10:14 AM   Result Value Ref Range    Hemoglobin A1C 6 0 (H) Normal 3 8-5 6%; PreDiabetic 5 7-6 4%; Diabetic >=6 5%; Glycemic control for adults with diabetes <7 0% %     mg/dl   Lipid panel    Collection Time: 03/19/20 10:14 AM   Result Value Ref Range    Cholesterol 155 50 - 200 mg/dL    Triglycerides 115 <=150 mg/dL    HDL, Direct 32 (L) >=40 mg/dL    LDL Calculated 100 0 - 100 mg/dL    Non-HDL-Chol (CHOL-HDL) 123 mg/dl   TSH, 3rd generation with Free T4 reflex    Collection Time: 03/19/20 10:14 AM   Result Value Ref Range    TSH 3RD GENERATON 3 090 0 358 - 3 740 uIU/mL   PSA, Total Screen    Collection Time: 03/19/20 10:14 AM   Result Value Ref Range    PSA 0 6 0 0 - 4 0 ng/mL       Assessment/Plan:    Abnormal glucose  Patient has made significant changes to his dietary intake of carbohydrates  Hemoglobin A1c has improved from 7 1-6 0%    Benign essential hypertension  Hypertension remains well controlled on amlodipine, benazepril and metoprolol  Continue same  Re-evaluate in 6 months    Hereditary spastic paraplegia [facility])  Patient does ambulate with the assistance of a walker and or a cane  Unfortunately there is no effective treatment for hereditary spastic paraplegia  They can expect this to worsen over time  Both the patient is wife are aware of this  There was suggestions in the past of physical therapy however patient is comfortable with his paraplegia  He has been living his life with this for many years    Actinic keratoses  Patient does have several actinic keratoses of the face  I would like for him to be seen by Dermatology  Patient does have extensive actinic damage of face and bilateral forearms    He did work outside for most all of his life with significant sun exposure    Colon cancer screening  Patient does have significant difficulty with mobility  Sending him for colonoscopy would be extremely difficult for him to take the bowel prep and make it to the bathroom  Will be sent for Cologuard testing    Gait disturbance  Due to due to hereditary spastic hemiplegia  Patient was seen by Neurology  There were recommendations for physical therapy which the patient declined  Patient does ambulate with the assistance of a walker or a cane  Hypercholesterolemia  Patient has done fairly well watching dietary intake of fat and cholesterol  Repeat lipid profile in 6 months  Continue with dietary modification          Problem List Items Addressed This Visit        Cardiovascular and Mediastinum    Benign essential hypertension - Primary     Hypertension remains well controlled on amlodipine, benazepril and metoprolol  Continue same  Re-evaluate in 6 months            Nervous and Auditory    Hereditary spastic paraplegia [facility])     Patient does ambulate with the assistance of a walker and or a cane  Unfortunately there is no effective treatment for hereditary spastic paraplegia  They can expect this to worsen over time  Both the patient is wife are aware of this  There was suggestions in the past of physical therapy however patient is comfortable with his paraplegia  He has been living his life with this for many years            Musculoskeletal and Integument    Actinic keratoses     Patient does have several actinic keratoses of the face  I would like for him to be seen by Dermatology  Patient does have extensive actinic damage of face and bilateral forearms  He did work outside for most all of his life with significant sun exposure         Relevant Orders    Ambulatory referral to Dermatology       Other    Abnormal glucose     Patient has made significant changes to his dietary intake of carbohydrates  Hemoglobin A1c has improved from 7 1-6 0%         Colon cancer screening     Patient does have significant difficulty with mobility    Sending him for colonoscopy would be extremely difficult for him to take the bowel prep and make it to the bathroom  Will be sent for Cologuard testing         Relevant Orders    Cologuard    Gait disturbance     Due to due to hereditary spastic hemiplegia  Patient was seen by Neurology  There were recommendations for physical therapy which the patient declined  Patient does ambulate with the assistance of a walker or a cane  Hypercholesterolemia     Patient has done fairly well watching dietary intake of fat and cholesterol  Repeat lipid profile in 6 months  Continue with dietary modification               I have spent 41 minutes with Patient and family today in which greater than 50% of this time was spent in counseling/coordination of care regarding Diagnostic results, Prognosis, Risks and benefits of tx options, Intructions for management, Patient and family education, Importance of tx compliance, Risk factor reductions and Impressions

## 2020-03-24 NOTE — ASSESSMENT & PLAN NOTE
Patient has made significant changes to his dietary intake of carbohydrates    Hemoglobin A1c has improved from 7 1-6 0%

## 2020-03-24 NOTE — ASSESSMENT & PLAN NOTE
Patient does have significant difficulty with mobility  Sending him for colonoscopy would be extremely difficult for him to take the bowel prep and make it to the bathroom    Will be sent for Cologuard testing

## 2020-03-24 NOTE — ASSESSMENT & PLAN NOTE
Hypertension remains well controlled on amlodipine, benazepril and metoprolol  Continue same    Re-evaluate in 6 months

## 2020-03-24 NOTE — ASSESSMENT & PLAN NOTE
Patient does have several actinic keratoses of the face  I would like for him to be seen by Dermatology  Patient does have extensive actinic damage of face and bilateral forearms    He did work outside for most all of his life with significant sun exposure

## 2020-03-24 NOTE — ASSESSMENT & PLAN NOTE
Patient has done fairly well watching dietary intake of fat and cholesterol  Repeat lipid profile in 6 months    Continue with dietary modification

## 2020-03-25 DIAGNOSIS — G11.4 HEREDITARY SPASTIC PARAPLEGIA (HCC): ICD-10-CM

## 2020-03-25 DIAGNOSIS — E78.00 HYPERCHOLESTEROLEMIA: ICD-10-CM

## 2020-03-25 DIAGNOSIS — I10 BENIGN ESSENTIAL HYPERTENSION: Primary | ICD-10-CM

## 2020-03-25 DIAGNOSIS — R73.09 ABNORMAL GLUCOSE: ICD-10-CM

## 2020-06-01 DIAGNOSIS — I10 BENIGN ESSENTIAL HYPERTENSION: ICD-10-CM

## 2020-06-01 RX ORDER — METOPROLOL TARTRATE 100 MG/1
TABLET ORAL
Qty: 180 TABLET | Refills: 1 | Status: SHIPPED | OUTPATIENT
Start: 2020-06-01 | End: 2021-04-20 | Stop reason: SDUPTHER

## 2020-06-01 RX ORDER — AMLODIPINE BESYLATE AND BENAZEPRIL HYDROCHLORIDE 10; 40 MG/1; MG/1
CAPSULE ORAL
Qty: 90 CAPSULE | Refills: 1 | Status: SHIPPED | OUTPATIENT
Start: 2020-06-01 | End: 2021-04-01 | Stop reason: SDUPTHER

## 2021-01-31 DIAGNOSIS — G11.4 HEREDITARY SPASTIC PARAPLEGIA (HCC): ICD-10-CM

## 2021-01-31 RX ORDER — BACLOFEN 10 MG/1
TABLET ORAL
Qty: 90 TABLET | Refills: 3 | Status: SHIPPED | OUTPATIENT
Start: 2021-01-31 | End: 2022-02-09

## 2021-02-12 DIAGNOSIS — Z23 ENCOUNTER FOR IMMUNIZATION: ICD-10-CM

## 2021-03-11 ENCOUNTER — IMMUNIZATIONS (OUTPATIENT)
Dept: FAMILY MEDICINE CLINIC | Facility: HOSPITAL | Age: 76
End: 2021-03-11

## 2021-03-11 DIAGNOSIS — Z23 ENCOUNTER FOR IMMUNIZATION: Primary | ICD-10-CM

## 2021-03-11 PROCEDURE — 91300 SARS-COV-2 / COVID-19 MRNA VACCINE (PFIZER-BIONTECH) 30 MCG: CPT

## 2021-03-11 PROCEDURE — 0001A SARS-COV-2 / COVID-19 MRNA VACCINE (PFIZER-BIONTECH) 30 MCG: CPT

## 2021-04-01 DIAGNOSIS — Z12.5 PROSTATE CANCER SCREENING: ICD-10-CM

## 2021-04-01 DIAGNOSIS — G60.9 IDIOPATHIC PERIPHERAL NEUROPATHY: ICD-10-CM

## 2021-04-01 DIAGNOSIS — R73.09 ABNORMAL GLUCOSE: ICD-10-CM

## 2021-04-01 DIAGNOSIS — G11.4 HEREDITARY SPASTIC PARAPLEGIA (HCC): ICD-10-CM

## 2021-04-01 DIAGNOSIS — I10 BENIGN ESSENTIAL HYPERTENSION: ICD-10-CM

## 2021-04-01 DIAGNOSIS — E78.00 HYPERCHOLESTEROLEMIA: Primary | ICD-10-CM

## 2021-04-01 RX ORDER — AMLODIPINE BESYLATE AND BENAZEPRIL HYDROCHLORIDE 10; 40 MG/1; MG/1
1 CAPSULE ORAL DAILY
Qty: 90 CAPSULE | Refills: 1 | Status: SHIPPED | OUTPATIENT
Start: 2021-04-01 | End: 2022-05-02 | Stop reason: SDUPTHER

## 2021-04-02 ENCOUNTER — IMMUNIZATIONS (OUTPATIENT)
Dept: FAMILY MEDICINE CLINIC | Facility: HOSPITAL | Age: 76
End: 2021-04-02

## 2021-04-02 DIAGNOSIS — Z23 ENCOUNTER FOR IMMUNIZATION: Primary | ICD-10-CM

## 2021-04-02 PROCEDURE — 91300 SARS-COV-2 / COVID-19 MRNA VACCINE (PFIZER-BIONTECH) 30 MCG: CPT

## 2021-04-02 PROCEDURE — 0002A SARS-COV-2 / COVID-19 MRNA VACCINE (PFIZER-BIONTECH) 30 MCG: CPT

## 2021-04-13 ENCOUNTER — APPOINTMENT (OUTPATIENT)
Dept: LAB | Facility: CLINIC | Age: 76
End: 2021-04-13
Payer: MEDICARE

## 2021-04-13 ENCOUNTER — TRANSCRIBE ORDERS (OUTPATIENT)
Dept: LAB | Facility: CLINIC | Age: 76
End: 2021-04-13

## 2021-04-13 DIAGNOSIS — G11.4 HEREDITARY SPASTIC PARAPLEGIA (HCC): ICD-10-CM

## 2021-04-13 DIAGNOSIS — Z12.5 PROSTATE CANCER SCREENING: ICD-10-CM

## 2021-04-13 DIAGNOSIS — R73.09 ABNORMAL GLUCOSE: ICD-10-CM

## 2021-04-13 DIAGNOSIS — E78.00 HYPERCHOLESTEROLEMIA: ICD-10-CM

## 2021-04-13 DIAGNOSIS — G60.9 IDIOPATHIC PERIPHERAL NEUROPATHY: ICD-10-CM

## 2021-04-13 LAB
ALBUMIN SERPL BCP-MCNC: 3.8 G/DL (ref 3.5–5)
ALP SERPL-CCNC: 79 U/L (ref 46–116)
ALT SERPL W P-5'-P-CCNC: 35 U/L (ref 12–78)
ANION GAP SERPL CALCULATED.3IONS-SCNC: 4 MMOL/L (ref 4–13)
AST SERPL W P-5'-P-CCNC: 20 U/L (ref 5–45)
BASOPHILS # BLD AUTO: 0.03 THOUSANDS/ΜL (ref 0–0.1)
BASOPHILS NFR BLD AUTO: 0 % (ref 0–1)
BILIRUB SERPL-MCNC: 0.86 MG/DL (ref 0.2–1)
BUN SERPL-MCNC: 18 MG/DL (ref 5–25)
CALCIUM SERPL-MCNC: 8.8 MG/DL (ref 8.3–10.1)
CHLORIDE SERPL-SCNC: 106 MMOL/L (ref 100–108)
CHOLEST SERPL-MCNC: 159 MG/DL (ref 50–200)
CO2 SERPL-SCNC: 29 MMOL/L (ref 21–32)
CREAT SERPL-MCNC: 1.33 MG/DL (ref 0.6–1.3)
EOSINOPHIL # BLD AUTO: 0.2 THOUSAND/ΜL (ref 0–0.61)
EOSINOPHIL NFR BLD AUTO: 3 % (ref 0–6)
ERYTHROCYTE [DISTWIDTH] IN BLOOD BY AUTOMATED COUNT: 13.3 % (ref 11.6–15.1)
EST. AVERAGE GLUCOSE BLD GHB EST-MCNC: 123 MG/DL
GFR SERPL CREATININE-BSD FRML MDRD: 51 ML/MIN/1.73SQ M
GLUCOSE P FAST SERPL-MCNC: 115 MG/DL (ref 65–99)
HBA1C MFR BLD: 5.9 %
HCT VFR BLD AUTO: 52.2 % (ref 36.5–49.3)
HDLC SERPL-MCNC: 31 MG/DL
HGB BLD-MCNC: 17.1 G/DL (ref 12–17)
IMM GRANULOCYTES # BLD AUTO: 0.06 THOUSAND/UL (ref 0–0.2)
IMM GRANULOCYTES NFR BLD AUTO: 1 % (ref 0–2)
LDLC SERPL CALC-MCNC: 110 MG/DL (ref 0–100)
LYMPHOCYTES # BLD AUTO: 1.6 THOUSANDS/ΜL (ref 0.6–4.47)
LYMPHOCYTES NFR BLD AUTO: 21 % (ref 14–44)
MCH RBC QN AUTO: 29.1 PG (ref 26.8–34.3)
MCHC RBC AUTO-ENTMCNC: 32.8 G/DL (ref 31.4–37.4)
MCV RBC AUTO: 89 FL (ref 82–98)
MONOCYTES # BLD AUTO: 0.55 THOUSAND/ΜL (ref 0.17–1.22)
MONOCYTES NFR BLD AUTO: 7 % (ref 4–12)
NEUTROPHILS # BLD AUTO: 5.09 THOUSANDS/ΜL (ref 1.85–7.62)
NEUTS SEG NFR BLD AUTO: 68 % (ref 43–75)
NONHDLC SERPL-MCNC: 128 MG/DL
NRBC BLD AUTO-RTO: 0 /100 WBCS
PLATELET # BLD AUTO: 241 THOUSANDS/UL (ref 149–390)
PMV BLD AUTO: 10.2 FL (ref 8.9–12.7)
POTASSIUM SERPL-SCNC: 4.2 MMOL/L (ref 3.5–5.3)
PROT SERPL-MCNC: 7.6 G/DL (ref 6.4–8.2)
PSA SERPL-MCNC: 0.4 NG/ML (ref 0–4)
RBC # BLD AUTO: 5.88 MILLION/UL (ref 3.88–5.62)
SODIUM SERPL-SCNC: 139 MMOL/L (ref 136–145)
TRIGL SERPL-MCNC: 91 MG/DL
TSH SERPL DL<=0.05 MIU/L-ACNC: 2.33 UIU/ML (ref 0.36–3.74)
WBC # BLD AUTO: 7.53 THOUSAND/UL (ref 4.31–10.16)

## 2021-04-13 PROCEDURE — 80053 COMPREHEN METABOLIC PANEL: CPT

## 2021-04-13 PROCEDURE — G0103 PSA SCREENING: HCPCS

## 2021-04-13 PROCEDURE — 85025 COMPLETE CBC W/AUTO DIFF WBC: CPT

## 2021-04-13 PROCEDURE — 84443 ASSAY THYROID STIM HORMONE: CPT

## 2021-04-13 PROCEDURE — 83036 HEMOGLOBIN GLYCOSYLATED A1C: CPT

## 2021-04-13 PROCEDURE — 80061 LIPID PANEL: CPT

## 2021-04-13 PROCEDURE — 36415 COLL VENOUS BLD VENIPUNCTURE: CPT

## 2021-04-20 ENCOUNTER — OFFICE VISIT (OUTPATIENT)
Dept: FAMILY MEDICINE CLINIC | Facility: CLINIC | Age: 76
End: 2021-04-20
Payer: MEDICARE

## 2021-04-20 VITALS
OXYGEN SATURATION: 97 % | RESPIRATION RATE: 16 BRPM | BODY MASS INDEX: 26.31 KG/M2 | SYSTOLIC BLOOD PRESSURE: 128 MMHG | HEART RATE: 81 BPM | HEIGHT: 74 IN | WEIGHT: 205 LBS | DIASTOLIC BLOOD PRESSURE: 68 MMHG

## 2021-04-20 DIAGNOSIS — R73.09 ABNORMAL GLUCOSE: ICD-10-CM

## 2021-04-20 DIAGNOSIS — Z12.83 SKIN CANCER SCREENING: ICD-10-CM

## 2021-04-20 DIAGNOSIS — Z12.5 PROSTATE CANCER SCREENING: ICD-10-CM

## 2021-04-20 DIAGNOSIS — I10 BENIGN ESSENTIAL HYPERTENSION: ICD-10-CM

## 2021-04-20 DIAGNOSIS — G11.4 HEREDITARY SPASTIC PARAPLEGIA (HCC): ICD-10-CM

## 2021-04-20 DIAGNOSIS — L57.0 ACTINIC KERATOSES: ICD-10-CM

## 2021-04-20 DIAGNOSIS — Z00.00 MEDICARE ANNUAL WELLNESS VISIT, SUBSEQUENT: Primary | ICD-10-CM

## 2021-04-20 DIAGNOSIS — E78.00 HYPERCHOLESTEROLEMIA: ICD-10-CM

## 2021-04-20 PROCEDURE — 99214 OFFICE O/P EST MOD 30 MIN: CPT | Performed by: FAMILY MEDICINE

## 2021-04-20 PROCEDURE — G0439 PPPS, SUBSEQ VISIT: HCPCS | Performed by: FAMILY MEDICINE

## 2021-04-20 PROCEDURE — 1123F ACP DISCUSS/DSCN MKR DOCD: CPT | Performed by: FAMILY MEDICINE

## 2021-04-20 RX ORDER — METOPROLOL TARTRATE 100 MG/1
100 TABLET ORAL 2 TIMES DAILY
Qty: 180 TABLET | Refills: 1 | Status: SHIPPED | OUTPATIENT
Start: 2021-04-20 | End: 2022-05-02 | Stop reason: SDUPTHER

## 2021-04-20 NOTE — ASSESSMENT & PLAN NOTE
Patient has once again made dietary changes  Hemoglobin A1c has improved yet again down to 5 9%    Repeat hemoglobin A1c in 1 year

## 2021-04-20 NOTE — PATIENT INSTRUCTIONS
Medicare Preventive Visit Patient Instructions  Thank you for completing your Welcome to Medicare Visit or Medicare Annual Wellness Visit today  Your next wellness visit will be due in one year (4/21/2022)  The screening/preventive services that you may require over the next 5-10 years are detailed below  Some tests may not apply to you based off risk factors and/or age  Screening tests ordered at today's visit but not completed yet may show as past due  Also, please note that scanned in results may not display below  Preventive Screenings:  Service Recommendations Previous Testing/Comments   Colorectal Cancer Screening  · Colonoscopy    · Fecal Occult Blood Test (FOBT)/Fecal Immunochemical Test (FIT)  · Fecal DNA/Cologuard Test  · Flexible Sigmoidoscopy Age: 54-65 years old   Colonoscopy: every 10 years (May be performed more frequently if at higher risk)  OR  FOBT/FIT: every 1 year  OR  Cologuard: every 3 years  OR  Sigmoidoscopy: every 5 years  Screening may be recommended earlier than age 48 if at higher risk for colorectal cancer  Also, an individualized decision between you and your healthcare provider will decide whether screening between the ages of 74-80 would be appropriate   Colonoscopy: Not on file  FOBT/FIT: Not on file  Cologuard: Not on file  Sigmoidoscopy: Not on file    Risks and Benefits Discussed  Due for Fecal Occult Blood     Prostate Cancer Screening Individualized decision between patient and health care provider in men between ages of 53-78   Medicare will cover every 12 months beginning on the day after your 50th birthday PSA: 0 4 ng/mL     Screening Not Indicated     Hepatitis C Screening Once for adults born between 1945 and 1965  More frequently in patients at high risk for Hepatitis C Hep C Antibody: Not on file    Screening Current   Diabetes Screening 1-2 times per year if you're at risk for diabetes or have pre-diabetes Fasting glucose: 115 mg/dL   A1C: 5 9 %    Screening Current Cholesterol Screening Once every 5 years if you don't have a lipid disorder  May order more often based on risk factors  Lipid panel: 04/13/2021    Screening Not Indicated  History Lipid Disorder      Other Preventive Screenings Covered by Medicare:  1  Abdominal Aortic Aneurysm (AAA) Screening: covered once if your at risk  You're considered to be at risk if you have a family history of AAA or a male between the age of 73-68 who smoking at least 100 cigarettes in your lifetime  2  Lung Cancer Screening: covers low dose CT scan once per year if you meet all of the following conditions: (1) Age 50-69; (2) No signs or symptoms of lung cancer; (3) Current smoker or have quit smoking within the last 15 years; (4) You have a tobacco smoking history of at least 30 pack years (packs per day x number of years you smoked); (5) You get a written order from a healthcare provider  3  Glaucoma Screening: covered annually if you're considered high risk: (1) You have diabetes OR (2) Family history of glaucoma OR (3)  aged 48 and older OR (3)  American aged 72 and older  3  Osteoporosis Screening: covered every 2 years if you meet one of the following conditions: (1) Have a vertebral abnormality; (2) On glucocorticoid therapy for more than 3 months; (3) Have primary hyperparathyroidism; (4) On osteoporosis medications and need to assess response to drug therapy  5  HIV Screening: covered annually if you're between the age of 12-76  Also covered annually if you are younger than 13 and older than 72 with risk factors for HIV infection  For pregnant patients, it is covered up to 3 times per pregnancy      Immunizations:  Immunization Recommendations   Influenza Vaccine Annual influenza vaccination during flu season is recommended for all persons aged >= 6 months who do not have contraindications   Pneumococcal Vaccine (Prevnar and Pneumovax)  * Prevnar = PCV13  * Pneumovax = PPSV23 Adults 25-60 years old: 1-3 doses may be recommended based on certain risk factors  Adults 72 years old: Prevnar (PCV13) vaccine recommended followed by Pneumovax (PPSV23) vaccine  If already received PPSV23 since turning 65, then PCV13 recommended at least one year after PPSV23 dose  Hepatitis B Vaccine 3 dose series if at intermediate or high risk (ex: diabetes, end stage renal disease, liver disease)   Tetanus (Td) Vaccine - COST NOT COVERED BY MEDICARE PART B Following completion of primary series, a booster dose should be given every 10 years to maintain immunity against tetanus  Td may also be given as tetanus wound prophylaxis  Tdap Vaccine - COST NOT COVERED BY MEDICARE PART B Recommended at least once for all adults  For pregnant patients, recommended with each pregnancy  Shingles Vaccine (Shingrix) - COST NOT COVERED BY MEDICARE PART B  2 shot series recommended in those aged 48 and above     Health Maintenance Due:  There are no preventive care reminders to display for this patient  Immunizations Due:  There are no preventive care reminders to display for this patient  Advance Directives   What are advance directives? Advance directives are legal documents that state your wishes and plans for medical care  These plans are made ahead of time in case you lose your ability to make decisions for yourself  Advance directives can apply to any medical decision, such as the treatments you want, and if you want to donate organs  What are the types of advance directives? There are many types of advance directives, and each state has rules about how to use them  You may choose a combination of any of the following:  · Living will: This is a written record of the treatment you want  You can also choose which treatments you do not want, which to limit, and which to stop at a certain time  This includes surgery, medicine, IV fluid, and tube feedings  · Durable power of  for healthcare [facility]):   This is a written record that states who you want to make healthcare choices for you when you are unable to make them for yourself  This person, called a proxy, is usually a family member or a friend  You may choose more than 1 proxy  · Do not resuscitate (DNR) order:  A DNR order is used in case your heart stops beating or you stop breathing  It is a request not to have certain forms of treatment, such as CPR  A DNR order may be included in other types of advance directives  · Medical directive: This covers the care that you want if you are in a coma, near death, or unable to make decisions for yourself  You can list the treatments you want for each condition  Treatment may include pain medicine, surgery, blood transfusions, dialysis, IV or tube feedings, and a ventilator (breathing machine)  · Values history: This document has questions about your views, beliefs, and how you feel and think about life  This information can help others choose the care that you would choose  Why are advance directives important? An advance directive helps you control your care  Although spoken wishes may be used, it is better to have your wishes written down  Spoken wishes can be misunderstood, or not followed  Treatments may be given even if you do not want them  An advance directive may make it easier for your family to make difficult choices about your care  How to Quit Using Smokeless Tobacco   Why it is important to stop using smokeless tobacco:  Smokeless tobacco comes in many forms  Examples include chew, snuff, dip, dissolvable tobacco, and snus  All smokeless tobacco products contain nicotine and may contain as much nicotine as 3 cigarettes  You may be physically dependent on nicotine  You may also be emotionally addicted to it  The cravings can be strong, but it is important to quit using smokeless tobacco  You will improve your health and decrease your cancer, stroke, and heart attack risk   Mouth sores and tooth problems will also improve when you quit  You can benefit from quitting no matter how long you have used smokeless tobacco    Prepare to stop using smokeless tobacco:  Nicotine is a highly addictive drug  Withdrawal symptoms can happen when you stop and make it hard to quit  The following can help keep you on track:  · Set a quit date  · Tell friends, family, and coworkers that you plan to quit  · Remove all smokeless tobacco products from your home, car, and workplace  Manage weight gain after you quit:  Nicotine can affect your metabolism  You may gain a few pounds after you quit  The following can help you control your weight:  · Eat healthy foods  · Drink water before, during, and between meals  · Exercise as directed  © Copyright [employer] 2018 Information is for End User's use only and may not be sold, redistributed or otherwise used for commercial purposes   All illustrations and images included in CareNotes® are the copyrighted property of A D A M [employer]  or [address] [place] [address] LYYN

## 2021-04-20 NOTE — ASSESSMENT & PLAN NOTE
Referral to dermatology for evaluation    He was referred last March but that did not happen due to corona virus pandemic

## 2021-04-20 NOTE — PROGRESS NOTES
Assessment and Plan:     Problem List Items Addressed This Visit     None         History of Present Illness:     Patient presents for Medicare Annual Wellness visit    Patient Care Team:  Maxine Dickerson DO as PCP - General  DO Leyla Hutchison MD Sruthi Devarinti, DO (Neurology)     Problem List:     Patient Active Problem List   Diagnosis    Peripheral neuropathy    Gait disturbance    Prostate cancer screening    Medicare annual wellness visit, subsequent    Abnormal glucose    Benign essential hypertension    Benign prostatic hyperplasia without urinary obstruction    Hypercholesterolemia    Need for shingles vaccine    Hereditary spastic paraplegia ([address] )    Colon cancer screening    Actinic keratoses      Past Medical and Surgical History:     Past Medical History:   Diagnosis Date    Gait disturbance 11/6/2013    Lipoma     last assessed: 1/23/2015     Past Surgical History:   Procedure Laterality Date    CERVICAL FUSION      vertebral      Family History:     Family History   Problem Relation Age of Onset    No Known Problems Mother     Stroke Family         ischemic      Social History:     Social History     Tobacco Use   Smoking Status Former Smoker   Smokeless Tobacco Current User    Types: Chew     Social History     Substance and Sexual Activity   Alcohol Use Never    Frequency: Never     Social History     Substance and Sexual Activity   Drug Use Never      Medications and Allergies:     Current Outpatient Medications   Medication Sig Dispense Refill    amLODIPine-benazepril (LOTREL) 10-40 MG per capsule Take 1 capsule by mouth daily 90 capsule 1    aspirin 81 MG tablet Take 1 tablet by mouth daily      baclofen 10 mg tablet TAKE 1 TABLET BY MOUTH THREE TIMES A DAY 90 tablet 3    meloxicam (MOBIC) 7 5 mg tablet Take 1 tablet (7 5 mg total) by mouth daily 90 tablet 3    metoprolol tartrate (LOPRESSOR) 100 mg tablet TAKE 1 TABLET BY MOUTH TWICE A  tablet 1 No current facility-administered medications for this visit  Allergies   Allergen Reactions    Ibuprofen      Reaction Date: [id number];       Immunizations:     Immunization History   Administered Date(s) Administered    Influenza Split High Dose Preservative Free IM 11/06/2013, 10/14/2014, 11/11/2015, 11/22/2016, 12/07/2017, 11/15/2019    Influenza, seasonal, injectable 09/30/2010, 09/07/2011    Pneumococcal Conjugate 13-Valent 05/18/2016    Pneumococcal Polysaccharide PPV23 04/29/2010    SARS-CoV-2 / COVID-19 mRNA IM (Pf[employer]-BioNT[employer]) 03/11/2021, 04/02/2021      Medicare Screening Tests and Risk Assessments:     Reilly Vance is here for his Subsequent Wellness visit  Last Medicare Wellness visit information reviewed, patient interviewed, no change since last AWV  Health Risk Assessment:   Patient rates overall health as good  Patient feels that their physical health rating is Slightly better  Patient is satisfied with their life  Eyesight was rated as Same  Hearing was rated as Same  Patient feels that their emotional and mental health rating is Slightly better  Patients states they are never, rarely angry  Patient states they are sometimes unusually tired/fatigued  Pain experienced in the last 7 days has been Some  Patient states that he has experienced no weight loss or gain in last 6 months  Depression Screening:   PHQ-2 Score: 0      Fall Risk Screening: In the past year, patient has experienced: no history of falling in past year      Home Safety:  Patient has trouble with stairs inside or outside of their home  Patient has working smoke alarms and has no working carbon monoxide detector  Home safety hazards include: none  Nutrition:   Current diet is Regular  Medications:   Patient is currently taking over-the-counter supplements  OTC medications include: see medication list  Patient is able to manage medications       Activities of Daily Living (ADLs)/Instrumental Activities of Daily Living (IADLs):   Walk and transfer into and out of bed and chair?: Yes  Dress and groom yourself?: Yes    Bathe or shower yourself?: Yes    Feed yourself?  Yes  Do your laundry/housekeeping?: Yes  Manage your money, pay your bills and track your expenses?: Yes  Make your own meals?: Yes    Do your own shopping?: Yes    Previous Hospitalizations:   Any hospitalizations or ED visits within the last 12 months?: No      Advance Care Planning:   Living will: No    Durable POA for healthcare: No    Advanced directive: No    Advanced directive counseling given: Yes    Five wishes given: Yes    End of Life Decisions reviewed with patient: No    Provider agrees with end of life decisions: No      PREVENTIVE SCREENINGS      Cardiovascular Screening:    General: Screening Not Indicated and History Lipid Disorder      Diabetes Screening:     General: Screening Current      Colorectal Cancer Screening:     General: Risks and Benefits Discussed    Due for: Fecal Occult Blood      Prostate Cancer Screening:    General: Screening Not Indicated      Osteoporosis Screening:    General: Screening Current      Abdominal Aortic Aneurysm (AAA) Screening:    Risk factors include: tobacco use        General: Screening Current      Lung Cancer Screening:     General: Screening Not Indicated      Hepatitis C Screening:    General: Screening Current    Screening, Brief Intervention, and Referral to Treatment (SBIRT)    Screening      AUDIT-C Screenin) How often did you have a drink containing alcohol in the past year? never  2) How many drinks did you have on a typical day when you were drinking in the past year? never  3) How often did you have 6 or more drinks on one occasion in the past year? never    AUDIT-C Score: 0  Interpretation: Score 0-3 (male): Negative screen for alcohol misuse    Single Item Drug Screening:  How often have you used an illegal drug (including marijuana) or a prescription medication for non-medical reasons in the past year? never    Single Item Drug Screen Score: 0  Interpretation: Negative screen for possible drug use disorder      BMI Counseling: Body mass index is 26 32 kg/m²  The BMI is above normal  Nutrition recommendations include reducing intake of saturated and trans fat and reducing intake of cholesterol  Exercise recommendations include strength training exercises

## 2021-04-20 NOTE — PROGRESS NOTES
Subjective:      Patient ID: Rima Francisco  is a 66 y o  male  77-year-old male presents with his wife for subsequent annual wellness visit and follow-up of chronic conditions including hypertension, osteoarthritis of the lumbar spine, hereditary spastic hemiplegia  Patient has receive COVID-19 vaccination  Patient does ambulate with the assistance of a walker  Patient has been evaluated extensively with Neurosurgery and Neurology  Not a surgical case  Patient feels very good  He has no pain whatsoever  He does have weakness of his lower extremities  Patient did have labs performed which showed total cholesterol 159, triglycerides 91, HDL 31, , normal CBC, CMP with the exception of impaired fasting glucose at 115, hemoglobin A1c at 5 9%, TSH   PSA normal at 0 4  For aside from his gait the patient states that he feels great  "Strong like a bull"    Patient has been trying to cut down on the amount of soda that he drinks  He drinks 1 can of soda per day and his wife gets in to drink 4 bottles of water per day      Past Medical History:   Diagnosis Date    Gait disturbance 11/6/2013    Lipoma     last assessed: 1/23/2015       Family History   Problem Relation Age of Onset    No Known Problems Mother     Stroke Family         ischemic       Past Surgical History:   Procedure Laterality Date    CERVICAL FUSION      vertebral        reports that he has quit smoking  His smokeless tobacco use includes chew  He reports that he does not drink alcohol or use drugs        Current Outpatient Medications:     amLODIPine-benazepril (LOTREL) 10-40 MG per capsule, Take 1 capsule by mouth daily, Disp: 90 capsule, Rfl: 1    aspirin 81 MG tablet, Take 1 tablet by mouth daily, Disp: , Rfl:     metoprolol tartrate (LOPRESSOR) 100 mg tablet, Take 1 tablet (100 mg total) by mouth 2 (two) times a day, Disp: 180 tablet, Rfl: 1    baclofen 10 mg tablet, TAKE 1 TABLET BY MOUTH THREE TIMES A DAY, Disp: 90 tablet, Rfl: 3    The following portions of the patient's history were reviewed and updated as appropriate: allergies, current medications, past family history, past medical history, past social history, past surgical history and problem list     Review of Systems   Constitutional: Negative  HENT: Negative  Eyes: Negative  Respiratory: Negative  Cardiovascular: Negative  Gastrointestinal: Negative  Endocrine: Negative  Genitourinary: Negative  Musculoskeletal: Positive for gait problem ( ambulates with the assistance of a walker)  Skin: Negative  Allergic/Immunologic: Negative  Neurological: Positive for weakness (Bilateral lower extremities)  Hematological: Negative  Psychiatric/Behavioral: Negative  All other systems reviewed and are negative  Objective:    /68   Pulse 81   Resp 16   Ht 6' 2" (1 88 m)   Wt 93 kg (205 lb)   SpO2 97%   BMI 26 32 kg/m²      Physical Exam  Vitals signs and nursing note reviewed  Constitutional:       General: He is not in acute distress  Appearance: Normal appearance  He is well-developed and normal weight  He is not ill-appearing  HENT:      Head: Normocephalic and atraumatic  Right Ear: Tympanic membrane, ear canal and external ear normal       Left Ear: Tympanic membrane, ear canal and external ear normal       Nose: Nose normal       Mouth/Throat:      Mouth: Mucous membranes are moist    Eyes:      Extraocular Movements: Extraocular movements intact  Conjunctiva/sclera: Conjunctivae normal       Pupils: Pupils are equal, round, and reactive to light  Neck:      Musculoskeletal: Normal range of motion and neck supple  Cardiovascular:      Rate and Rhythm: Normal rate and regular rhythm  Pulses: Normal pulses  Heart sounds: Normal heart sounds  No murmur  Pulmonary:      Effort: Pulmonary effort is normal       Breath sounds: Normal breath sounds     Abdominal:      General: Abdomen is flat  Bowel sounds are normal       Palpations: Abdomen is soft  Musculoskeletal: Normal range of motion  Skin:     General: Skin is warm and dry  Findings: Rash ( seborrheic keratosis ease as well as actinic damage upper extremities, scalp) present  Comments: Large right anterior chest wall   Neurological:      General: No focal deficit present  Mental Status: He is alert and oriented to person, place, and time  Motor: Weakness (Bilateral lower extremities) present  Gait: Gait abnormal       Deep Tendon Reflexes: Reflexes abnormal ( hyperreflexia lower extremities)  Psychiatric:         Mood and Affect: Mood normal          Behavior: Behavior normal          Thought Content:  Thought content normal          Judgment: Judgment normal            Recent Results (from the past 1008 hour(s))   CBC and differential    Collection Time: 04/13/21  8:34 AM   Result Value Ref Range    WBC 7 53 4 31 - 10 16 Thousand/uL    RBC 5 88 (H) 3 88 - 5 62 Million/uL    Hemoglobin 17 1 (H) 12 0 - 17 0 g/dL    Hematocrit 52 2 (H) 36 5 - 49 3 %    MCV 89 82 - 98 fL    MCH 29 1 26 8 - 34 3 pg    MCHC 32 8 31 4 - 37 4 g/dL    RDW 13 3 11 6 - 15 1 %    MPV 10 2 8 9 - 12 7 fL    Platelets 691 220 - 341 Thousands/uL    nRBC 0 /100 WBCs    Neutrophils Relative 68 43 - 75 %    Immat GRANS % 1 0 - 2 %    Lymphocytes Relative 21 14 - 44 %    Monocytes Relative 7 4 - 12 %    Eosinophils Relative 3 0 - 6 %    Basophils Relative 0 0 - 1 %    Neutrophils Absolute 5 09 1 85 - 7 62 Thousands/µL    Immature Grans Absolute 0 06 0 00 - 0 20 Thousand/uL    Lymphocytes Absolute 1 60 0 60 - 4 47 Thousands/µL    Monocytes Absolute 0 55 0 17 - 1 22 Thousand/µL    Eosinophils Absolute 0 20 0 00 - 0 61 Thousand/µL    Basophils Absolute 0 03 0 00 - 0 10 Thousands/µL   Comprehensive metabolic panel    Collection Time: 04/13/21  8:34 AM   Result Value Ref Range    Sodium 139 136 - 145 mmol/L    Potassium 4 2 3 5 - 5 3 mmol/L    Chloride 106 100 - 108 mmol/L    CO2 29 21 - 32 mmol/L    ANION GAP 4 4 - 13 mmol/L    BUN 18 5 - 25 mg/dL    Creatinine 1 33 (H) 0 60 - 1 30 mg/dL    Glucose, Fasting 115 (H) 65 - 99 mg/dL    Calcium 8 8 8 3 - 10 1 mg/dL    AST 20 5 - 45 U/L    ALT 35 12 - 78 U/L    Alkaline Phosphatase 79 46 - 116 U/L    Total Protein 7 6 6 4 - 8 2 g/dL    Albumin 3 8 3 5 - 5 0 g/dL    Total Bilirubin 0 86 0 20 - 1 00 mg/dL    eGFR 51 ml/min/1 73sq m   Hemoglobin A1C    Collection Time: 04/13/21  8:34 AM   Result Value Ref Range    Hemoglobin A1C 5 9 (H) Normal 3 8-5 6%; PreDiabetic 5 7-6 4%; Diabetic >=6 5%; Glycemic control for adults with diabetes <7 0% %     mg/dl   Lipid panel    Collection Time: 04/13/21  8:34 AM   Result Value Ref Range    Cholesterol 159 50 - 200 mg/dL    Triglycerides 91 <=150 mg/dL    HDL, Direct 31 (L) >=40 mg/dL    LDL Calculated 110 (H) 0 - 100 mg/dL    Non-HDL-Chol (CHOL-HDL) 128 mg/dl   TSH, 3rd generation with Free T4 reflex    Collection Time: 04/13/21  8:34 AM   Result Value Ref Range    TSH 3RD GENERATON 2 330 0 358 - 3 740 uIU/mL   PSA, Total Screen    Collection Time: 04/13/21  8:34 AM   Result Value Ref Range    PSA 0 4 0 0 - 4 0 ng/mL       Assessment/Plan:    Benign essential hypertension  Well controlled on amlodipine, benazepril and metoprolol  Continue same  Refills provided    Hereditary spastic paraplegia [facility])  Patient ambulates with the assistance of a walker or a cane  No effective treatment for this condition  It will progressively worsened over time  They are aware  Patient is comfortable with his status at this point in time  Actinic keratoses  Referral to dermatology for evaluation  He was referred last March but that did not happen due to corona virus pandemic    Abnormal glucose  Patient has once again made dietary changes  Hemoglobin A1c has improved yet again down to 5 9%    Repeat hemoglobin A1c in 1 year    Hypercholesterolemia  Patient does watch dietary intake of fat cholesterol  His cholesterol profile is excellent at this time  HDL could be a little bit higher  Repeat lipid profile in 1 year    Medicare annual wellness visit, subsequent  Subsequent annual wellness visit completed  Patient is current on screenings          Problem List Items Addressed This Visit        Cardiovascular and Mediastinum    Benign essential hypertension     Well controlled on amlodipine, benazepril and metoprolol  Continue same  Refills provided         Relevant Medications    metoprolol tartrate (LOPRESSOR) 100 mg tablet    Other Relevant Orders    CBC and differential    TSH, 3rd generation with Free T4 reflex       Nervous and Auditory    Hereditary spastic paraplegia (HCC)     Patient ambulates with the assistance of a walker or a cane  No effective treatment for this condition  It will progressively worsened over time  They are aware  Patient is comfortable with his status at this point in time  Relevant Orders    TSH, 3rd generation with Free T4 reflex       Musculoskeletal and Integument    Actinic keratoses     Referral to dermatology for evaluation  He was referred last March but that did not happen due to corona virus pandemic            Other    Abnormal glucose     Patient has once again made dietary changes  Hemoglobin A1c has improved yet again down to 5 9%  Repeat hemoglobin A1c in 1 year         Relevant Orders    Hemoglobin A1C    Hypercholesterolemia     Patient does watch dietary intake of fat cholesterol  His cholesterol profile is excellent at this time  HDL could be a little bit higher  Repeat lipid profile in 1 year         Relevant Orders    Comprehensive metabolic panel    Lipid panel    Medicare annual wellness visit, subsequent - Primary     Subsequent annual wellness visit completed    Patient is current on screenings         Relevant Orders    TSH, 3rd generation with Free T4 reflex    Prostate cancer screening    Relevant Orders    PSA, Total Screen    Skin cancer screening    Relevant Orders    Ambulatory referral to Dermatology

## 2021-04-20 NOTE — ASSESSMENT & PLAN NOTE
Patient ambulates with the assistance of a walker or a cane  No effective treatment for this condition  It will progressively worsened over time  They are aware  Patient is comfortable with his status at this point in time

## 2021-04-20 NOTE — ASSESSMENT & PLAN NOTE
Patient does watch dietary intake of fat cholesterol  His cholesterol profile is excellent at this time  HDL could be a little bit higher    Repeat lipid profile in 1 year

## 2022-02-09 DIAGNOSIS — G11.4 HEREDITARY SPASTIC PARAPLEGIA (HCC): ICD-10-CM

## 2022-02-09 RX ORDER — BACLOFEN 10 MG/1
TABLET ORAL
Qty: 90 TABLET | Refills: 3 | Status: SHIPPED | OUTPATIENT
Start: 2022-02-09

## 2022-05-02 ENCOUNTER — LAB (OUTPATIENT)
Dept: LAB | Facility: CLINIC | Age: 77
End: 2022-05-02
Payer: MEDICARE

## 2022-05-02 ENCOUNTER — OFFICE VISIT (OUTPATIENT)
Dept: FAMILY MEDICINE CLINIC | Facility: CLINIC | Age: 77
End: 2022-05-02
Payer: MEDICARE

## 2022-05-02 VITALS
HEIGHT: 74 IN | DIASTOLIC BLOOD PRESSURE: 72 MMHG | OXYGEN SATURATION: 97 % | HEART RATE: 56 BPM | BODY MASS INDEX: 26.31 KG/M2 | SYSTOLIC BLOOD PRESSURE: 134 MMHG | WEIGHT: 205 LBS | RESPIRATION RATE: 16 BRPM

## 2022-05-02 DIAGNOSIS — N18.30 STAGE 3 CHRONIC KIDNEY DISEASE, UNSPECIFIED WHETHER STAGE 3A OR 3B CKD (HCC): ICD-10-CM

## 2022-05-02 DIAGNOSIS — I10 BENIGN ESSENTIAL HYPERTENSION: ICD-10-CM

## 2022-05-02 DIAGNOSIS — G11.4 HEREDITARY SPASTIC PARAPLEGIA (HCC): ICD-10-CM

## 2022-05-02 DIAGNOSIS — R73.09 ABNORMAL GLUCOSE: ICD-10-CM

## 2022-05-02 DIAGNOSIS — E78.00 HYPERCHOLESTEROLEMIA: ICD-10-CM

## 2022-05-02 DIAGNOSIS — Z12.5 PROSTATE CANCER SCREENING: ICD-10-CM

## 2022-05-02 DIAGNOSIS — Z00.00 MEDICARE ANNUAL WELLNESS VISIT, SUBSEQUENT: Primary | ICD-10-CM

## 2022-05-02 LAB
ALBUMIN SERPL BCP-MCNC: 3.7 G/DL (ref 3.5–5)
ALP SERPL-CCNC: 82 U/L (ref 46–116)
ALT SERPL W P-5'-P-CCNC: 39 U/L (ref 12–78)
ANION GAP SERPL CALCULATED.3IONS-SCNC: 3 MMOL/L (ref 4–13)
AST SERPL W P-5'-P-CCNC: 21 U/L (ref 5–45)
BASOPHILS # BLD AUTO: 0.06 THOUSANDS/ΜL (ref 0–0.1)
BASOPHILS NFR BLD AUTO: 1 % (ref 0–1)
BILIRUB SERPL-MCNC: 0.78 MG/DL (ref 0.2–1)
BUN SERPL-MCNC: 22 MG/DL (ref 5–25)
CALCIUM SERPL-MCNC: 9 MG/DL (ref 8.3–10.1)
CHLORIDE SERPL-SCNC: 108 MMOL/L (ref 100–108)
CHOLEST SERPL-MCNC: 157 MG/DL
CO2 SERPL-SCNC: 29 MMOL/L (ref 21–32)
CREAT SERPL-MCNC: 1.26 MG/DL (ref 0.6–1.3)
EOSINOPHIL # BLD AUTO: 0.33 THOUSAND/ΜL (ref 0–0.61)
EOSINOPHIL NFR BLD AUTO: 4 % (ref 0–6)
ERYTHROCYTE [DISTWIDTH] IN BLOOD BY AUTOMATED COUNT: 13.6 % (ref 11.6–15.1)
EST. AVERAGE GLUCOSE BLD GHB EST-MCNC: 126 MG/DL
GFR SERPL CREATININE-BSD FRML MDRD: 54 ML/MIN/1.73SQ M
GLUCOSE P FAST SERPL-MCNC: 133 MG/DL (ref 65–99)
HBA1C MFR BLD: 6 %
HCT VFR BLD AUTO: 53 % (ref 36.5–49.3)
HDLC SERPL-MCNC: 36 MG/DL
HGB BLD-MCNC: 17.2 G/DL (ref 12–17)
IMM GRANULOCYTES # BLD AUTO: 0.07 THOUSAND/UL (ref 0–0.2)
IMM GRANULOCYTES NFR BLD AUTO: 1 % (ref 0–2)
LDLC SERPL CALC-MCNC: 103 MG/DL (ref 0–100)
LYMPHOCYTES # BLD AUTO: 1.95 THOUSANDS/ΜL (ref 0.6–4.47)
LYMPHOCYTES NFR BLD AUTO: 21 % (ref 14–44)
MCH RBC QN AUTO: 29.4 PG (ref 26.8–34.3)
MCHC RBC AUTO-ENTMCNC: 32.5 G/DL (ref 31.4–37.4)
MCV RBC AUTO: 91 FL (ref 82–98)
MONOCYTES # BLD AUTO: 0.6 THOUSAND/ΜL (ref 0.17–1.22)
MONOCYTES NFR BLD AUTO: 7 % (ref 4–12)
NEUTROPHILS # BLD AUTO: 6.11 THOUSANDS/ΜL (ref 1.85–7.62)
NEUTS SEG NFR BLD AUTO: 66 % (ref 43–75)
NONHDLC SERPL-MCNC: 121 MG/DL
NRBC BLD AUTO-RTO: 0 /100 WBCS
PLATELET # BLD AUTO: 237 THOUSANDS/UL (ref 149–390)
PMV BLD AUTO: 10.9 FL (ref 8.9–12.7)
POTASSIUM SERPL-SCNC: 4.1 MMOL/L (ref 3.5–5.3)
PROT SERPL-MCNC: 7.4 G/DL (ref 6.4–8.2)
PSA SERPL-MCNC: 0.4 NG/ML (ref 0–4)
RBC # BLD AUTO: 5.85 MILLION/UL (ref 3.88–5.62)
SODIUM SERPL-SCNC: 140 MMOL/L (ref 136–145)
TRIGL SERPL-MCNC: 89 MG/DL
TSH SERPL DL<=0.05 MIU/L-ACNC: 3.16 UIU/ML (ref 0.45–4.5)
WBC # BLD AUTO: 9.12 THOUSAND/UL (ref 4.31–10.16)

## 2022-05-02 PROCEDURE — 84443 ASSAY THYROID STIM HORMONE: CPT

## 2022-05-02 PROCEDURE — 36415 COLL VENOUS BLD VENIPUNCTURE: CPT

## 2022-05-02 PROCEDURE — 83036 HEMOGLOBIN GLYCOSYLATED A1C: CPT

## 2022-05-02 PROCEDURE — 99214 OFFICE O/P EST MOD 30 MIN: CPT | Performed by: FAMILY MEDICINE

## 2022-05-02 PROCEDURE — 1123F ACP DISCUSS/DSCN MKR DOCD: CPT | Performed by: FAMILY MEDICINE

## 2022-05-02 PROCEDURE — 85025 COMPLETE CBC W/AUTO DIFF WBC: CPT

## 2022-05-02 PROCEDURE — 80053 COMPREHEN METABOLIC PANEL: CPT

## 2022-05-02 PROCEDURE — 80061 LIPID PANEL: CPT

## 2022-05-02 PROCEDURE — G0103 PSA SCREENING: HCPCS

## 2022-05-02 PROCEDURE — G0439 PPPS, SUBSEQ VISIT: HCPCS | Performed by: FAMILY MEDICINE

## 2022-05-02 RX ORDER — METOPROLOL TARTRATE 100 MG/1
100 TABLET ORAL 2 TIMES DAILY
Qty: 180 TABLET | Refills: 1 | Status: SHIPPED | OUTPATIENT
Start: 2022-05-02

## 2022-05-02 RX ORDER — AMLODIPINE BESYLATE AND BENAZEPRIL HYDROCHLORIDE 10; 40 MG/1; MG/1
1 CAPSULE ORAL DAILY
Qty: 90 CAPSULE | Refills: 1 | Status: SHIPPED | OUTPATIENT
Start: 2022-05-02

## 2022-05-02 NOTE — PROGRESS NOTES
Subjective:      Patient ID: Tony Duggan  is a 64 y o  male  64-year-old male presents with his wife for subsequent annual wellness visit and follow-up of chronic conditions including hypertension, osteoarthritis of the lumbar spine, hereditary spastic hemiplegia  Patient and his wife states that his walking has become considerably worse due to his spastic hemiplegia  Patient is ambulating with the assistance of 2 wheel walker  No significant pain  Patient is due for repeat labs  Overall does not get out very much because he does not need to  Past Medical History:   Diagnosis Date    Gait disturbance 11/6/2013    Lipoma     last assessed: 1/23/2015       Family History   Problem Relation Age of Onset    No Known Problems Mother     Stroke Family         ischemic       Past Surgical History:   Procedure Laterality Date    CERVICAL FUSION      vertebral        reports that he has quit smoking  His smokeless tobacco use includes chew  He reports that he does not drink alcohol and does not use drugs  Current Outpatient Medications:     amLODIPine-benazepril (LOTREL) 10-40 MG per capsule, Take 1 capsule by mouth daily, Disp: 90 capsule, Rfl: 1    aspirin 81 MG tablet, Take 1 tablet by mouth daily, Disp: , Rfl:     baclofen 10 mg tablet, TAKE 1 TABLET BY MOUTH THREE TIMES A DAY, Disp: 90 tablet, Rfl: 3    metoprolol tartrate (LOPRESSOR) 100 mg tablet, Take 1 tablet (100 mg total) by mouth 2 (two) times a day, Disp: 180 tablet, Rfl: 1    The following portions of the patient's history were reviewed and updated as appropriate: allergies, current medications, past family history, past medical history, past social history, past surgical history and problem list     Review of Systems   Constitutional: Negative  HENT: Negative  Eyes: Negative  Respiratory: Negative  Cardiovascular: Negative  Gastrointestinal: Negative  Endocrine: Negative  Genitourinary: Negative  Musculoskeletal: Positive for gait problem ( ambulates with the assistance of a walker)  Skin: Negative  Allergic/Immunologic: Negative  Neurological: Positive for weakness (Bilateral lower extremities)  Hematological: Negative  Psychiatric/Behavioral: Negative  All other systems reviewed and are negative  Objective:    /72   Pulse 56   Resp 16   Ht 6' 2" (1 88 m)   Wt 93 kg (205 lb)   SpO2 97%   BMI 26 32 kg/m²      Physical Exam  Vitals and nursing note reviewed  Constitutional:       General: He is not in acute distress  Appearance: Normal appearance  He is well-developed and normal weight  He is not ill-appearing  HENT:      Head: Normocephalic and atraumatic  Right Ear: Tympanic membrane, ear canal and external ear normal       Left Ear: Tympanic membrane, ear canal and external ear normal       Nose: Nose normal       Mouth/Throat:      Mouth: Mucous membranes are moist    Eyes:      Extraocular Movements: Extraocular movements intact  Conjunctiva/sclera: Conjunctivae normal       Pupils: Pupils are equal, round, and reactive to light  Cardiovascular:      Rate and Rhythm: Normal rate and regular rhythm  Pulses: Normal pulses  Heart sounds: Normal heart sounds  No murmur heard  Pulmonary:      Effort: Pulmonary effort is normal       Breath sounds: Normal breath sounds  Abdominal:      General: Abdomen is flat  Bowel sounds are normal       Palpations: Abdomen is soft  Musculoskeletal:         General: Normal range of motion  Cervical back: Normal range of motion and neck supple  Skin:     General: Skin is warm and dry  Findings: Rash ( seborrheic keratosis ease as well as actinic damage upper extremities, scalp) present  Comments: Large right anterior chest wall   Neurological:      General: No focal deficit present  Mental Status: He is alert and oriented to person, place, and time        Motor: Weakness (Bilateral lower extremities) present  Gait: Gait abnormal       Deep Tendon Reflexes: Reflexes abnormal ( hyperreflexia lower extremities)  Psychiatric:         Mood and Affect: Mood normal          Behavior: Behavior normal          Thought Content: Thought content normal          Judgment: Judgment normal            No results found for this or any previous visit (from the past 1008 hour(s))  Assessment/Plan:    Benign essential hypertension  Well controlled on amlodipine, benazepril and metoprolol  Continue same  Refills provided    Hereditary spastic paraplegia [facility])  Patient ambulates with the assistance walker and cane  No effective treatments for this condition  Patient has seen Neurology  Patient is comfortable at this time    Stage 3 chronic kidney disease, unspecified whether stage 3a or 3b CKD ([facility] [address] )  Lab Results   Component Value Date    EGFR 51 04/13/2021    EGFR 40 03/19/2020    EGFR 52 01/23/2019    CREATININE 1 33 (H) 04/13/2021    CREATININE 1 64 (H) 03/19/2020    CREATININE 1 32 (H) 01/23/2019     Patient will be having repeat creatinine performed  Mostly based upon age  Underlying hypertension  So long as his renal function remains stable then no intervention is necessary  Abnormal glucose  Check hemoglobin A1c  He likes to eat sweets  Hypercholesterolemia  Watch dietary intake fats and cholesterol  Due for repeat lipid panel    Medicare annual wellness visit, subsequent  Subsequent annual wellness visit completed    Prostate cancer screening  Due for repeat PSA  Check PSA  Stop checking PSA at age [de-identified]          Problem List Items Addressed This Visit        Cardiovascular and Mediastinum    Benign essential hypertension     Well controlled on amlodipine, benazepril and metoprolol  Continue same    Refills provided         Relevant Medications    amLODIPine-benazepril (LOTREL) 10-40 MG per capsule    metoprolol tartrate (LOPRESSOR) 100 mg tablet    Other Relevant Orders CBC and differential    TSH, 3rd generation with Free T4 reflex       Nervous and Auditory    Hereditary spastic paraplegia [facility])     Patient ambulates with the assistance walker and cane  No effective treatments for this condition  Patient has seen Neurology  Patient is comfortable at this time            Genitourinary    Stage 3 chronic kidney disease, unspecified whether stage 3a or 3b CKD ([facility] [address] )     Lab Results   Component Value Date    EGFR 51 04/13/2021    EGFR 40 03/19/2020    EGFR 52 01/23/2019    CREATININE 1 33 (H) 04/13/2021    CREATININE 1 64 (H) 03/19/2020    CREATININE 1 32 (H) 01/23/2019     Patient will be having repeat creatinine performed  Mostly based upon age  Underlying hypertension  So long as his renal function remains stable then no intervention is necessary  Other    Abnormal glucose     Check hemoglobin A1c  He likes to eat sweets  Relevant Orders    Hemoglobin A1C    Hypercholesterolemia     Watch dietary intake fats and cholesterol  Due for repeat lipid panel         Relevant Orders    Comprehensive metabolic panel    Lipid panel    Medicare annual wellness visit, subsequent - Primary     Subsequent annual wellness visit completed         Prostate cancer screening     Due for repeat PSA  Check PSA    Stop checking PSA at age [de-identified]         Relevant Orders    PSA, Total Screen

## 2022-05-02 NOTE — ASSESSMENT & PLAN NOTE
Patient ambulates with the assistance walker and cane  No effective treatments for this condition  Patient has seen Neurology    Patient is comfortable at this time

## 2022-05-02 NOTE — PROGRESS NOTES
Assessment and Plan:     Problem List Items Addressed This Visit        Cardiovascular and Mediastinum    Benign essential hypertension     Well controlled on amlodipine, benazepril and metoprolol  Continue same  Refills provided         Relevant Medications    amLODIPine-benazepril (LOTREL) 10-40 MG per capsule    metoprolol tartrate (LOPRESSOR) 100 mg tablet    Other Relevant Orders    CBC and differential    TSH, 3rd generation with Free T4 reflex       Nervous and Auditory    Hereditary spastic paraplegia (HCC)     Patient ambulates with the assistance walker and cane  No effective treatments for this condition  Patient has seen Neurology  Patient is comfortable at this time            Genitourinary    Stage 3 chronic kidney disease, unspecified whether stage 3a or 3b CKD ([facility] [address] )     Lab Results   Component Value Date    EGFR 51 04/13/2021    EGFR 40 03/19/2020    EGFR 52 01/23/2019    CREATININE 1 33 (H) 04/13/2021    CREATININE 1 64 (H) 03/19/2020    CREATININE 1 32 (H) 01/23/2019     Patient will be having repeat creatinine performed  Mostly based upon age  Underlying hypertension  So long as his renal function remains stable then no intervention is necessary  Other    Abnormal glucose     Check hemoglobin A1c  He likes to eat sweets  Relevant Orders    Hemoglobin A1C    Hypercholesterolemia     Watch dietary intake fats and cholesterol  Due for repeat lipid panel         Relevant Orders    Comprehensive metabolic panel    Lipid panel    Medicare annual wellness visit, subsequent - Primary     Subsequent annual wellness visit completed         Prostate cancer screening     Due for repeat PSA  Check PSA    Stop checking PSA at age [de-identified]         Relevant Orders    PSA, Total Screen         History of Present Illness:     Patient presents for Medicare Annual Wellness visit    Patient Care Team:  Supriya Ordaz DO as PCP - General  DO Yasir Whitfield MD Ronni Heinz, DO (Neurology)     Problem List:     Patient Active Problem List   Diagnosis    Peripheral neuropathy    Gait disturbance    Prostate cancer screening    Medicare annual wellness visit, subsequent    Abnormal glucose    Benign essential hypertension    Benign prostatic hyperplasia without urinary obstruction    Hypercholesterolemia    Need for shingles vaccine    [facility]    Colon cancer screening    Actinic keratoses    Skin cancer screening    Stage 3 chronic kidney disease, unspecified whether stage 3a or 3b CKD ([facility] [address] )      Past Medical and Surgical History:     Past Medical History:   Diagnosis Date    Gait disturbance 11/6/2013    Lipoma     last assessed: 1/23/2015     Past Surgical History:   Procedure Laterality Date    CERVICAL FUSION      vertebral      Family History:     Family History   Problem Relation Age of Onset    No Known Problems Mother     Stroke Family         ischemic      Social History:     Social History     Tobacco Use   Smoking Status Former Smoker   Smokeless Tobacco Current User    Types: Chew     Social History     Substance and Sexual Activity   Alcohol Use Never     Social History     Substance and Sexual Activity   Drug Use Never      Medications and Allergies:     Current Outpatient Medications   Medication Sig Dispense Refill    amLODIPine-benazepril (LOTREL) 10-40 MG per capsule Take 1 capsule by mouth daily 90 capsule 1    aspirin 81 MG tablet Take 1 tablet by mouth daily      baclofen 10 mg tablet TAKE 1 TABLET BY MOUTH THREE TIMES A DAY 90 tablet 3    metoprolol tartrate (LOPRESSOR) 100 mg tablet Take 1 tablet (100 mg total) by mouth 2 (two) times a day 180 tablet 1     No current facility-administered medications for this visit       Allergies   Allergen Reactions    Ibuprofen      Reaction Date: [id number];       Immunizations:     Immunization History   Administered Date(s) Administered    COVID-19 PFIZER VACCINE 0 3 ML IM 03/11/2021, 04/02/2021    Influenza Split High Dose Preservative Free IM 11/06/2013, 10/14/2014, 11/11/2015, 11/22/2016, 12/07/2017, 11/15/2019    Influenza, seasonal, injectable 09/30/2010, 09/07/2011    Pneumococcal Conjugate 13-Valent 05/18/2016    Pneumococcal Polysaccharide PPV23 04/29/2010      Medicare Screening Tests and Risk Assessments:     Kaylyn Jj is here for his Subsequent Wellness visit  Last Medicare Wellness visit information reviewed, patient interviewed, no change since last AWV  Health Risk Assessment:   Patient rates overall health as good  Patient feels that their physical health rating is Slightly better  Patient is satisfied with their life  Eyesight was rated as Same  Hearing was rated as Same  Patient feels that their emotional and mental health rating is Slightly better  Patients states they are never, rarely angry  Patient states they are sometimes unusually tired/fatigued  Pain experienced in the last 7 days has been Some  Patient states that he has experienced no weight loss or gain in last 6 months  Depression Screening:   PHQ-2 Score: 2      Fall Risk Screening: In the past year, patient has experienced: no history of falling in past year      Home Safety:  Patient has trouble with stairs inside or outside of their home  Patient has working smoke alarms and has no working carbon monoxide detector  Home safety hazards include: none  Nutrition:   Current diet is Regular  Medications:   Patient is currently taking over-the-counter supplements  OTC medications include: see medication list  Patient is able to manage medications  Activities of Daily Living (ADLs)/Instrumental Activities of Daily Living (IADLs):   Walk and transfer into and out of bed and chair?: Yes  Dress and groom yourself?: Yes    Bathe or shower yourself?: Yes    Feed yourself?  Yes  Do your laundry/housekeeping?: Yes  Manage your money, pay your bills and track your expenses?: Yes  Make your own meals?: Yes    Do your own shopping?: Yes    Previous Hospitalizations:   Any hospitalizations or ED visits within the last 12 months?: No      Advance Care Planning:   Living will: No    Durable POA for healthcare: No    Advanced directive: No    Advanced directive counseling given: Yes    Five wishes given: Yes    Patient declined ACP directive: No    End of Life Decisions reviewed with patient: No    Provider agrees with end of life decisions: No      PREVENTIVE SCREENINGS      Cardiovascular Screening:    General: Screening Not Indicated and History Lipid Disorder      Diabetes Screening:     General: Screening Current      Colorectal Cancer Screening:     General: Risks and Benefits Discussed    Due for: Fecal Occult Blood      Prostate Cancer Screening:    General: Screening Not Indicated      Osteoporosis Screening:    General: Screening Current      Abdominal Aortic Aneurysm (AAA) Screening:    Risk factors include: tobacco use        General: Screening Current      Lung Cancer Screening:     General: Screening Not Indicated      Hepatitis C Screening:    General: Screening Current    Screening, Brief Intervention, and Referral to Treatment (SBIRT)    Screening  Typical number of drinks in a day: 0  Typical number of drinks in a week: 0  Interpretation: Low risk drinking behavior      AUDIT-C Screenin) How often did you have a drink containing alcohol in the past year? never  2) How many drinks did you have on a typical day when you were drinking in the past year? 0  3) How often did you have 6 or more drinks on one occasion in the past year? never    AUDIT-C Score: 0  Interpretation: Score 0-3 (male): Negative screen for alcohol misuse    Single Item Drug Screening:  How often have you used an illegal drug (including marijuana) or a prescription medication for non-medical reasons in the past year? never    Single Item Drug Screen Score: 0  Interpretation: Negative screen for possible drug use disorder      BMI Counseling: There is no height or weight on file to calculate BMI  The BMI is above normal  Nutrition recommendations include reducing intake of saturated and trans fat and reducing intake of cholesterol  Exercise recommendations include strength training exercises  Depression Screening and Follow-up Plan: Patient was screened for depression during today's encounter  They screened negative with a PHQ-2 score of 2  Tobacco Cessation Counseling: Tobacco cessation counseling was not provided   The patient is sincerely urged to quit consumption of tobacco  He is not ready to quit tobacco

## 2022-05-02 NOTE — RESULT ENCOUNTER NOTE
Please call patient and notify that results are essentially normal  No changes in medications  Impaired fasting glucose however his hemoglobin A1c remains at 6 0%  Prediabetic range  Not an issue  No changes     re-evaluate in 1 year

## 2022-05-02 NOTE — ASSESSMENT & PLAN NOTE
Lab Results   Component Value Date    EGFR 51 04/13/2021    EGFR 40 03/19/2020    EGFR 52 01/23/2019    CREATININE 1 33 (H) 04/13/2021    CREATININE 1 64 (H) 03/19/2020    CREATININE 1 32 (H) 01/23/2019     Patient will be having repeat creatinine performed  Mostly based upon age  Underlying hypertension  So long as his renal function remains stable then no intervention is necessary

## 2022-05-03 ENCOUNTER — TELEPHONE (OUTPATIENT)
Dept: FAMILY MEDICINE CLINIC | Facility: CLINIC | Age: 77
End: 2022-05-03

## 2022-05-03 NOTE — TELEPHONE ENCOUNTER
----- Message from Travon Ramirez DO sent at 5/2/2022  6:41 PM EDT -----  Please call patient and notify that results are essentially normal  No changes in medications  Impaired fasting glucose however his hemoglobin A1c remains at 6 0%  Prediabetic range  Not an issue  No changes     re-evaluate in 1 year

## 2022-09-03 DIAGNOSIS — G11.4 HEREDITARY SPASTIC PARAPLEGIA (HCC): ICD-10-CM

## 2022-09-06 RX ORDER — BACLOFEN 10 MG/1
TABLET ORAL
Qty: 270 TABLET | Refills: 1 | Status: SHIPPED | OUTPATIENT
Start: 2022-09-06

## 2022-10-12 PROBLEM — Z12.83 SKIN CANCER SCREENING: Status: RESOLVED | Noted: 2021-04-20 | Resolved: 2022-10-12

## 2023-03-21 ENCOUNTER — OFFICE VISIT (OUTPATIENT)
Dept: FAMILY MEDICINE CLINIC | Facility: CLINIC | Age: 78
End: 2023-03-21

## 2023-03-21 VITALS
TEMPERATURE: 97.8 F | WEIGHT: 202 LBS | DIASTOLIC BLOOD PRESSURE: 64 MMHG | HEIGHT: 74 IN | BODY MASS INDEX: 25.93 KG/M2 | HEART RATE: 68 BPM | SYSTOLIC BLOOD PRESSURE: 122 MMHG | OXYGEN SATURATION: 98 % | RESPIRATION RATE: 16 BRPM

## 2023-03-21 DIAGNOSIS — L02.212 ABSCESS OF BACK: Primary | ICD-10-CM

## 2023-03-21 RX ORDER — SULFAMETHOXAZOLE AND TRIMETHOPRIM 800; 160 MG/1; MG/1
1 TABLET ORAL EVERY 12 HOURS SCHEDULED
Qty: 20 TABLET | Refills: 0 | Status: SHIPPED | OUTPATIENT
Start: 2023-03-21 | End: 2023-03-27 | Stop reason: SDUPTHER

## 2023-03-21 NOTE — PROGRESS NOTES
Subjective:      Patient ID: Althea Mackenzie  is a 76 y o  male  68-year-old male presents with his wife and sister-in-law for evaluation of abscess on right upper back  Not certain how long it has been present  No drainage  No fever or chills  Similar episode 6 years ago      Past Medical History:   Diagnosis Date   • Gait disturbance 11/6/2013   • Lipoma     last assessed: 1/23/2015       Family History   Problem Relation Age of Onset   • No Known Problems Mother    • Stroke Family         ischemic       Past Surgical History:   Procedure Laterality Date   • CERVICAL FUSION      vertebral        reports that he has quit smoking  His smokeless tobacco use includes chew  He reports that he does not drink alcohol and does not use drugs  Current Outpatient Medications:   •  amLODIPine-benazepril (LOTREL) 10-40 MG per capsule, Take 1 capsule by mouth daily, Disp: 90 capsule, Rfl: 1  •  aspirin 81 MG tablet, Take 1 tablet by mouth daily, Disp: , Rfl:   •  baclofen 10 mg tablet, TAKE 1 TABLET BY MOUTH THREE TIMES A DAY, Disp: 270 tablet, Rfl: 1  •  metoprolol tartrate (LOPRESSOR) 100 mg tablet, Take 1 tablet (100 mg total) by mouth 2 (two) times a day, Disp: 180 tablet, Rfl: 1  •  sulfamethoxazole-trimethoprim (BACTRIM DS) 800-160 mg per tablet, Take 1 tablet by mouth every 12 (twelve) hours for 10 days, Disp: 20 tablet, Rfl: 0    The following portions of the patient's history were reviewed and updated as appropriate: allergies, current medications, past family history, past medical history, past social history, past surgical history and problem list     Review of Systems        Objective:    /64   Pulse 68   Temp 97 8 °F (36 6 °C) (Tympanic)   Resp 16   Ht 6' 2" (1 88 m)   Wt 91 6 kg (202 lb)   SpO2 98%   BMI 25 94 kg/m²      Physical Exam  Vitals and nursing note reviewed  Constitutional:       Appearance: Normal appearance     Skin:     Comments: 6 cm raised, erythematous abscess on the right upper back   Neurological:      Mental Status: He is alert           Media Information  Document Information    Clinical Image - Mobile Device   Abscess right side   03/21/2023 11:50 AM   Attached To: Office Visit on 3/21/23 with Ben Oh DO     Source Information    Ben Oh DO  [facility]         No results found for this or any previous visit (from the past 1008 hour(s))  Assessment/Plan:    Incision and Drainage    Date/Time: 3/21/2023 12:20 PM  Performed by: Ben Oh DO  Authorized by: Ben Oh DO   Universal Protocol:  Consent: Verbal consent obtained  Risks and benefits: risks, benefits and alternatives were discussed  Consent given by: patient and spouse  Patient understanding: patient states understanding of the procedure being performed      Patient location:  Clinic  Location:     Type:  Abscess    Location:  Trunk    Trunk location:  Back  Anesthesia (see MAR for exact dosages): Anesthesia method:  Topical application    Topical anesthesia: Ethyl chloride spray  Procedure details:     Complexity:  Simple    Incision types:  Stab incision    Scalpel blade:  11    Approach:  Puncture    Incision depth:  Subcutaneous    Wound management:  Probed and deloculated    Drainage:  Bloody    Drainage amount:  Scant    Wound treatment:  Packing placed    Packing materials:  1/4 in iodoform gauze    Amount 1/4" iodoform:  1  Post-procedure details:     Patient tolerance of procedure:   Tolerated well, no immediate complications      Abscess of back  - Incision and drainage performed as per procedure note    -Packing was placed     -Will be placed on Bactrim DS for 10 days    -Patient will return in 2 to 3 days for packing change and reevaluation          Problem List Items Addressed This Visit        Other    Abscess of back - Primary     - Incision and drainage performed as per procedure note    -Packing was placed     -Will be placed on Bactrim DS for 10 days    -Patient will return in 2 to 3 days for packing change and reevaluation         Relevant Medications    sulfamethoxazole-trimethoprim (BACTRIM DS) 800-160 mg per tablet    Other Relevant Orders    Incision and Drainage (Completed)

## 2023-03-21 NOTE — ASSESSMENT & PLAN NOTE
- Incision and drainage performed as per procedure note    -Packing was placed     -Will be placed on Bactrim DS for 10 days    -Patient will return in 2 to 3 days for packing change and reevaluation

## 2023-03-24 ENCOUNTER — OFFICE VISIT (OUTPATIENT)
Dept: FAMILY MEDICINE CLINIC | Facility: CLINIC | Age: 78
End: 2023-03-24

## 2023-03-24 VITALS — TEMPERATURE: 97.4 F

## 2023-03-24 DIAGNOSIS — L02.212 ABSCESS OF BACK: Primary | ICD-10-CM

## 2023-03-24 NOTE — ASSESSMENT & PLAN NOTE
Incision again performed and packing placed  Mostly bloody drainage    Patient will continue on Bactrim DS and will return in 3 to 4 days for packing change and reevaluation

## 2023-03-27 ENCOUNTER — OFFICE VISIT (OUTPATIENT)
Dept: FAMILY MEDICINE CLINIC | Facility: CLINIC | Age: 78
End: 2023-03-27

## 2023-03-27 VITALS — BODY MASS INDEX: 25.94 KG/M2 | WEIGHT: 202 LBS

## 2023-03-27 DIAGNOSIS — L02.212 ABSCESS OF BACK: ICD-10-CM

## 2023-03-27 RX ORDER — SULFAMETHOXAZOLE AND TRIMETHOPRIM 800; 160 MG/1; MG/1
1 TABLET ORAL EVERY 12 HOURS SCHEDULED
Qty: 10 TABLET | Refills: 0 | Status: SHIPPED | OUTPATIENT
Start: 2023-03-27 | End: 2023-04-01

## 2023-03-27 NOTE — ASSESSMENT & PLAN NOTE
- Packing removed    -Wound will heal by secondary intention    -Extend course of antibiotics for an additional 5 days    -Reevaluate in 1 week if needed

## 2023-03-27 NOTE — PROGRESS NOTES
Subjective:      Patient ID: Mark Gilliam  is a 76 y o  male  Patient presents with his wife for packing change and reevaluation of abscess status post I&D  No complaints  Does not feel as sore      Past Medical History:   Diagnosis Date   • Gait disturbance 11/6/2013   • Lipoma     last assessed: 1/23/2015       Family History   Problem Relation Age of Onset   • No Known Problems Mother    • Stroke Family         ischemic       Past Surgical History:   Procedure Laterality Date   • CERVICAL FUSION      vertebral        reports that he has quit smoking  His smokeless tobacco use includes chew  He reports that he does not drink alcohol and does not use drugs  Current Outpatient Medications:   •  amLODIPine-benazepril (LOTREL) 10-40 MG per capsule, Take 1 capsule by mouth daily, Disp: 90 capsule, Rfl: 1  •  aspirin 81 MG tablet, Take 1 tablet by mouth daily, Disp: , Rfl:   •  baclofen 10 mg tablet, TAKE 1 TABLET BY MOUTH THREE TIMES A DAY, Disp: 270 tablet, Rfl: 1  •  metoprolol tartrate (LOPRESSOR) 100 mg tablet, Take 1 tablet (100 mg total) by mouth 2 (two) times a day, Disp: 180 tablet, Rfl: 1  •  sulfamethoxazole-trimethoprim (BACTRIM DS) 800-160 mg per tablet, Take 1 tablet by mouth every 12 (twelve) hours for 5 days, Disp: 10 tablet, Rfl: 0    The following portions of the patient's history were reviewed and updated as appropriate: allergies, current medications, past family history, past medical history, past social history, past surgical history and problem list     Review of Systems        Objective: Wt 91 6 kg (202 lb)   BMI 25 94 kg/m²      Physical Exam  Vitals and nursing note reviewed  Skin:     Comments: Area of I&D and abscess definitely appears to be healing well  Packing removed  Not as erythematous or raised            Media Information  Document Information    Clinical Image - Mobile Device   Abscess residual   03/27/2023 11:48 AM   Attached To:    Office Visit on 3/27/23 with Geovanny Carreon DO     Source Information    Geovanny Carreon DO  [facility]       No results found for this or any previous visit (from the past 1008 hour(s))      Assessment/Plan:    Abscess of back  - Packing removed    -Wound will heal by secondary intention    -Extend course of antibiotics for an additional 5 days    -Reevaluate in 1 week if needed          Problem List Items Addressed This Visit        Other    Abscess of back     - Packing removed    -Wound will heal by secondary intention    -Extend course of antibiotics for an additional 5 days    -Reevaluate in 1 week if needed         Relevant Medications    sulfamethoxazole-trimethoprim (BACTRIM DS) 800-160 mg per tablet

## 2023-05-19 ENCOUNTER — RA CDI HCC (OUTPATIENT)
Dept: OTHER | Facility: HOSPITAL | Age: 78
End: 2023-05-19

## 2023-05-19 NOTE — PROGRESS NOTES
[facility] [address]  coding opportunities       Chart reviewed, no opportunity found: CHART REVIEWED, NO OPPORTUNITY FOUND        Patients Insurance     Medicare Insurance: Medicare

## 2023-05-26 ENCOUNTER — OFFICE VISIT (OUTPATIENT)
Dept: FAMILY MEDICINE CLINIC | Facility: CLINIC | Age: 78
End: 2023-05-26

## 2023-05-26 VITALS
OXYGEN SATURATION: 98 % | HEIGHT: 74 IN | RESPIRATION RATE: 16 BRPM | HEART RATE: 70 BPM | BODY MASS INDEX: 25.93 KG/M2 | DIASTOLIC BLOOD PRESSURE: 72 MMHG | SYSTOLIC BLOOD PRESSURE: 138 MMHG | WEIGHT: 202 LBS

## 2023-05-26 DIAGNOSIS — R73.09 ABNORMAL GLUCOSE: ICD-10-CM

## 2023-05-26 DIAGNOSIS — Z00.00 MEDICARE ANNUAL WELLNESS VISIT, SUBSEQUENT: Primary | ICD-10-CM

## 2023-05-26 DIAGNOSIS — I10 BENIGN ESSENTIAL HYPERTENSION: ICD-10-CM

## 2023-05-26 DIAGNOSIS — N18.30 STAGE 3 CHRONIC KIDNEY DISEASE, UNSPECIFIED WHETHER STAGE 3A OR 3B CKD (HCC): ICD-10-CM

## 2023-05-26 DIAGNOSIS — E78.00 HYPERCHOLESTEROLEMIA: ICD-10-CM

## 2023-05-26 DIAGNOSIS — G11.4 HEREDITARY SPASTIC PARAPLEGIA (HCC): ICD-10-CM

## 2023-05-26 RX ORDER — METOPROLOL TARTRATE 100 MG/1
100 TABLET ORAL 2 TIMES DAILY
Qty: 180 TABLET | Refills: 1 | Status: SHIPPED | OUTPATIENT
Start: 2023-05-26

## 2023-05-26 RX ORDER — AMLODIPINE BESYLATE AND BENAZEPRIL HYDROCHLORIDE 10; 40 MG/1; MG/1
1 CAPSULE ORAL DAILY
Qty: 90 CAPSULE | Refills: 1 | Status: SHIPPED | OUTPATIENT
Start: 2023-05-26

## 2023-05-26 NOTE — ASSESSMENT & PLAN NOTE
Very well controlled on current medications including metoprolol, amlodipine/benazepril  Continue same    Reevaluate in 6 months

## 2023-05-26 NOTE — PROGRESS NOTES
Assessment and Plan:     Problem List Items Addressed This Visit        Cardiovascular and Mediastinum    Benign essential hypertension     Very well controlled on current medications including metoprolol, amlodipine/benazepril  Continue same  Reevaluate in 6 months         Relevant Medications    amLODIPine-benazepril (LOTREL) 10-40 MG per capsule    metoprolol tartrate (LOPRESSOR) 100 mg tablet    Other Relevant Orders    CBC and differential    TSH, 3rd generation with Free T4 reflex       Nervous and Auditory    Hereditary spastic paraplegia (HCC)     Ambulates with the assistance of a walker  Has seen neurology  No treatments for this condition            Genitourinary    Stage 3 chronic kidney disease, unspecified whether stage 3a or 3b CKD ([facility] )     Lab Results   Component Value Date    CREATININE 1 26 05/02/2022    CREATININE 1 33 (H) 04/13/2021    CREATININE 1 64 (H) 03/19/2020    EGFR 54 05/02/2022    EGFR 51 04/13/2021    EGFR 40 03/19/2020     Renal functions have been holding stable  Repeat creatinine will be due in 6 months         Relevant Orders    TSH, 3rd generation with Free T4 reflex       Other    Abnormal glucose     Check hemoglobin A1c in 6 months         Relevant Orders    Hemoglobin A1C    Hypercholesterolemia     Patient will be due for repeat lipid panel in November  At that point he will be 78           Relevant Orders    Comprehensive metabolic panel    Lipid panel    Medicare annual wellness visit, subsequent - Primary     Subsequent annual wellness visit performed           History of Present Illness:     Patient presents for Medicare Annual Wellness visit    Patient Care Team:  Matheus Fang DO as PCP - General  DO London Carlisle MD Bonnetta Rend, DO (Neurology)     Problem List:     Patient Active Problem List   Diagnosis   • Peripheral neuropathy   • Gait disturbance   • Prostate cancer screening   • Medicare annual wellness visit, subsequent   • Abnormal glucose   • Benign essential hypertension   • Benign prostatic hyperplasia without urinary obstruction   • Hypercholesterolemia   • Need for shingles vaccine   • Hereditary spastic paraplegia (HCC)   • Colon cancer screening   • Actinic keratoses   • Stage 3 chronic kidney disease, unspecified whether stage 3a or 3b CKD (HCC)   • Abscess of back      Past Medical and Surgical History:     Past Medical History:   Diagnosis Date   • Gait disturbance 11/6/2013   • Lipoma     last assessed: 1/23/2015     Past Surgical History:   Procedure Laterality Date   • CERVICAL FUSION      vertebral      Family History:     Family History   Problem Relation Age of Onset   • No Known Problems Mother    • Stroke Family         ischemic      Social History:     Social History     Tobacco Use   Smoking Status Former   Smokeless Tobacco Current   • Types: Chew     Social History     Substance and Sexual Activity   Alcohol Use Never     Social History     Substance and Sexual Activity   Drug Use Never      Medications and Allergies:     Current Outpatient Medications   Medication Sig Dispense Refill   • amLODIPine-benazepril (LOTREL) 10-40 MG per capsule Take 1 capsule by mouth daily 90 capsule 1   • aspirin 81 MG tablet Take 1 tablet by mouth daily     • baclofen 10 mg tablet TAKE 1 TABLET BY MOUTH THREE TIMES A  tablet 1   • metoprolol tartrate (LOPRESSOR) 100 mg tablet Take 1 tablet (100 mg total) by mouth 2 (two) times a day 180 tablet 1     No current facility-administered medications for this visit       Allergies   Allergen Reactions   • Ibuprofen      Reaction Date: [id number];       Immunizations:     Immunization History   Administered Date(s) Administered   • COVID-19 PFIZER VACCINE 0 3 ML IM 03/11/2021, 04/02/2021   • Influenza Split High Dose Preservative Free IM 11/06/2013, 10/14/2014, 11/11/2015, 11/22/2016, 12/07/2017, 11/15/2019   • Influenza, seasonal, injectable 09/30/2010, 09/07/2011   • Pneumococcal Conjugate 13-Valent 05/18/2016   • Pneumococcal Polysaccharide PPV23 04/29/2010      Medicare Screening Tests and Risk Assessments:     Racheal Goldman is here for his Subsequent Wellness visit  Last Medicare Wellness visit information reviewed, patient interviewed, no change since last AWV  Health Risk Assessment:   Patient rates overall health as good  Patient feels that their physical health rating is Slightly better  Patient is satisfied with their life  Eyesight was rated as Same  Hearing was rated as Same  Patient feels that their emotional and mental health rating is Slightly better  Patients states they are never, rarely angry  Patient states they are sometimes unusually tired/fatigued  Pain experienced in the last 7 days has been Some  Patient states that he has experienced no weight loss or gain in last 6 months  Fall Risk Screening: In the past year, patient has experienced: no history of falling in past year      Home Safety:  Patient has trouble with stairs inside or outside of their home  Patient has working smoke alarms and has no working carbon monoxide detector  Home safety hazards include: none  Nutrition:   Current diet is Regular  Medications:   Patient is currently taking over-the-counter supplements  OTC medications include: see medication list  Patient is able to manage medications  Activities of Daily Living (ADLs)/Instrumental Activities of Daily Living (IADLs):   Walk and transfer into and out of bed and chair?: Yes  Dress and groom yourself?: Yes    Bathe or shower yourself?: Yes    Feed yourself?  Yes  Do your laundry/housekeeping?: Yes  Manage your money, pay your bills and track your expenses?: Yes  Make your own meals?: Yes    Do your own shopping?: Yes    Previous Hospitalizations:   Any hospitalizations or ED visits within the last 12 months?: No      Advance Care Planning:   Living will: No    Durable POA for healthcare: No    Advanced directive: No    Advanced directive counseling given: Yes    Five wishes given: Yes    Patient declined ACP directive: No    End of Life Decisions reviewed with patient: No    Provider agrees with end of life decisions: No      PREVENTIVE SCREENINGS      Cardiovascular Screening:    General: Screening Not Indicated and History Lipid Disorder      Diabetes Screening:     General: Screening Current      Colorectal Cancer Screening:     General: Risks and Benefits Discussed    Due for: Fecal Occult Blood      Prostate Cancer Screening:    General: Screening Not Indicated      Osteoporosis Screening:    General: Screening Current      Abdominal Aortic Aneurysm (AAA) Screening:    Risk factors include: tobacco use        General: Screening Current      Lung Cancer Screening:     General: Screening Not Indicated      Hepatitis C Screening:    General: Screening Current    Screening, Brief Intervention, and Referral to Treatment (SBIRT)    Screening  Typical number of drinks in a day: 0  Typical number of drinks in a week: 0  Interpretation: Low risk drinking behavior  AUDIT-C Screenin) How often did you have a drink containing alcohol in the past year? never  2) How many drinks did you have on a typical day when you were drinking in the past year? 0  3) How often did you have 6 or more drinks on one occasion in the past year? never    AUDIT-C Score: 0  Interpretation: Score 0-3 (male): Negative screen for alcohol misuse    Single Item Drug Screening:  How often have you used an illegal drug (including marijuana) or a prescription medication for non-medical reasons in the past year? never    Single Item Drug Screen Score: 0  Interpretation: Negative screen for possible drug use disorder      BMI Counseling: There is no height or weight on file to calculate BMI  The BMI is above normal  Nutrition recommendations include reducing intake of saturated and trans fat and reducing intake of cholesterol  Exercise recommendations include strength training exercises  Tobacco Cessation Counseling: Tobacco cessation counseling was not provided  The patient is sincerely urged to quit consumption of tobacco  He is not ready to quit tobacco  Medication options and side effects of medication not discussed  Patient refused medication  HPI      Review of Systems   Constitutional: Negative  HENT: Negative  Eyes: Negative  Respiratory: Negative  Cardiovascular: Negative  Gastrointestinal: Negative  Endocrine: Negative  Genitourinary: Negative  Musculoskeletal: Positive for gait problem ( ambulates with the assistance of a walker)  Skin: Negative  Allergic/Immunologic: Negative  Neurological: Positive for weakness (Bilateral lower extremities)  Hematological: Negative  Psychiatric/Behavioral: Negative  All other systems reviewed and are negative  Physical Exam  Vitals and nursing note reviewed  Constitutional:       General: He is not in acute distress  Appearance: Normal appearance  He is well-developed and normal weight  He is not ill-appearing  HENT:      Head: Normocephalic  Eyes:      Conjunctiva/sclera: Conjunctivae normal       Pupils: Pupils are equal, round, and reactive to light  Cardiovascular:      Rate and Rhythm: Normal rate and regular rhythm  Heart sounds: Normal heart sounds  Pulmonary:      Effort: Pulmonary effort is normal       Breath sounds: Normal breath sounds  Abdominal:      General: Bowel sounds are normal       Palpations: Abdomen is soft  Musculoskeletal:         General: Normal range of motion  Cervical back: Normal range of motion and neck supple  Skin:     Comments: Large lipoma on the right anterior chest wall   Neurological:      Mental Status: He is alert and oriented to person, place, and time  Mental status is at baseline  Motor: Abnormal muscle tone present        Gait: Gait abnormal       Deep Tendon Reflexes: Reflexes abnormal       Comments: Significant gait dysfunction and spasticity   Psychiatric:         Behavior: Behavior normal          Thought Content:  Thought content normal          Judgment: Judgment normal

## 2023-05-26 NOTE — PATIENT INSTRUCTIONS
Medicare Preventive Visit Patient Instructions  Thank you for completing your Welcome to Medicare Visit or Medicare Annual Wellness Visit today  Your next wellness visit will be due in one year (5/26/2024)  The screening/preventive services that you may require over the next 5-10 years are detailed below  Some tests may not apply to you based off risk factors and/or age  Screening tests ordered at today's visit but not completed yet may show as past due  Also, please note that scanned in results may not display below  Preventive Screenings:  Service Recommendations Previous Testing/Comments   Colorectal Cancer Screening  · Colonoscopy    · Fecal Occult Blood Test (FOBT)/Fecal Immunochemical Test (FIT)  · Fecal DNA/Cologuard Test  · Flexible Sigmoidoscopy Age: 39-70 years old   Colonoscopy: every 10 years (May be performed more frequently if at higher risk)  OR  FOBT/FIT: every 1 year  OR  Cologuard: every 3 years  OR  Sigmoidoscopy: every 5 years  Screening may be recommended earlier than age 39 if at higher risk for colorectal cancer  Also, an individualized decision between you and your healthcare provider will decide whether screening between the ages of 74-80 would be appropriate   Colonoscopy: Not on file  FOBT/FIT: Not on file  Cologuard: Not on file  Sigmoidoscopy: Not on file    Risks and Benefits Discussed  Due for Fecal Occult Blood     Prostate Cancer Screening Individualized decision between patient and health care provider in men between ages of 53-78   Medicare will cover every 12 months beginning on the day after your 50th birthday PSA: 0 4 ng/mL     Screening Not Indicated     Hepatitis C Screening Once for adults born between 1945 and 1965  More frequently in patients at high risk for Hepatitis C Hep C Antibody: Not on file    Screening Current   Diabetes Screening 1-2 times per year if you're at risk for diabetes or have pre-diabetes Fasting glucose: 133 mg/dL (5/2/2022)  A1C: 6 0 % (5/2/2022)  Screening Current   Cholesterol Screening Once every 5 years if you don't have a lipid disorder  May order more often based on risk factors  Lipid panel: 05/02/2022  Screening Not Indicated  History Lipid Disorder      Other Preventive Screenings Covered by Medicare:  1  Abdominal Aortic Aneurysm (AAA) Screening: covered once if your at risk  You're considered to be at risk if you have a family history of AAA or a male between the age of 73-68 who smoking at least 100 cigarettes in your lifetime  2  Lung Cancer Screening: covers low dose CT scan once per year if you meet all of the following conditions: (1) Age 50-69; (2) No signs or symptoms of lung cancer; (3) Current smoker or have quit smoking within the last 15 years; (4) You have a tobacco smoking history of at least 20 pack years (packs per day x number of years you smoked); (5) You get a written order from a healthcare provider  3  Glaucoma Screening: covered annually if you're considered high risk: (1) You have diabetes OR (2) Family history of glaucoma OR (3)  aged 48 and older OR (3)  American aged 72 and older  3  Osteoporosis Screening: covered every 2 years if you meet one of the following conditions: (1) Have a vertebral abnormality; (2) On glucocorticoid therapy for more than 3 months; (3) Have primary hyperparathyroidism; (4) On osteoporosis medications and need to assess response to drug therapy  5  HIV Screening: covered annually if you're between the age of 12-76  Also covered annually if you are younger than 13 and older than 72 with risk factors for HIV infection  For pregnant patients, it is covered up to 3 times per pregnancy      Immunizations:  Immunization Recommendations   Influenza Vaccine Annual influenza vaccination during flu season is recommended for all persons aged >= 6 months who do not have contraindications   Pneumococcal Vaccine   * Pneumococcal conjugate vaccine = PCV13 (Prevnar 13), PCV15 (Vaxneuvance), PCV20 (Prevnar 20)  * Pneumococcal polysaccharide vaccine = PPSV23 (Pneumovax) Adults 2364 years old: 1-3 doses may be recommended based on certain risk factors  Adults 72 years old: 1-2 doses may be recommended based off what pneumonia vaccine you previously received   Hepatitis B Vaccine 3 dose series if at intermediate or high risk (ex: diabetes, end stage renal disease, liver disease)   Tetanus (Td) Vaccine - COST NOT COVERED BY MEDICARE PART B Following completion of primary series, a booster dose should be given every 10 years to maintain immunity against tetanus  Td may also be given as tetanus wound prophylaxis  Tdap Vaccine - COST NOT COVERED BY MEDICARE PART B Recommended at least once for all adults  For pregnant patients, recommended with each pregnancy  Shingles Vaccine (Shingrix) - COST NOT COVERED BY MEDICARE PART B  2 shot series recommended in those aged 48 and above     Health Maintenance Due:      Topic Date Due   • Hepatitis C Screening  06/02/2084 (Originally 1945)     Immunizations Due:      Topic Date Due   • COVID-19 Vaccine (3 - Pfizer series) 05/28/2021     Advance Directives   What are advance directives? Advance directives are legal documents that state your wishes and plans for medical care  These plans are made ahead of time in case you lose your ability to make decisions for yourself  Advance directives can apply to any medical decision, such as the treatments you want, and if you want to donate organs  What are the types of advance directives? There are many types of advance directives, and each state has rules about how to use them  You may choose a combination of any of the following:  · Living will: This is a written record of the treatment you want  You can also choose which treatments you do not want, which to limit, and which to stop at a certain time  This includes surgery, medicine, IV fluid, and tube feedings     · Durable power of  for [facility]): This is a written record that states who you want to make healthcare choices for you when you are unable to make them for yourself  This person, called a proxy, is usually a family member or a friend  You may choose more than 1 proxy  · Do not resuscitate (DNR) order:  A DNR order is used in case your heart stops beating or you stop breathing  It is a request not to have certain forms of treatment, such as CPR  A DNR order may be included in other types of advance directives  · Medical directive: This covers the care that you want if you are in a coma, near death, or unable to make decisions for yourself  You can list the treatments you want for each condition  Treatment may include pain medicine, surgery, blood transfusions, dialysis, IV or tube feedings, and a ventilator (breathing machine)  · Values history: This document has questions about your views, beliefs, and how you feel and think about life  This information can help others choose the care that you would choose  Why are advance directives important? An advance directive helps you control your care  Although spoken wishes may be used, it is better to have your wishes written down  Spoken wishes can be misunderstood, or not followed  Treatments may be given even if you do not want them  An advance directive may make it easier for your family to make difficult choices about your care  How to Quit Using Smokeless Tobacco   Why it is important to stop using smokeless tobacco:  Smokeless tobacco comes in many forms  Examples include chew, snuff, dip, dissolvable tobacco, and snus  All smokeless tobacco products contain nicotine and may contain as much nicotine as 3 cigarettes  You may be physically dependent on nicotine  You may also be emotionally addicted to it  The cravings can be strong, but it is important to quit using smokeless tobacco  You will improve your health and decrease your cancer, stroke, and heart attack risk  Mouth sores and tooth problems will also improve when you quit  You can benefit from quitting no matter how long you have used smokeless tobacco    Prepare to stop using smokeless tobacco:  Nicotine is a highly addictive drug  Withdrawal symptoms can happen when you stop and make it hard to quit  The following can help keep you on track:  · Set a quit date  · Tell friends, family, and coworkers that you plan to quit  · Remove all smokeless tobacco products from your home, car, and workplace  Manage weight gain after you quit:  Nicotine can affect your metabolism  You may gain a few pounds after you quit  The following can help you control your weight:  · Eat healthy foods  · Drink water before, during, and between meals  · Exercise as directed  Weight Management   Why it is important to manage your weight:  Being overweight increases your risk of health conditions such as heart disease, high blood pressure, type 2 diabetes, and certain types of cancer  It can also increase your risk for osteoarthritis, sleep apnea, and other respiratory problems  Aim for a slow, steady weight loss  Even a small amount of weight loss can lower your risk of health problems  How to lose weight safely:  A safe and healthy way to lose weight is to eat fewer calories and get regular exercise  You can lose up about 1 pound a week by decreasing the number of calories you eat by 500 calories each day  Healthy meal plan for weight management:  A healthy meal plan includes a variety of foods, contains fewer calories, and helps you stay healthy  A healthy meal plan includes the following:  · Eat whole-grain foods more often  A healthy meal plan should contain fiber  Fiber is the part of grains, fruits, and vegetables that is not broken down by your body  Whole-grain foods are healthy and provide extra fiber in your diet   Some examples of whole-grain foods are whole-wheat breads and pastas, oatmeal, brown rice, and bulgur  · Eat a variety of vegetables every day  Include dark, leafy greens such as spinach, kale, rebeka greens, and mustard greens  Eat yellow and orange vegetables such as carrots, sweet potatoes, and winter squash  · Eat a variety of fruits every day  Choose fresh or canned fruit (canned in its own juice or light syrup) instead of juice  Fruit juice has very little or no fiber  · Eat low-fat dairy foods  Drink fat-free (skim) milk or 1% milk  Eat fat-free yogurt and low-fat cottage cheese  Try low-fat cheeses such as mozzarella and other reduced-fat cheeses  · Choose meat and other protein foods that are low in fat  Choose beans or other legumes such as split peas or lentils  Choose fish, skinless poultry (chicken or turkey), or lean cuts of red meat (beef or pork)  Before you cook meat or poultry, cut off any visible fat  · Use less fat and oil  Try baking foods instead of frying them  Add less fat, such as margarine, sour cream, regular salad dressing and mayonnaise to foods  Eat fewer high-fat foods  Some examples of high-fat foods include french fries, doughnuts, ice cream, and cakes  · Eat fewer sweets  Limit foods and drinks that are high in sugar  This includes candy, cookies, regular soda, and sweetened drinks  Exercise:  Exercise at least 30 minutes per day on most days of the week  Some examples of exercise include walking, biking, dancing, and swimming  You can also fit in more physical activity by taking the stairs instead of the elevator or parking farther away from stores  Ask your healthcare provider about the best exercise plan for you  © Copyright [employer] 2018 Information is for End User's use only and may not be sold, redistributed or otherwise used for commercial purposes   All illustrations and images included in CareNotes® are the copyrighted property of [employer]  or [address] [place] [address] [employer]

## 2023-05-26 NOTE — ASSESSMENT & PLAN NOTE
Lab Results   Component Value Date    CREATININE 1 26 05/02/2022    CREATININE 1 33 (H) 04/13/2021    CREATININE 1 64 (H) 03/19/2020    EGFR 54 05/02/2022    EGFR 51 04/13/2021    EGFR 40 03/19/2020     Renal functions have been holding stable    Repeat creatinine will be due in 6 months

## 2023-11-24 ENCOUNTER — RA CDI HCC (OUTPATIENT)
Dept: OTHER | Facility: HOSPITAL | Age: 78
End: 2023-11-24

## 2023-12-01 ENCOUNTER — APPOINTMENT (OUTPATIENT)
Dept: LAB | Facility: CLINIC | Age: 78
End: 2023-12-01
Payer: MEDICARE

## 2023-12-01 ENCOUNTER — OFFICE VISIT (OUTPATIENT)
Dept: FAMILY MEDICINE CLINIC | Facility: CLINIC | Age: 78
End: 2023-12-01
Payer: MEDICARE

## 2023-12-01 VITALS
HEIGHT: 74 IN | SYSTOLIC BLOOD PRESSURE: 124 MMHG | WEIGHT: 193 LBS | DIASTOLIC BLOOD PRESSURE: 70 MMHG | RESPIRATION RATE: 16 BRPM | OXYGEN SATURATION: 97 % | HEART RATE: 64 BPM | BODY MASS INDEX: 24.77 KG/M2

## 2023-12-01 DIAGNOSIS — E78.00 HYPERCHOLESTEROLEMIA: ICD-10-CM

## 2023-12-01 DIAGNOSIS — Z12.5 PROSTATE CANCER SCREENING: ICD-10-CM

## 2023-12-01 DIAGNOSIS — I10 BENIGN ESSENTIAL HYPERTENSION: Primary | ICD-10-CM

## 2023-12-01 DIAGNOSIS — I10 BENIGN ESSENTIAL HYPERTENSION: ICD-10-CM

## 2023-12-01 DIAGNOSIS — R73.09 ABNORMAL GLUCOSE: ICD-10-CM

## 2023-12-01 DIAGNOSIS — G11.4 HEREDITARY SPASTIC PARAPLEGIA (HCC): ICD-10-CM

## 2023-12-01 DIAGNOSIS — L72.3 SEBACEOUS CYST: ICD-10-CM

## 2023-12-01 DIAGNOSIS — Z23 ENCOUNTER FOR IMMUNIZATION: ICD-10-CM

## 2023-12-01 DIAGNOSIS — N18.30 STAGE 3 CHRONIC KIDNEY DISEASE, UNSPECIFIED WHETHER STAGE 3A OR 3B CKD (HCC): ICD-10-CM

## 2023-12-01 LAB
ALBUMIN SERPL BCP-MCNC: 4 G/DL (ref 3.5–5)
ALP SERPL-CCNC: 60 U/L (ref 34–104)
ALT SERPL W P-5'-P-CCNC: 21 U/L (ref 7–52)
ANION GAP SERPL CALCULATED.3IONS-SCNC: 9 MMOL/L
AST SERPL W P-5'-P-CCNC: 17 U/L (ref 13–39)
BASOPHILS # BLD AUTO: 0.03 THOUSANDS/ÂΜL (ref 0–0.1)
BASOPHILS NFR BLD AUTO: 1 % (ref 0–1)
BILIRUB SERPL-MCNC: 0.93 MG/DL (ref 0.2–1)
BUN SERPL-MCNC: 16 MG/DL (ref 5–25)
CALCIUM SERPL-MCNC: 9.7 MG/DL (ref 8.4–10.2)
CHLORIDE SERPL-SCNC: 106 MMOL/L (ref 96–108)
CHOLEST SERPL-MCNC: 143 MG/DL
CO2 SERPL-SCNC: 27 MMOL/L (ref 21–32)
CREAT SERPL-MCNC: 1.11 MG/DL (ref 0.6–1.3)
EOSINOPHIL # BLD AUTO: 0.13 THOUSAND/ÂΜL (ref 0–0.61)
EOSINOPHIL NFR BLD AUTO: 2 % (ref 0–6)
ERYTHROCYTE [DISTWIDTH] IN BLOOD BY AUTOMATED COUNT: 13.2 % (ref 11.6–15.1)
EST. AVERAGE GLUCOSE BLD GHB EST-MCNC: 134 MG/DL
GFR SERPL CREATININE-BSD FRML MDRD: 62 ML/MIN/1.73SQ M
GLUCOSE P FAST SERPL-MCNC: 127 MG/DL (ref 65–99)
HBA1C MFR BLD: 6.3 %
HCT VFR BLD AUTO: 47.1 % (ref 36.5–49.3)
HDLC SERPL-MCNC: 34 MG/DL
HGB BLD-MCNC: 15.8 G/DL (ref 12–17)
IMM GRANULOCYTES # BLD AUTO: 0.04 THOUSAND/UL (ref 0–0.2)
IMM GRANULOCYTES NFR BLD AUTO: 1 % (ref 0–2)
LDLC SERPL CALC-MCNC: 93 MG/DL (ref 0–100)
LYMPHOCYTES # BLD AUTO: 1.52 THOUSANDS/ÂΜL (ref 0.6–4.47)
LYMPHOCYTES NFR BLD AUTO: 23 % (ref 14–44)
MCH RBC QN AUTO: 29.9 PG (ref 26.8–34.3)
MCHC RBC AUTO-ENTMCNC: 33.5 G/DL (ref 31.4–37.4)
MCV RBC AUTO: 89 FL (ref 82–98)
MONOCYTES # BLD AUTO: 0.47 THOUSAND/ÂΜL (ref 0.17–1.22)
MONOCYTES NFR BLD AUTO: 7 % (ref 4–12)
NEUTROPHILS # BLD AUTO: 4.33 THOUSANDS/ÂΜL (ref 1.85–7.62)
NEUTS SEG NFR BLD AUTO: 66 % (ref 43–75)
NONHDLC SERPL-MCNC: 109 MG/DL
NRBC BLD AUTO-RTO: 0 /100 WBCS
PLATELET # BLD AUTO: 249 THOUSANDS/UL (ref 149–390)
PMV BLD AUTO: 10.8 FL (ref 8.9–12.7)
POTASSIUM SERPL-SCNC: 3.7 MMOL/L (ref 3.5–5.3)
PROT SERPL-MCNC: 6.6 G/DL (ref 6.4–8.4)
RBC # BLD AUTO: 5.28 MILLION/UL (ref 3.88–5.62)
SODIUM SERPL-SCNC: 142 MMOL/L (ref 135–147)
TRIGL SERPL-MCNC: 81 MG/DL
TSH SERPL DL<=0.05 MIU/L-ACNC: 3.37 UIU/ML (ref 0.45–4.5)
WBC # BLD AUTO: 6.52 THOUSAND/UL (ref 4.31–10.16)

## 2023-12-01 PROCEDURE — 80061 LIPID PANEL: CPT

## 2023-12-01 PROCEDURE — 84443 ASSAY THYROID STIM HORMONE: CPT

## 2023-12-01 PROCEDURE — 36415 COLL VENOUS BLD VENIPUNCTURE: CPT

## 2023-12-01 PROCEDURE — 85025 COMPLETE CBC W/AUTO DIFF WBC: CPT

## 2023-12-01 PROCEDURE — G0008 ADMIN INFLUENZA VIRUS VAC: HCPCS | Performed by: FAMILY MEDICINE

## 2023-12-01 PROCEDURE — 99215 OFFICE O/P EST HI 40 MIN: CPT | Performed by: FAMILY MEDICINE

## 2023-12-01 PROCEDURE — 83036 HEMOGLOBIN GLYCOSYLATED A1C: CPT

## 2023-12-01 PROCEDURE — 90662 IIV NO PRSV INCREASED AG IM: CPT | Performed by: FAMILY MEDICINE

## 2023-12-01 PROCEDURE — 80053 COMPREHEN METABOLIC PANEL: CPT

## 2023-12-01 NOTE — ASSESSMENT & PLAN NOTE
Lab Results   Component Value Date    EGFR 54 05/02/2022    EGFR 51 04/13/2021    EGFR 40 03/19/2020    CREATININE 1.26 05/02/2022    CREATININE 1.33 (H) 04/13/2021    CREATININE 1.64 (H) 03/19/2020     Chronic renal insufficiency. Creatinine has stayed stable. Repeat creatinine in 6 months.   Patient really does not drink that much water because it is difficult for him getting to the bathroom due to his spastic hemiplegia

## 2023-12-01 NOTE — ASSESSMENT & PLAN NOTE
Patient has seen neurology in the past.  There is no treatment for this condition. There usually is a gradual progression.   He does ambulate with the assistance of a walker

## 2023-12-01 NOTE — ASSESSMENT & PLAN NOTE
Continues to be very well-controlled on amlodipine/benazepril and metoprolol. Continue same. Reevaluate in 6 months.   Contact the office when refills are necessary

## 2023-12-01 NOTE — PROGRESS NOTES
Subjective:      Patient ID: Kurtis Colin is a 78 y.o. male. 78-year-old male with past medical history of hypertension, hereditary spastic hemiplegia, gait dysfunction presents with his wife for 6-month follow-up of chronic conditions. Overall he has been feeling well. Patient has lost weight. Wife states that he does eat 3 meals a day but just is not eating as much as he used to. Previously he would eat 2-3 hamburgers at a setting and now he only eats 1. Just had labs done this morning. No particular complaints        Past Medical History:   Diagnosis Date   • Gait disturbance 11/6/2013   • Lipoma     last assessed: 1/23/2015       Family History   Problem Relation Age of Onset   • No Known Problems Mother    • Stroke Family         ischemic       Past Surgical History:   Procedure Laterality Date   • CERVICAL FUSION      vertebral        reports that he has quit smoking. His smokeless tobacco use includes chew. He reports that he does not drink alcohol and does not use drugs. Current Outpatient Medications:   •  amLODIPine-benazepril (LOTREL) 10-40 MG per capsule, Take 1 capsule by mouth daily, Disp: 90 capsule, Rfl: 1  •  aspirin 81 MG tablet, Take 1 tablet by mouth daily, Disp: , Rfl:   •  baclofen 10 mg tablet, TAKE 1 TABLET BY MOUTH THREE TIMES A DAY, Disp: 270 tablet, Rfl: 1  •  metoprolol tartrate (LOPRESSOR) 100 mg tablet, Take 1 tablet (100 mg total) by mouth 2 (two) times a day, Disp: 180 tablet, Rfl: 1    The following portions of the patient's history were reviewed and updated as appropriate: allergies, current medications, past family history, past medical history, past social history, past surgical history and problem list.    Review of Systems   Constitutional: Negative. HENT: Negative. Eyes: Negative. Respiratory: Negative. Cardiovascular: Negative. Gastrointestinal: Negative. Endocrine: Negative. Genitourinary: Negative.     Musculoskeletal:  Positive for gait problem ( ambulates with the assistance of a walker). Skin:         Large sebaceous cyst just to the right of the sternum   Allergic/Immunologic: Negative. Neurological:  Positive for weakness (Bilateral lower extremities). Hematological: Negative. Psychiatric/Behavioral: Negative. All other systems reviewed and are negative. Objective:    /70   Pulse 64   Resp 16   Ht 6' 2" (1.88 m)   Wt 87.5 kg (193 lb)   SpO2 97%   BMI 24.78 kg/m²      Physical Exam  Vitals and nursing note reviewed. Constitutional:       General: He is not in acute distress. Appearance: Normal appearance. He is well-developed and normal weight. He is not ill-appearing. HENT:      Head: Normocephalic and atraumatic. Right Ear: Tympanic membrane, ear canal and external ear normal.      Left Ear: Tympanic membrane, ear canal and external ear normal.      Nose: Nose normal.      Mouth/Throat:      Mouth: Mucous membranes are moist.   Eyes:      Extraocular Movements: Extraocular movements intact. Conjunctiva/sclera: Conjunctivae normal.      Pupils: Pupils are equal, round, and reactive to light. Cardiovascular:      Rate and Rhythm: Normal rate and regular rhythm. Pulses: Normal pulses. Heart sounds: Normal heart sounds. No murmur heard. Pulmonary:      Effort: Pulmonary effort is normal.      Breath sounds: Normal breath sounds. Abdominal:      General: Abdomen is flat. Bowel sounds are normal.      Palpations: Abdomen is soft. Musculoskeletal:         General: Normal range of motion. Cervical back: Normal range of motion and neck supple. Skin:     General: Skin is warm and dry. Findings: Rash (seborrheic keratoses as well as actinic damage upper extremities, scalp) present. Comments: Large sebaceous cyst to the right of the sternum. No evidence of inflammation but there is a odor   Neurological:      General: No focal deficit present.       Mental Status: He is alert and oriented to person, place, and time. Motor: Weakness (Bilateral lower extremities) present. Gait: Gait abnormal.      Deep Tendon Reflexes: Reflexes abnormal ( hyperreflexia lower extremities). Psychiatric:         Mood and Affect: Mood normal.         Behavior: Behavior normal.         Thought Content: Thought content normal.         Judgment: Judgment normal.           No results found for this or any previous visit (from the past 1008 hour(s)). Assessment/Plan:    Benign essential hypertension  Continues to be very well-controlled on amlodipine/benazepril and metoprolol. Continue same. Reevaluate in 6 months. Contact the office when refills are necessary    Hereditary spastic paraplegia [facility])  Patient has seen neurology in the past.  There is no treatment for this condition. There usually is a gradual progression. He does ambulate with the assistance of a walker    Stage 3 chronic kidney disease, unspecified whether stage 3a or 3b CKD ([address])  Lab Results   Component Value Date    EGFR 54 05/02/2022    EGFR 51 04/13/2021    EGFR 40 03/19/2020    CREATININE 1.26 05/02/2022    CREATININE 1.33 (H) 04/13/2021    CREATININE 1.64 (H) 03/19/2020     Chronic renal insufficiency. Creatinine has stayed stable. Repeat creatinine in 6 months. Patient really does not drink that much water because it is difficult for him getting to the bathroom due to his spastic hemiplegia    Hypercholesterolemia  Repeat lipid panel is pending. Blood work was just drawn this morning    Abnormal glucose  Check hemoglobin A1c in 6 months    Sebaceous cyst  Very large sebaceous cyst on the right anterior chest wall. Referral to plastic surgeon for evaluation and removal          Problem List Items Addressed This Visit        Cardiovascular and Mediastinum    Benign essential hypertension - Primary     Continues to be very well-controlled on amlodipine/benazepril and metoprolol. Continue same. Reevaluate in 6 months. Contact the office when refills are necessary         Relevant Orders    CBC and differential    Comprehensive metabolic panel    TSH, 3rd generation with Free T4 reflex       Nervous and Auditory    Hereditary spastic paraplegia [facility])     Patient has seen neurology in the past.  There is no treatment for this condition. There usually is a gradual progression. He does ambulate with the assistance of a walker            Musculoskeletal and Integument    Sebaceous cyst     Very large sebaceous cyst on the right anterior chest wall. Referral to plastic surgeon for evaluation and removal         Relevant Orders    Ambulatory Referral to Plastic Surgery       Genitourinary    Stage 3 chronic kidney disease, unspecified whether stage 3a or 3b CKD ([address] )     Lab Results   Component Value Date    EGFR 54 05/02/2022    EGFR 51 04/13/2021    EGFR 40 03/19/2020    CREATININE 1.26 05/02/2022    CREATININE 1.33 (H) 04/13/2021    CREATININE 1.64 (H) 03/19/2020     Chronic renal insufficiency. Creatinine has stayed stable. Repeat creatinine in 6 months. Patient really does not drink that much water because it is difficult for him getting to the bathroom due to his spastic hemiplegia            Other    Abnormal glucose     Check hemoglobin A1c in 6 months         Relevant Orders    Hemoglobin A1C    Hypercholesterolemia     Repeat lipid panel is pending.   Blood work was just drawn this morning         Relevant Orders    Comprehensive metabolic panel    Lipid panel    Prostate cancer screening    Relevant Orders    PSA, Total Screen   Other Visit Diagnoses     Encounter for immunization        Relevant Orders    influenza vaccine, high-dose, PF 0.7 mL (FLUZONE HIGH-DOSE) (Completed)

## 2023-12-01 NOTE — ASSESSMENT & PLAN NOTE
Very large sebaceous cyst on the right anterior chest wall.   Referral to plastic surgeon for evaluation and removal

## 2023-12-06 ENCOUNTER — TELEPHONE (OUTPATIENT)
Dept: FAMILY MEDICINE CLINIC | Facility: CLINIC | Age: 78
End: 2023-12-06

## 2023-12-06 DIAGNOSIS — R73.09 ABNORMAL GLUCOSE: ICD-10-CM

## 2023-12-06 DIAGNOSIS — E78.00 HYPERCHOLESTEROLEMIA: ICD-10-CM

## 2023-12-06 DIAGNOSIS — G11.4 HEREDITARY SPASTIC PARAPLEGIA (HCC): ICD-10-CM

## 2023-12-06 DIAGNOSIS — I10 BENIGN ESSENTIAL HYPERTENSION: Primary | ICD-10-CM

## 2023-12-06 DIAGNOSIS — N18.30 STAGE 3 CHRONIC KIDNEY DISEASE, UNSPECIFIED WHETHER STAGE 3A OR 3B CKD (HCC): ICD-10-CM

## 2023-12-06 NOTE — TELEPHONE ENCOUNTER
----- Message from Pooja Barraza DO sent at 12/6/2023  9:48 AM EST -----  Please call patient and notify that results are essentially normal.  Fasting blood sugar 127 and hemoglobin A1c 6.3%. No changes in medications.  Please keep follow-up appointment as scheduled in June and have labs done before the appointment

## 2023-12-06 NOTE — RESULT ENCOUNTER NOTE
Please call patient and notify that results are essentially normal.  Fasting blood sugar 127 and hemoglobin A1c 6.3%. No changes in medications.  Please keep follow-up appointment as scheduled in June and have labs done before the appointment

## 2024-01-26 DIAGNOSIS — G11.4 HEREDITARY SPASTIC PARAPLEGIA (HCC): ICD-10-CM

## 2024-01-26 RX ORDER — BACLOFEN 10 MG/1
TABLET ORAL
Qty: 270 TABLET | Refills: 1 | Status: SHIPPED | OUTPATIENT
Start: 2024-01-26

## 2024-01-26 NOTE — TELEPHONE ENCOUNTER
Patients wife called stating her  needed a refill on baclofen I informed her it is awaiting the doctors approval, she understood and let me know the patient is completely out at this time.

## 2024-02-21 PROBLEM — Z12.5 PROSTATE CANCER SCREENING: Status: RESOLVED | Noted: 2018-07-18 | Resolved: 2024-02-21

## 2024-02-21 PROBLEM — Z12.11 COLON CANCER SCREENING: Status: RESOLVED | Noted: 2019-01-23 | Resolved: 2024-02-21

## 2024-02-21 PROBLEM — Z00.00 MEDICARE ANNUAL WELLNESS VISIT, SUBSEQUENT: Status: RESOLVED | Noted: 2018-07-18 | Resolved: 2024-02-21

## 2024-05-31 ENCOUNTER — RA CDI HCC (OUTPATIENT)
Dept: OTHER | Facility: HOSPITAL | Age: 79
End: 2024-05-31

## 2024-06-10 ENCOUNTER — OFFICE VISIT (OUTPATIENT)
Dept: FAMILY MEDICINE CLINIC | Facility: CLINIC | Age: 79
End: 2024-06-10
Payer: MEDICARE

## 2024-06-10 VITALS
OXYGEN SATURATION: 95 % | SYSTOLIC BLOOD PRESSURE: 128 MMHG | BODY MASS INDEX: 24.9 KG/M2 | DIASTOLIC BLOOD PRESSURE: 60 MMHG | HEIGHT: 74 IN | RESPIRATION RATE: 18 BRPM | WEIGHT: 194 LBS | HEART RATE: 60 BPM

## 2024-06-10 DIAGNOSIS — G60.9 IDIOPATHIC PERIPHERAL NEUROPATHY: ICD-10-CM

## 2024-06-10 DIAGNOSIS — I10 BENIGN ESSENTIAL HYPERTENSION: ICD-10-CM

## 2024-06-10 DIAGNOSIS — Z00.00 MEDICARE ANNUAL WELLNESS VISIT, SUBSEQUENT: Primary | ICD-10-CM

## 2024-06-10 DIAGNOSIS — N18.30 STAGE 3 CHRONIC KIDNEY DISEASE, UNSPECIFIED WHETHER STAGE 3A OR 3B CKD (HCC): ICD-10-CM

## 2024-06-10 DIAGNOSIS — G11.4 HEREDITARY SPASTIC PARAPLEGIA (HCC): ICD-10-CM

## 2024-06-10 DIAGNOSIS — Z23 ENCOUNTER FOR IMMUNIZATION: ICD-10-CM

## 2024-06-10 DIAGNOSIS — R73.09 ABNORMAL GLUCOSE: ICD-10-CM

## 2024-06-10 DIAGNOSIS — E78.00 HYPERCHOLESTEROLEMIA: ICD-10-CM

## 2024-06-10 PROBLEM — L02.212 ABSCESS OF BACK: Status: RESOLVED | Noted: 2023-03-21 | Resolved: 2024-06-10

## 2024-06-10 PROCEDURE — G0009 ADMIN PNEUMOCOCCAL VACCINE: HCPCS | Performed by: FAMILY MEDICINE

## 2024-06-10 PROCEDURE — 90677 PCV20 VACCINE IM: CPT | Performed by: FAMILY MEDICINE

## 2024-06-10 PROCEDURE — G0439 PPPS, SUBSEQ VISIT: HCPCS | Performed by: FAMILY MEDICINE

## 2024-06-10 PROCEDURE — 99213 OFFICE O/P EST LOW 20 MIN: CPT | Performed by: FAMILY MEDICINE

## 2024-06-10 RX ORDER — AMLODIPINE AND BENAZEPRIL HYDROCHLORIDE 10; 40 MG/1; MG/1
1 CAPSULE ORAL DAILY
Qty: 90 CAPSULE | Refills: 1 | Status: SHIPPED | OUTPATIENT
Start: 2024-06-10

## 2024-06-10 RX ORDER — METOPROLOL TARTRATE 100 MG/1
100 TABLET ORAL 2 TIMES DAILY
Qty: 180 TABLET | Refills: 1 | Status: SHIPPED | OUTPATIENT
Start: 2024-06-10

## 2024-06-10 NOTE — ASSESSMENT & PLAN NOTE
Continues to be very well-controlled on amlodipine/benazepril and metoprolol.  Continue same.  Reevaluate in 6 months.  Refills provided

## 2024-06-10 NOTE — PROGRESS NOTES
Assessment and Plan:     Problem List Items Addressed This Visit        Cardiovascular and Mediastinum    Benign essential hypertension     Continues to be very well-controlled on amlodipine/benazepril and metoprolol.  Continue same.  Reevaluate in 6 months.  Refills provided         Relevant Medications    amLODIPine-benazepril (LOTREL) 10-40 MG per capsule    metoprolol tartrate (LOPRESSOR) 100 mg tablet       Nervous and Auditory    Hereditary spastic paraplegia (HCC)     Patient has seen neurology in the past.  There is no treatment for this condition.  There usually is a gradual progression.  He does ambulate with the assistance of a walker         RESOLVED: Peripheral neuropathy       Genitourinary    Stage 3 chronic kidney disease, unspecified whether stage 3a or 3b CKD ([facility])     Lab Results   Component Value Date    EGFR 62 12/01/2023    EGFR 54 05/02/2022    EGFR 51 04/13/2021    CREATININE 1.11 12/01/2023    CREATININE 1.26 05/02/2022    CREATININE 1.33 (H) 04/13/2021     Patient is overdue for repeat labs.  He does make certain that he remains hydrated and does drink water throughout the course of the day            Other    Abnormal glucose     Due for repeat fasting glucose         Hypercholesterolemia     Due for repeat lipid panel         Medicare annual wellness visit, subsequent - Primary     Subsequent annual wellness visit completed        Other Visit Diagnoses     Encounter for immunization        Relevant Orders    Pneumococcal Conjugate Vaccine 20-valent (Pcv20) (Completed)           History of Present Illness:     Patient presents for Medicare Annual Wellness visit    Patient Care Team:  Hector Dubois DO as PCP - General  DO Rudy Gallego MD Sruthi Devarinti, DO (Neurology)     Problem List:     Patient Active Problem List   Diagnosis   • Gait disturbance   • Medicare annual wellness visit, subsequent   • Abnormal glucose   • Benign essential hypertension   • Benign prostatic  hyperplasia without urinary obstruction   • Hypercholesterolemia   • Need for shingles vaccine   • Hereditary spastic paraplegia (HCC)   • Actinic keratoses   • Stage 3 chronic kidney disease, unspecified whether stage 3a or 3b CKD (HCC)   • Sebaceous cyst      Past Medical and Surgical History:     Past Medical History:   Diagnosis Date   • Gait disturbance 11/6/2013   • Lipoma     last assessed: 1/23/2015     Past Surgical History:   Procedure Laterality Date   • CERVICAL FUSION      vertebral      Family History:     Family History   Problem Relation Age of Onset   • No Known Problems Mother    • Stroke Family         ischemic      Social History:     Social History     Tobacco Use   Smoking Status Former   Smokeless Tobacco Current   • Types: Chew     Social History     Substance and Sexual Activity   Alcohol Use Never     Social History     Substance and Sexual Activity   Drug Use Never      Medications and Allergies:     Current Outpatient Medications   Medication Sig Dispense Refill   • amLODIPine-benazepril (LOTREL) 10-40 MG per capsule Take 1 capsule by mouth daily 90 capsule 1   • aspirin 81 MG tablet Take 1 tablet by mouth daily     • baclofen 10 mg tablet TAKE 1 TABLET BY MOUTH THREE TIMES A  tablet 1   • metoprolol tartrate (LOPRESSOR) 100 mg tablet Take 1 tablet (100 mg total) by mouth 2 (two) times a day 180 tablet 1     No current facility-administered medications for this visit.     Allergies   Allergen Reactions   • Ibuprofen      Reaction Date: 08Oct2007;       Immunizations:     Immunization History   Administered Date(s) Administered   • COVID-19 PFIZER VACCINE 0.3 ML IM 03/11/2021, 04/02/2021   • Influenza Split High Dose Preservative Free IM 11/06/2013, 10/14/2014, 11/11/2015, 11/22/2016, 12/07/2017, 11/15/2019   • Influenza, high dose seasonal 0.7 mL 12/01/2023   • Influenza, seasonal, injectable 09/30/2010, 09/07/2011   • Pneumococcal Conjugate 13-Valent 05/18/2016   • Pneumococcal  Conjugate Vaccine 20-valent (Pcv20), Polysace 06/10/2024   • Pneumococcal Polysaccharide PPV23 04/29/2010      Medicare Screening Tests and Risk Assessments:     Neri is here for his Subsequent Wellness visit. Last Medicare Wellness visit information reviewed, patient interviewed, no change since last AWV.     Health Risk Assessment:   Patient rates overall health as good. Patient feels that their physical health rating is Slightly better. Patient is satisfied with their life. Eyesight was rated as Same. Hearing was rated as Same. Patient feels that their emotional and mental health rating is Slightly better. Patients states they are never, rarely angry. Patient states they are sometimes unusually tired/fatigued. Pain experienced in the last 7 days has been Some. Patient states that he has experienced no weight loss or gain in last 6 months.     Depression Screening:   PHQ-2 Score: 0      Fall Risk Screening:   In the past year, patient has experienced: no history of falling in past year      Home Safety:  Patient has trouble with stairs inside or outside of their home. Patient has working smoke alarms and has no working carbon monoxide detector. Home safety hazards include: none.     Nutrition:   Current diet is Regular.     Medications:   Patient is currently taking over-the-counter supplements. OTC medications include: see medication list. Patient is able to manage medications.     Activities of Daily Living (ADLs)/Instrumental Activities of Daily Living (IADLs):   Walk and transfer into and out of bed and chair?: Yes  Dress and groom yourself?: Yes    Bathe or shower yourself?: Yes    Feed yourself? Yes  Do your laundry/housekeeping?: Yes  Manage your money, pay your bills and track your expenses?: Yes  Make your own meals?: Yes    Do your own shopping?: Yes    Previous Hospitalizations:   Any hospitalizations or ED visits within the last 12 months?: No      Advance Care Planning:   Living will: No    Durable  POA for healthcare: No    Advanced directive: No    Advanced directive counseling given: Yes    ACP document given: Yes    Patient declined ACP directive: No    End of Life Decisions reviewed with patient: No    Provider agrees with end of life decisions: No      PREVENTIVE SCREENINGS      Cardiovascular Screening:    General: Screening Not Indicated and History Lipid Disorder      Diabetes Screening:     General: Screening Current      Colorectal Cancer Screening:     General: Risks and Benefits Discussed    Due for: Fecal Occult Blood      Prostate Cancer Screening:    General: Screening Not Indicated      Osteoporosis Screening:    General: Screening Current      Abdominal Aortic Aneurysm (AAA) Screening:    Risk factors include: tobacco use        General: Screening Current      Lung Cancer Screening:     General: Screening Not Indicated      Hepatitis C Screening:    General: Screening Current    Screening, Brief Intervention, and Referral to Treatment (SBIRT)    Screening  Typical number of drinks in a day: 0  Typical number of drinks in a week: 0  Interpretation: Low risk drinking behavior.    AUDIT-C Screenin) How often did you have a drink containing alcohol in the past year? never  2) How many drinks did you have on a typical day when you were drinking in the past year? 0  3) How often did you have 6 or more drinks on one occasion in the past year? never    AUDIT-C Score: 0  Interpretation: Score 0-3 (male): Negative screen for alcohol misuse    Single Item Drug Screening:  How often have you used an illegal drug (including marijuana) or a prescription medication for non-medical reasons in the past year? never    Single Item Drug Screen Score: 0  Interpretation: Negative screen for possible drug use disorder      BMI Counseling: Body mass index is 24.91 kg/m². The BMI is above normal. Nutrition recommendations include reducing intake of saturated and trans fat and reducing intake of cholesterol.  Exercise recommendations include strength training exercises. Rationale for BMI follow-up plan is due to patient being overweight or obese.     Depression Screening and Follow-up Plan: Patient was screened for depression during today's encounter. They screened negative with a PHQ-2 score of 0.    Tobacco Cessation Counseling: Tobacco cessation counseling was provided. The patient is sincerely urged to quit consumption of tobacco. He is not ready to quit tobacco. Medication options and side effects of medication not discussed. Patient refused medication.         78-year-old male with past medical history of hypertension, hereditary spastic hemiplegia, gait dysfunction presents with his wife for subsequent annual wellness visit and 6-month follow-up of chronic conditions.  Overall he has been feeling well.  Patient has lost weight.  Wife states that he does eat 3 meals a day but just is not eating as much as he used to.  Did not have labs completed for this office visit.  Wife states that he is not as active and tends to stay on the sofa.  He does still have persistent weakness in both of his legs.  He does ambulate with the assistance of a walker        Review of Systems   Constitutional: Negative.    HENT: Negative.     Eyes: Negative.    Respiratory: Negative.     Cardiovascular: Negative.    Gastrointestinal: Negative.    Endocrine: Negative.    Genitourinary: Negative.    Musculoskeletal:  Positive for gait problem ( ambulates with the assistance of a walker).   Skin: Negative.    Allergic/Immunologic: Negative.    Neurological:  Positive for weakness (Bilateral lower extremities).   Hematological: Negative.    Psychiatric/Behavioral: Negative.     All other systems reviewed and are negative.      Physical Exam  Vitals and nursing note reviewed.   Constitutional:       General: He is not in acute distress.     Appearance: Normal appearance. He is well-developed and normal weight. He is not ill-appearing.   HENT:       Head: Normocephalic and atraumatic.      Mouth/Throat:      Comments: edentulous with upper dentures.  No oral lesions  Eyes:      Conjunctiva/sclera: Conjunctivae normal.      Pupils: Pupils are equal, round, and reactive to light.   Cardiovascular:      Rate and Rhythm: Normal rate and regular rhythm.      Heart sounds: Normal heart sounds.   Pulmonary:      Effort: Pulmonary effort is normal.      Breath sounds: Normal breath sounds.   Abdominal:      General: Bowel sounds are normal.      Palpations: Abdomen is soft.   Musculoskeletal:         General: Normal range of motion.      Cervical back: Normal range of motion and neck supple.   Skin:     Comments: Large lipoma on the right anterior chest wall   Neurological:      Mental Status: He is alert and oriented to person, place, and time. Mental status is at baseline.      Motor: Abnormal muscle tone present.      Gait: Gait abnormal.      Deep Tendon Reflexes: Reflexes abnormal.      Comments: Significant gait dysfunction and spasticity   Psychiatric:         Mood and Affect: Mood normal.         Behavior: Behavior normal.         Thought Content: Thought content normal.         Judgment: Judgment normal.

## 2024-06-10 NOTE — PATIENT INSTRUCTIONS
Medicare Preventive Visit Patient Instructions  Thank you for completing your Welcome to Medicare Visit or Medicare Annual Wellness Visit today. Your next wellness visit will be due in one year (6/11/2025).  The screening/preventive services that you may require over the next 5-10 years are detailed below. Some tests may not apply to you based off risk factors and/or age. Screening tests ordered at today's visit but not completed yet may show as past due. Also, please note that scanned in results may not display below.  Preventive Screenings:  Service Recommendations Previous Testing/Comments   Colorectal Cancer Screening  Colonoscopy    Fecal Occult Blood Test (FOBT)/Fecal Immunochemical Test (FIT)  Fecal DNA/Cologuard Test  Flexible Sigmoidoscopy Age: 45-75 years old   Colonoscopy: every 10 years (May be performed more frequently if at higher risk)  OR  FOBT/FIT: every 1 year  OR  Cologuard: every 3 years  OR  Sigmoidoscopy: every 5 years  Screening may be recommended earlier than age 45 if at higher risk for colorectal cancer. Also, an individualized decision between you and your healthcare provider will decide whether screening between the ages of 76-85 would be appropriate. Colonoscopy: Not on file  FOBT/FIT: Not on file  Cologuard: Not on file  Sigmoidoscopy: Not on file    Risks and Benefits Discussed  Due for Fecal Occult Blood     Prostate Cancer Screening Individualized decision between patient and health care provider in men between ages of 55-69   Medicare will cover every 12 months beginning on the day after your 50th birthday PSA: 0.4 ng/mL     Screening Not Indicated     Hepatitis C Screening Once for adults born between 1945 and 1965  More frequently in patients at high risk for Hepatitis C Hep C Antibody: Not on file    Screening Current   Diabetes Screening 1-2 times per year if you're at risk for diabetes or have pre-diabetes Fasting glucose: 127 mg/dL (12/1/2023)  A1C: 6.3 %  (12/1/2023)  Screening Current   Cholesterol Screening Once every 5 years if you don't have a lipid disorder. May order more often based on risk factors. Lipid panel: 12/01/2023  Screening Not Indicated  History Lipid Disorder      Other Preventive Screenings Covered by Medicare:  Abdominal Aortic Aneurysm (AAA) Screening: covered once if your at risk. You're considered to be at risk if you have a family history of AAA or a male between the age of 65-75 who smoking at least 100 cigarettes in your lifetime.  Lung Cancer Screening: covers low dose CT scan once per year if you meet all of the following conditions: (1) Age 55-77; (2) No signs or symptoms of lung cancer; (3) Current smoker or have quit smoking within the last 15 years; (4) You have a tobacco smoking history of at least 20 pack years (packs per day x number of years you smoked); (5) You get a written order from a healthcare provider.  Glaucoma Screening: covered annually if you're considered high risk: (1) You have diabetes OR (2) Family history of glaucoma OR (3)  aged 50 and older OR (4)  American aged 65 and older  Osteoporosis Screening: covered every 2 years if you meet one of the following conditions: (1) Have a vertebral abnormality; (2) On glucocorticoid therapy for more than 3 months; (3) Have primary hyperparathyroidism; (4) On osteoporosis medications and need to assess response to drug therapy.  HIV Screening: covered annually if you're between the age of 15-65. Also covered annually if you are younger than 15 and older than 65 with risk factors for HIV infection. For pregnant patients, it is covered up to 3 times per pregnancy.    Immunizations:  Immunization Recommendations   Influenza Vaccine Annual influenza vaccination during flu season is recommended for all persons aged >= 6 months who do not have contraindications   Pneumococcal Vaccine   * Pneumococcal conjugate vaccine = PCV13 (Prevnar 13), PCV15  (Vaxneuvance), PCV20 (Prevnar 20)  * Pneumococcal polysaccharide vaccine = PPSV23 (Pneumovax) Adults 19-63 yo with certain risk factors or if 65+ yo  If never received any pneumonia vaccine: recommend Prevnar 20 (PCV20)  Give PCV20 if previously received 1 dose of PCV13 or PPSV23   Hepatitis B Vaccine 3 dose series if at intermediate or high risk (ex: diabetes, end stage renal disease, liver disease)   Respiratory syncytial virus (RSV) Vaccine - COVERED BY MEDICARE PART D  * RSVPreF3 (Arexvy) CDC recommends that adults 60 years of age and older may receive a single dose of RSV vaccine using shared clinical decision-making (SCDM)   Tetanus (Td) Vaccine - COST NOT COVERED BY MEDICARE PART B Following completion of primary series, a booster dose should be given every 10 years to maintain immunity against tetanus. Td may also be given as tetanus wound prophylaxis.   Tdap Vaccine - COST NOT COVERED BY MEDICARE PART B Recommended at least once for all adults. For pregnant patients, recommended with each pregnancy.   Shingles Vaccine (Shingrix) - COST NOT COVERED BY MEDICARE PART B  2 shot series recommended in those 19 years and older who have or will have weakened immune systems or those 50 years and older     Health Maintenance Due:  There are no preventive care reminders to display for this patient.  Immunizations Due:      Topic Date Due   • COVID-19 Vaccine (3 - 2023-24 season) 09/01/2023     Advance Directives   What are advance directives?  Advance directives are legal documents that state your wishes and plans for medical care. These plans are made ahead of time in case you lose your ability to make decisions for yourself. Advance directives can apply to any medical decision, such as the treatments you want, and if you want to donate organs.   What are the types of advance directives?  There are many types of advance directives, and each state has rules about how to use them. You may choose a combination of any of  the following:  Living will:  This is a written record of the treatment you want. You can also choose which treatments you do not want, which to limit, and which to stop at a certain time. This includes surgery, medicine, IV fluid, and tube feedings.   Durable power of  for healthcare (DPAHC):  This is a written record that states who you want to make healthcare choices for you when you are unable to make them for yourself. This person, called a proxy, is usually a family member or a friend. You may choose more than 1 proxy.  Do not resuscitate (DNR) order:  A DNR order is used in case your heart stops beating or you stop breathing. It is a request not to have certain forms of treatment, such as CPR. A DNR order may be included in other types of advance directives.  Medical directive:  This covers the care that you want if you are in a coma, near death, or unable to make decisions for yourself. You can list the treatments you want for each condition. Treatment may include pain medicine, surgery, blood transfusions, dialysis, IV or tube feedings, and a ventilator (breathing machine).  Values history:  This document has questions about your views, beliefs, and how you feel and think about life. This information can help others choose the care that you would choose.  Why are advance directives important?  An advance directive helps you control your care. Although spoken wishes may be used, it is better to have your wishes written down. Spoken wishes can be misunderstood, or not followed. Treatments may be given even if you do not want them. An advance directive may make it easier for your family to make difficult choices about your care.   How to Quit Using Smokeless Tobacco   Why it is important to stop using smokeless tobacco:  Smokeless tobacco comes in many forms. Examples include chew, snuff, dip, dissolvable tobacco, and snus. All smokeless tobacco products contain nicotine and may contain as much  nicotine as 3 cigarettes. You may be physically dependent on nicotine. You may also be emotionally addicted to it. The cravings can be strong, but it is important to quit using smokeless tobacco. You will improve your health and decrease your cancer, stroke, and heart attack risk. Mouth sores and tooth problems will also improve when you quit. You can benefit from quitting no matter how long you have used smokeless tobacco.   Prepare to stop using smokeless tobacco:  Nicotine is a highly addictive drug. Withdrawal symptoms can happen when you stop and make it hard to quit. The following can help keep you on track:  Set a quit date.    Tell friends, family, and coworkers that you plan to quit.    Remove all smokeless tobacco products from your home, car, and workplace.    Manage weight gain after you quit:  Nicotine can affect your metabolism. You may gain a few pounds after you quit. The following can help you control your weight:  Eat healthy foods.    Drink water before, during, and between meals.    Exercise as directed.         © Copyright [employer] 2018 Information is for End User's use only and may not be sold, redistributed or otherwise used for commercial purposes. All illustrations and images included in CareNotes® are the copyrighted property of [employer] Inc. or [employer]

## 2024-06-10 NOTE — ASSESSMENT & PLAN NOTE
Lab Results   Component Value Date    EGFR 62 12/01/2023    EGFR 54 05/02/2022    EGFR 51 04/13/2021    CREATININE 1.11 12/01/2023    CREATININE 1.26 05/02/2022    CREATININE 1.33 (H) 04/13/2021     Patient is overdue for repeat labs.  He does make certain that he remains hydrated and does drink water throughout the course of the day

## 2024-07-10 PROBLEM — Z00.00 MEDICARE ANNUAL WELLNESS VISIT, SUBSEQUENT: Status: RESOLVED | Noted: 2018-07-18 | Resolved: 2024-07-10

## 2024-07-26 DIAGNOSIS — G11.4 HEREDITARY SPASTIC PARAPLEGIA (HCC): ICD-10-CM

## 2024-07-26 RX ORDER — BACLOFEN 10 MG/1
TABLET ORAL
Qty: 270 TABLET | Refills: 1 | Status: SHIPPED | OUTPATIENT
Start: 2024-07-26

## 2024-07-26 NOTE — TELEPHONE ENCOUNTER
Refill must be reviewed and completed by the office or provider. The refill is unable to be approved or denied by the medication management team.     This refill cannot be delegated

## 2024-08-01 ENCOUNTER — APPOINTMENT (OUTPATIENT)
Dept: LAB | Facility: CLINIC | Age: 79
End: 2024-08-01
Payer: MEDICARE

## 2024-08-01 ENCOUNTER — LAB (OUTPATIENT)
Dept: LAB | Facility: CLINIC | Age: 79
End: 2024-08-01
Payer: MEDICARE

## 2024-08-01 DIAGNOSIS — E78.00 HYPERCHOLESTEROLEMIA: ICD-10-CM

## 2024-08-01 DIAGNOSIS — R73.09 ABNORMAL GLUCOSE: ICD-10-CM

## 2024-08-01 DIAGNOSIS — I10 BENIGN ESSENTIAL HYPERTENSION: ICD-10-CM

## 2024-08-01 DIAGNOSIS — Z12.5 PROSTATE CANCER SCREENING: ICD-10-CM

## 2024-08-01 LAB
ALBUMIN SERPL BCG-MCNC: 4 G/DL (ref 3.5–5)
ALP SERPL-CCNC: 61 U/L (ref 34–104)
ALT SERPL W P-5'-P-CCNC: 23 U/L (ref 7–52)
ANION GAP SERPL CALCULATED.3IONS-SCNC: 9 MMOL/L (ref 4–13)
AST SERPL W P-5'-P-CCNC: 19 U/L (ref 13–39)
BASOPHILS # BLD AUTO: 0.03 THOUSANDS/ÂΜL (ref 0–0.1)
BASOPHILS NFR BLD AUTO: 0 % (ref 0–1)
BILIRUB SERPL-MCNC: 0.91 MG/DL (ref 0.2–1)
BUN SERPL-MCNC: 16 MG/DL (ref 5–25)
CALCIUM SERPL-MCNC: 9.2 MG/DL (ref 8.4–10.2)
CHLORIDE SERPL-SCNC: 104 MMOL/L (ref 96–108)
CHOLEST SERPL-MCNC: 130 MG/DL
CO2 SERPL-SCNC: 28 MMOL/L (ref 21–32)
CREAT SERPL-MCNC: 1.13 MG/DL (ref 0.6–1.3)
EOSINOPHIL # BLD AUTO: 0.14 THOUSAND/ÂΜL (ref 0–0.61)
EOSINOPHIL NFR BLD AUTO: 2 % (ref 0–6)
ERYTHROCYTE [DISTWIDTH] IN BLOOD BY AUTOMATED COUNT: 13.2 % (ref 11.6–15.1)
EST. AVERAGE GLUCOSE BLD GHB EST-MCNC: 128 MG/DL
GFR SERPL CREATININE-BSD FRML MDRD: 61 ML/MIN/1.73SQ M
GLUCOSE P FAST SERPL-MCNC: 103 MG/DL (ref 65–99)
HBA1C MFR BLD: 6.1 %
HCT VFR BLD AUTO: 51.8 % (ref 36.5–49.3)
HDLC SERPL-MCNC: 34 MG/DL
HGB BLD-MCNC: 16.9 G/DL (ref 12–17)
IMM GRANULOCYTES # BLD AUTO: 0.06 THOUSAND/UL (ref 0–0.2)
IMM GRANULOCYTES NFR BLD AUTO: 1 % (ref 0–2)
LDLC SERPL CALC-MCNC: 76 MG/DL (ref 0–100)
LYMPHOCYTES # BLD AUTO: 2 THOUSANDS/ÂΜL (ref 0.6–4.47)
LYMPHOCYTES NFR BLD AUTO: 27 % (ref 14–44)
MCH RBC QN AUTO: 28.9 PG (ref 26.8–34.3)
MCHC RBC AUTO-ENTMCNC: 32.6 G/DL (ref 31.4–37.4)
MCV RBC AUTO: 89 FL (ref 82–98)
MONOCYTES # BLD AUTO: 0.56 THOUSAND/ÂΜL (ref 0.17–1.22)
MONOCYTES NFR BLD AUTO: 8 % (ref 4–12)
NEUTROPHILS # BLD AUTO: 4.59 THOUSANDS/ÂΜL (ref 1.85–7.62)
NEUTS SEG NFR BLD AUTO: 62 % (ref 43–75)
NONHDLC SERPL-MCNC: 96 MG/DL
NRBC BLD AUTO-RTO: 0 /100 WBCS
PLATELET # BLD AUTO: 197 THOUSANDS/UL (ref 149–390)
PMV BLD AUTO: 10.8 FL (ref 8.9–12.7)
POTASSIUM SERPL-SCNC: 4.5 MMOL/L (ref 3.5–5.3)
PROT SERPL-MCNC: 7 G/DL (ref 6.4–8.4)
PSA SERPL-MCNC: 0.54 NG/ML (ref 0–4)
RBC # BLD AUTO: 5.84 MILLION/UL (ref 3.88–5.62)
SODIUM SERPL-SCNC: 141 MMOL/L (ref 135–147)
TRIGL SERPL-MCNC: 101 MG/DL
TSH SERPL DL<=0.05 MIU/L-ACNC: 2.46 UIU/ML (ref 0.45–4.5)
WBC # BLD AUTO: 7.38 THOUSAND/UL (ref 4.31–10.16)

## 2024-08-01 PROCEDURE — 80061 LIPID PANEL: CPT

## 2024-08-01 PROCEDURE — G0103 PSA SCREENING: HCPCS

## 2024-08-01 PROCEDURE — 85025 COMPLETE CBC W/AUTO DIFF WBC: CPT

## 2024-08-01 PROCEDURE — 83036 HEMOGLOBIN GLYCOSYLATED A1C: CPT

## 2024-08-01 PROCEDURE — 80053 COMPREHEN METABOLIC PANEL: CPT

## 2024-08-01 PROCEDURE — 36415 COLL VENOUS BLD VENIPUNCTURE: CPT

## 2024-08-01 PROCEDURE — 84443 ASSAY THYROID STIM HORMONE: CPT

## 2024-08-02 ENCOUNTER — TELEPHONE (OUTPATIENT)
Dept: FAMILY MEDICINE CLINIC | Facility: CLINIC | Age: 79
End: 2024-08-02

## 2024-08-02 NOTE — RESULT ENCOUNTER NOTE
Please call patient and notify that results are normal. No changes in medications.  Please make follow-up appointment for 1 year from last appointment

## 2024-08-02 NOTE — TELEPHONE ENCOUNTER
----- Message from Hector Dubois DO sent at 8/2/2024  7:52 AM EDT -----  Please call patient and notify that results are normal. No changes in medications.  Please make follow-up appointment for 1 year from last appointment

## 2024-10-29 DIAGNOSIS — G11.4 HEREDITARY SPASTIC PARAPLEGIA (HCC): ICD-10-CM

## 2024-10-29 DIAGNOSIS — I10 BENIGN ESSENTIAL HYPERTENSION: ICD-10-CM

## 2024-10-29 NOTE — TELEPHONE ENCOUNTER
Reason for call:   [x] Refill   [] Prior Auth  [] Other:     Office:   [x] PCP/Provider -   [] Specialty/Provider -     Medication: baclofen 10 mg tablet     Dose/Frequency: TAKE 1 TABLET BY MOUTH THREE TIMES A DAY,     Quantity:  270 tablet     Medication: amLODIPine-benazepril (LOTREL) 10-40 MG per capsule     Dose/Frequency:  Take 1 capsule by mouth daily,     Quantity: 90 capsule     Medication: metoprolol tartrate (LOPRESSOR) 100 mg tablet     Dose/Frequency: Take 1 tablet (100 mg total) by mouth 2 (two) times a day,     Quantity:  180 tablet     Pharmacy: [facility] #[id number] - [place] [facility] - [address] [place] [address] RD     Does the patient have enough for 3 days?   [x] Yes   [] No - Send as HP to POD

## 2024-10-30 RX ORDER — BACLOFEN 10 MG/1
10 TABLET ORAL 3 TIMES DAILY
Qty: 270 TABLET | Refills: 0 | Status: SHIPPED | OUTPATIENT
Start: 2024-10-30

## 2024-10-30 RX ORDER — METOPROLOL TARTRATE 100 MG/1
100 TABLET ORAL 2 TIMES DAILY
Qty: 180 TABLET | Refills: 0 | Status: SHIPPED | OUTPATIENT
Start: 2024-10-30

## 2024-10-30 RX ORDER — AMLODIPINE AND BENAZEPRIL HYDROCHLORIDE 10; 40 MG/1; MG/1
1 CAPSULE ORAL DAILY
Qty: 90 CAPSULE | Refills: 0 | Status: SHIPPED | OUTPATIENT
Start: 2024-10-30

## 2025-01-27 DIAGNOSIS — G11.4 HEREDITARY SPASTIC PARAPLEGIA (HCC): ICD-10-CM

## 2025-01-27 RX ORDER — BACLOFEN 10 MG/1
10 TABLET ORAL 3 TIMES DAILY
Qty: 270 TABLET | Refills: 0 | Status: SHIPPED | OUTPATIENT
Start: 2025-01-27

## 2025-05-12 DIAGNOSIS — I10 BENIGN ESSENTIAL HYPERTENSION: ICD-10-CM

## 2025-05-12 RX ORDER — AMLODIPINE AND BENAZEPRIL HYDROCHLORIDE 10; 40 MG/1; MG/1
1 CAPSULE ORAL DAILY
Qty: 90 CAPSULE | Refills: 0 | Status: SHIPPED | OUTPATIENT
Start: 2025-05-12

## 2025-05-12 NOTE — TELEPHONE ENCOUNTER
Medication Refill Request       Medication: Amlodipine    Dose/Frequency: 10-40 mg daily    Quantity: 90    Pharmacy: [address]/ [place]    Office:   [x] PCP/Provider -   [] Specialty/Provider -     Does the patient have enough for 3 days?   [x] Yes   [] No - Send as HP to POD

## 2025-06-17 ENCOUNTER — OFFICE VISIT (OUTPATIENT)
Dept: FAMILY MEDICINE CLINIC | Facility: CLINIC | Age: 80
End: 2025-06-17
Payer: MEDICARE

## 2025-06-17 VITALS
HEIGHT: 74 IN | WEIGHT: 203 LBS | DIASTOLIC BLOOD PRESSURE: 74 MMHG | SYSTOLIC BLOOD PRESSURE: 163 MMHG | BODY MASS INDEX: 26.05 KG/M2 | HEART RATE: 85 BPM | OXYGEN SATURATION: 97 %

## 2025-06-17 DIAGNOSIS — R73.09 ABNORMAL GLUCOSE: ICD-10-CM

## 2025-06-17 DIAGNOSIS — H61.92 LESION OF LEFT EXTERNAL EAR: ICD-10-CM

## 2025-06-17 DIAGNOSIS — I10 BENIGN ESSENTIAL HYPERTENSION: ICD-10-CM

## 2025-06-17 DIAGNOSIS — E78.00 HYPERCHOLESTEROLEMIA: ICD-10-CM

## 2025-06-17 DIAGNOSIS — Z99.89 USES WALKER: ICD-10-CM

## 2025-06-17 DIAGNOSIS — L72.3 SEBACEOUS CYST: ICD-10-CM

## 2025-06-17 DIAGNOSIS — G11.4 HEREDITARY SPASTIC PARAPLEGIA (HCC): ICD-10-CM

## 2025-06-17 DIAGNOSIS — Z00.00 MEDICARE ANNUAL WELLNESS VISIT, SUBSEQUENT: Primary | ICD-10-CM

## 2025-06-17 DIAGNOSIS — R26.9 GAIT DISTURBANCE: ICD-10-CM

## 2025-06-17 PROBLEM — N18.30 STAGE 3 CHRONIC KIDNEY DISEASE, UNSPECIFIED WHETHER STAGE 3A OR 3B CKD (HCC): Status: RESOLVED | Noted: 2022-05-02 | Resolved: 2025-06-17

## 2025-06-17 PROCEDURE — 99214 OFFICE O/P EST MOD 30 MIN: CPT | Performed by: FAMILY MEDICINE

## 2025-06-17 PROCEDURE — G2211 COMPLEX E/M VISIT ADD ON: HCPCS | Performed by: FAMILY MEDICINE

## 2025-06-17 PROCEDURE — G0439 PPPS, SUBSEQ VISIT: HCPCS | Performed by: FAMILY MEDICINE

## 2025-06-17 RX ORDER — METOPROLOL TARTRATE 100 MG/1
100 TABLET ORAL 2 TIMES DAILY
Qty: 180 TABLET | Refills: 0 | Status: SHIPPED | OUTPATIENT
Start: 2025-06-17

## 2025-06-17 NOTE — ASSESSMENT & PLAN NOTE
Subsequent annual wellness visit completed    - Patient is current on screenings for age  Orders:  •  CBC and differential; Future

## 2025-06-17 NOTE — ASSESSMENT & PLAN NOTE
On the antihelix of the left ear is an excoriated lesion that is raised with pearly borders.  Concerning for BCC    Referral to ENT for further evaluation and biopsy.  Orders:  •  Ambulatory Referral to Otolaryngology; Future

## 2025-06-17 NOTE — ASSESSMENT & PLAN NOTE
Hypertension is normally well-controlled on amlodipine/benazepril and metoprolol but he did not take his medication this morning.  Will be taking medication once he gets home.  Reevaluate in 6 months  Orders:  •  CBC and differential; Future  •  TSH, 3rd generation with Free T4 reflex; Future  •  metoprolol tartrate (LOPRESSOR) 100 mg tablet; Take 1 tablet (100 mg total) by mouth 2 (two) times a day

## 2025-06-17 NOTE — PROGRESS NOTES
Name: Neri Andrews Sr.      : 1945      MRN: [id number]  Encounter Provider: Hector Dubois DO  Encounter Date: 2025   Encounter department: [facility]  :  Assessment & Plan  Medicare annual wellness visit, subsequent  Subsequent annual wellness visit completed    - Patient is current on screenings for age  Orders:  •  CBC and differential; Future    Hypercholesterolemia  Check repeat lipid panel.  Patient does need to cut back on his ice cream  Orders:  •  Comprehensive metabolic panel; Future  •  Lipid panel; Future    Abnormal glucose  Watch dietary intake of carbohydrates  Orders:  •  Hemoglobin A1C; Future    Hereditary spastic paraplegia (HCC)  Has seen neurology in the past.  Ambulates with the assistance of a walker.  Had been offered physical therapy in the past which she declined       Uses walker  Certainly does       Benign essential hypertension  Hypertension is normally well-controlled on amlodipine/benazepril and metoprolol but he did not take his medication this morning.  Will be taking medication once he gets home.  Reevaluate in 6 months  Orders:  •  CBC and differential; Future  •  TSH, 3rd generation with Free T4 reflex; Future  •  metoprolol tartrate (LOPRESSOR) 100 mg tablet; Take 1 tablet (100 mg total) by mouth 2 (two) times a day    Lesion of left external ear  On the antihelix of the left ear is an excoriated lesion that is raised with pearly borders.  Concerning for BCC    Referral to ENT for further evaluation and biopsy.  Orders:  •  Ambulatory Referral to Otolaryngology; Future    Gait disturbance  Due to due to hereditary spastic hemiplegia.  Patient was seen by Neurology.  There were recommendations for physical therapy which the patient declined.  Patient does ambulate with the assistance of a walker       Sebaceous cyst  Large sebaceous cyst on the left mid back.  Referral to general surgeon for evaluation and management  Orders:  •  Ambulatory  Referral to General Surgery; Future      Depression Screening and Follow-up Plan: Patient was screened for depression during today's encounter. They screened negative with a PHQ-2 score of 0.      Tobacco Cessation Counseling: Tobacco cessation counseling was not provided. The patient is sincerely urged to quit consumption of tobacco. He is ready to quit tobacco. Medication options and side effects of medication not discussed. Patient agreed to medication. quit using chewing tobacco due to expense.  Does not miss it.  Has not used in over 1 year      Preventive health issues were discussed with patient, and age appropriate screening tests were ordered as noted in patient's After Visit Summary. Personalized health advice and appropriate referrals for health education or preventive services given if needed, as noted in patient's After Visit Summary.    History of Present Illness     79-year-old male with past medical history of hypertension, hereditary spastic hemiplegia, gait dysfunction presents with his wife for subsequent annual wellness visit and 6-month follow-up of chronic conditions.  Overall he has been feeling well.  Patient is eating 3 meals per day but just not as much as he used to.  Not as active due to spastic hemiplegia.  For the most part he sits on the sofa and watches television.  Stopped chewing tobacco 1 year ago due to expense and does not miss it.  Did not take antihypertensive medications today.  Will need to have blood work performed       Patient Care Team:  Hector Dubois DO as PCP - General  DO Rudy Gallego MD Sruthi Devarinti, DO (Neurology)    Review of Systems   Constitutional: Negative.    HENT: Negative.     Eyes: Negative.    Respiratory: Negative.     Cardiovascular: Negative.    Gastrointestinal: Negative.    Endocrine: Negative.    Genitourinary: Negative.    Musculoskeletal:  Positive for gait problem ( ambulates with the assistance of a walker).   Skin: Negative.     Allergic/Immunologic: Negative.    Neurological:  Positive for weakness (Bilateral lower extremities).   Hematological: Negative.    Psychiatric/Behavioral: Negative.     All other systems reviewed and are negative.    Medical History Reviewed by provider this encounter:  Meds  Problems       Annual Wellness Visit Questionnaire   Neri is here for his Subsequent Wellness visit. Last Medicare Wellness visit information reviewed, patient interviewed, no change since last AWV.     Health Risk Assessment:   Patient rates overall health as good. Patient feels that their physical health rating is Slightly better. Patient is satisfied with their life. Eyesight was rated as Same. Hearing was rated as Same. Patient feels that their emotional and mental health rating is Slightly better. Patients states they are never, rarely angry. Patient states they are sometimes unusually tired/fatigued. Pain experienced in the last 7 days has been Some. Patient states that he has experienced no weight loss or gain in last 6 months.     Depression Screening:   PHQ-2 Score: 0      Fall Risk Screening:   In the past year, patient has experienced: no history of falling in past year      Home Safety:  Patient has trouble with stairs inside or outside of their home. Patient has working smoke alarms and has no working carbon monoxide detector. Home safety hazards include: none.     Nutrition:   Current diet is Regular.     Medications:   Patient is currently taking over-the-counter supplements. OTC medications include: see medication list. Patient is able to manage medications.     Activities of Daily Living (ADLs)/Instrumental Activities of Daily Living (IADLs):   Walk and transfer into and out of bed and chair?: Yes  Dress and groom yourself?: Yes    Bathe or shower yourself?: Yes    Feed yourself? Yes  Do your laundry/housekeeping?: Yes  Manage your money, pay your bills and track your expenses?: Yes  Make your own meals?: Yes    Do your  own shopping?: Yes    Previous Hospitalizations:   Any hospitalizations or ED visits within the last 12 months?: No      Advance Care Planning:   Living will: No    Durable POA for healthcare: No    Advanced directive: No    Advanced directive counseling given: Yes    ACP document given: Yes    Patient declined ACP directive: No    End of Life Decisions reviewed with patient: No    Provider agrees with end of life decisions: No      Preventive Screenings      Cardiovascular Screening:    General: Screening Not Indicated and History Lipid Disorder      Diabetes Screening:     General: Screening Current      Colorectal Cancer Screening:     General: Risks and Benefits Discussed    Due for: Fecal Occult Blood      Prostate Cancer Screening:    General: Screening Not Indicated      Osteoporosis Screening:    General: Screening Current      Abdominal Aortic Aneurysm (AAA) Screening:    Risk factors include: tobacco use        General: Screening Current      Lung Cancer Screening:     General: Screening Not Indicated      Hepatitis C Screening:    General: Screening Current    Immunizations:  - Immunizations due: Zoster (Shingrix)    Screening, Brief Intervention, and Referral to Treatment (SBIRT)     Screening  Typical number of drinks in a day: 0  Typical number of drinks in a week: 0  Interpretation: Low risk drinking behavior.    Single Item Drug Screening:  How often have you used an illegal drug (including marijuana) or a prescription medication for non-medical reasons in the past year? never    Single Item Drug Screen Score: 0  Interpretation: Negative screen for possible drug use disorder    Social Drivers of Health     Financial Resource Strain: Low Risk  (5/26/2023)    Overall Financial Resource Strain (CARDIA)    • Difficulty of Paying Living Expenses: Not very hard   Food Insecurity: No Food Insecurity (6/17/2025)    Nursing - Inadequate Food Risk Classification    • Worried About Running Out of Food in the  "Last Year: Never true    • Ran Out of Food in the Last Year: Never true   Transportation Needs: No Transportation Needs (6/17/2025)    PRAPARE - Transportation    • Lack of Transportation (Medical): No    • Lack of Transportation (Non-Medical): No   Housing Stability: Low Risk  (6/17/2025)    Housing Stability Vital Sign    • Unable to Pay for Housing in the Last Year: No    • Number of Times Moved in the Last Year: 0    • Homeless in the Last Year: No   Utilities: Not At Risk (6/17/2025)    [facility] Utilities    • Threatened with loss of utilities: No     No results found.    Objective   /74   Pulse 85   Ht 6' 2\" (1.88 m)   Wt 92.1 kg (203 lb)   SpO2 97%   BMI 26.06 kg/m²     Physical Exam  Vitals and nursing note reviewed.   Constitutional:       General: He is not in acute distress.     Appearance: Normal appearance. He is well-developed and normal weight. He is not ill-appearing.   HENT:      Head: Normocephalic and atraumatic.      Ears:      Comments: Left external ear at the antihelix there is an excoriated area that is raised with pearly borders     Mouth/Throat:      Comments: edentulous with upper dentures.  No oral lesions    Eyes:      Conjunctiva/sclera: Conjunctivae normal.      Pupils: Pupils are equal, round, and reactive to light.       Cardiovascular:      Rate and Rhythm: Normal rate and regular rhythm.      Heart sounds: Normal heart sounds.   Pulmonary:      Effort: Pulmonary effort is normal.      Breath sounds: Normal breath sounds.   Abdominal:      General: Bowel sounds are normal.      Palpations: Abdomen is soft.     Musculoskeletal:         General: Normal range of motion.      Cervical back: Normal range of motion and neck supple.     Skin:     Comments: Large lipoma on the right anterior chest wall     Neurological:      Mental Status: He is alert and oriented to person, place, and time. Mental status is at baseline.      Motor: Abnormal muscle tone present.      Gait: Gait " abnormal.      Deep Tendon Reflexes: Reflexes abnormal.      Comments: Significant gait dysfunction and spasticity   Psychiatric:         Mood and Affect: Mood normal.         Behavior: Behavior normal.         Thought Content: Thought content normal.         Judgment: Judgment normal.        Media Information      Document Information    Clinical Image - Mobile Device   Left ear   06/17/2025 8:18 AM   Attached To:   Office Visit on 6/17/25 with Hector Dubois DO   Source Information    Hector Dubois DO  [facility]   Document History

## 2025-06-17 NOTE — ASSESSMENT & PLAN NOTE
Large sebaceous cyst on the left mid back.  Referral to general surgeon for evaluation and management  Orders:  •  Ambulatory Referral to General Surgery; Future

## 2025-06-17 NOTE — PATIENT INSTRUCTIONS
Medicare Preventive Visit Patient Instructions  Thank you for completing your Welcome to Medicare Visit or Medicare Annual Wellness Visit today. Your next wellness visit will be due in one year (6/18/2026).  The screening/preventive services that you may require over the next 5-10 years are detailed below. Some tests may not apply to you based off risk factors and/or age. Screening tests ordered at today's visit but not completed yet may show as past due. Also, please note that scanned in results may not display below.  Preventive Screenings:  Service Recommendations Previous Testing/Comments   Colorectal Cancer Screening  Colonoscopy    Fecal Occult Blood Test (FOBT)/Fecal Immunochemical Test (FIT)  Fecal DNA/Cologuard Test  Flexible Sigmoidoscopy Age: 45-75 years old   Colonoscopy: every 10 years (May be performed more frequently if at higher risk)  OR  FOBT/FIT: every 1 year  OR  Cologuard: every 3 years  OR  Sigmoidoscopy: every 5 years  Screening may be recommended earlier than age 45 if at higher risk for colorectal cancer. Also, an individualized decision between you and your healthcare provider will decide whether screening between the ages of 76-85 would be appropriate. Colonoscopy: Not on file  FOBT/FIT: Not on file  Cologuard: Not on file  Sigmoidoscopy: Not on file    Risks and Benefits Discussed  Due for Fecal Occult Blood     Prostate Cancer Screening Individualized decision between patient and health care provider in men between ages of 55-69   Medicare will cover every 12 months beginning on the day after your 50th birthday PSA: 0.544 ng/mL     Screening Not Indicated     Hepatitis C Screening Once for adults born between 1945 and 1965  More frequently in patients at high risk for Hepatitis C Hep C Antibody: Not on file    Screening Current   Diabetes Screening 1-2 times per year if you're at risk for diabetes or have pre-diabetes Fasting glucose: 103 mg/dL (8/1/2024)  A1C: 6.1 %  (8/1/2024)  Screening Current   Cholesterol Screening Once every 5 years if you don't have a lipid disorder. May order more often based on risk factors. Lipid panel: 08/01/2024  Screening Not Indicated  History Lipid Disorder      Other Preventive Screenings Covered by Medicare:  Abdominal Aortic Aneurysm (AAA) Screening: covered once if your at risk. You're considered to be at risk if you have a family history of AAA or a male between the age of 65-75 who smoking at least 100 cigarettes in your lifetime.  Lung Cancer Screening: covers low dose CT scan once per year if you meet all of the following conditions: (1) Age 55-77; (2) No signs or symptoms of lung cancer; (3) Current smoker or have quit smoking within the last 15 years; (4) You have a tobacco smoking history of at least 20 pack years (packs per day x number of years you smoked); (5) You get a written order from a healthcare provider.  Glaucoma Screening: covered annually if you're considered high risk: (1) You have diabetes OR (2) Family history of glaucoma OR (3)  aged 50 and older OR (4)  American aged 65 and older  Osteoporosis Screening: covered every 2 years if you meet one of the following conditions: (1) Have a vertebral abnormality; (2) On glucocorticoid therapy for more than 3 months; (3) Have primary hyperparathyroidism; (4) On osteoporosis medications and need to assess response to drug therapy.  HIV Screening: covered annually if you're between the age of 15-65. Also covered annually if you are younger than 15 and older than 65 with risk factors for HIV infection. For pregnant patients, it is covered up to 3 times per pregnancy.    Immunizations:  Immunization Recommendations   Influenza Vaccine Annual influenza vaccination during flu season is recommended for all persons aged >= 6 months who do not have contraindications   Pneumococcal Vaccine   * Pneumococcal conjugate vaccine = PCV13 (Prevnar 13), PCV15 (Vaxneuvance),  PCV20 (Prevnar 20)  * Pneumococcal polysaccharide vaccine = PPSV23 (Pneumovax) Adults 19-65 yo with certain risk factors or if 65+ yo  If never received any pneumonia vaccine: recommend Prevnar 20 (PCV20)  Give PCV20 if previously received 1 dose of PCV13 or PPSV23   Hepatitis B Vaccine 3 dose series if at intermediate or high risk (ex: diabetes, end stage renal disease, liver disease)   Respiratory syncytial virus (RSV) Vaccine - COVERED BY MEDICARE PART D  * RSVPreF3 (Arexvy) CDC recommends that adults 60 years of age and older may receive a single dose of RSV vaccine using shared clinical decision-making (SCDM)   Tetanus (Td) Vaccine - COST NOT COVERED BY MEDICARE PART B Following completion of primary series, a booster dose should be given every 10 years to maintain immunity against tetanus. Td may also be given as tetanus wound prophylaxis.   Tdap Vaccine - COST NOT COVERED BY MEDICARE PART B Recommended at least once for all adults. For pregnant patients, recommended with each pregnancy.   Shingles Vaccine (Shingrix) - COST NOT COVERED BY MEDICARE PART B  2 shot series recommended in those 19 years and older who have or will have weakened immune systems or those 50 years and older     Health Maintenance Due:  There are no preventive care reminders to display for this patient.  Immunizations Due:      Topic Date Due   • Influenza Vaccine (Season Ended) 09/01/2025     Advance Directives   What are advance directives?  Advance directives are legal documents that state your wishes and plans for medical care. These plans are made ahead of time in case you lose your ability to make decisions for yourself. Advance directives can apply to any medical decision, such as the treatments you want, and if you want to donate organs.   What are the types of advance directives?  There are many types of advance directives, and each state has rules about how to use them. You may choose a combination of any of the  following:  Living will:  This is a written record of the treatment you want. You can also choose which treatments you do not want, which to limit, and which to stop at a certain time. This includes surgery, medicine, IV fluid, and tube feedings.   Durable power of  for healthcare (DPAHC):  This is a written record that states who you want to make healthcare choices for you when you are unable to make them for yourself. This person, called a proxy, is usually a family member or a friend. You may choose more than 1 proxy.  Do not resuscitate (DNR) order:  A DNR order is used in case your heart stops beating or you stop breathing. It is a request not to have certain forms of treatment, such as CPR. A DNR order may be included in other types of advance directives.  Medical directive:  This covers the care that you want if you are in a coma, near death, or unable to make decisions for yourself. You can list the treatments you want for each condition. Treatment may include pain medicine, surgery, blood transfusions, dialysis, IV or tube feedings, and a ventilator (breathing machine).  Values history:  This document has questions about your views, beliefs, and how you feel and think about life. This information can help others choose the care that you would choose.  Why are advance directives important?  An advance directive helps you control your care. Although spoken wishes may be used, it is better to have your wishes written down. Spoken wishes can be misunderstood, or not followed. Treatments may be given even if you do not want them. An advance directive may make it easier for your family to make difficult choices about your care.   How to Quit Using Smokeless Tobacco   Why it is important to stop using smokeless tobacco:  Smokeless tobacco comes in many forms. Examples include chew, snuff, dip, dissolvable tobacco, and snus. All smokeless tobacco products contain nicotine and may contain as much nicotine as  3 cigarettes. You may be physically dependent on nicotine. You may also be emotionally addicted to it. The cravings can be strong, but it is important to quit using smokeless tobacco. You will improve your health and decrease your cancer, stroke, and heart attack risk. Mouth sores and tooth problems will also improve when you quit. You can benefit from quitting no matter how long you have used smokeless tobacco.   Prepare to stop using smokeless tobacco:  Nicotine is a highly addictive drug. Withdrawal symptoms can happen when you stop and make it hard to quit. The following can help keep you on track:  Set a quit date.    Tell friends, family, and coworkers that you plan to quit.    Remove all smokeless tobacco products from your home, car, and workplace.    Manage weight gain after you quit:  Nicotine can affect your metabolism. You may gain a few pounds after you quit. The following can help you control your weight:  Eat healthy foods.    Drink water before, during, and between meals.    Exercise as directed.      Weight Management   Why it is important to manage your weight:  Being overweight increases your risk of health conditions such as heart disease, high blood pressure, type 2 diabetes, and certain types of cancer. It can also increase your risk for osteoarthritis, sleep apnea, and other respiratory problems. Aim for a slow, steady weight loss. Even a small amount of weight loss can lower your risk of health problems.  How to lose weight safely:  A safe and healthy way to lose weight is to eat fewer calories and get regular exercise. You can lose up about 1 pound a week by decreasing the number of calories you eat by 500 calories each day.   Healthy meal plan for weight management:  A healthy meal plan includes a variety of foods, contains fewer calories, and helps you stay healthy. A healthy meal plan includes the following:  Eat whole-grain foods more often.  A healthy meal plan should contain fiber.  Fiber is the part of grains, fruits, and vegetables that is not broken down by your body. Whole-grain foods are healthy and provide extra fiber in your diet. Some examples of whole-grain foods are whole-wheat breads and pastas, oatmeal, brown rice, and bulgur.  Eat a variety of vegetables every day.  Include dark, leafy greens such as spinach, kale, rebeka greens, and mustard greens. Eat yellow and orange vegetables such as carrots, sweet potatoes, and winter squash.   Eat a variety of fruits every day.  Choose fresh or canned fruit (canned in its own juice or light syrup) instead of juice. Fruit juice has very little or no fiber.  Eat low-fat dairy foods.  Drink fat-free (skim) milk or 1% milk. Eat fat-free yogurt and low-fat cottage cheese. Try low-fat cheeses such as mozzarella and other reduced-fat cheeses.  Choose meat and other protein foods that are low in fat.  Choose beans or other legumes such as split peas or lentils. Choose fish, skinless poultry (chicken or turkey), or lean cuts of red meat (beef or pork). Before you cook meat or poultry, cut off any visible fat.   Use less fat and oil.  Try baking foods instead of frying them. Add less fat, such as margarine, sour cream, regular salad dressing and mayonnaise to foods. Eat fewer high-fat foods. Some examples of high-fat foods include french fries, doughnuts, ice cream, and cakes.  Eat fewer sweets.  Limit foods and drinks that are high in sugar. This includes candy, cookies, regular soda, and sweetened drinks.  Exercise:  Exercise at least 30 minutes per day on most days of the week. Some examples of exercise include walking, biking, dancing, and swimming. You can also fit in more physical activity by taking the stairs instead of the elevator or parking farther away from stores. Ask your healthcare provider about the best exercise plan for you.    © Copyright [employer] 2018 Information is for End User's use only and may not be sold, redistributed  or otherwise used for commercial purposes. All illustrations and images included in CareNotes® are the copyrighted property of [employer] or [employer]

## 2025-06-17 NOTE — ASSESSMENT & PLAN NOTE
Due to due to hereditary spastic hemiplegia.  Patient was seen by Neurology.  There were recommendations for physical therapy which the patient declined.  Patient does ambulate with the assistance of a walker

## 2025-06-17 NOTE — ASSESSMENT & PLAN NOTE
Check repeat lipid panel.  Patient does need to cut back on his ice cream  Orders:  •  Comprehensive metabolic panel; Future  •  Lipid panel; Future

## 2025-06-23 ENCOUNTER — APPOINTMENT (OUTPATIENT)
Dept: LAB | Facility: CLINIC | Age: 80
End: 2025-06-23
Payer: MEDICARE

## 2025-06-23 ENCOUNTER — RESULTS FOLLOW-UP (OUTPATIENT)
Dept: FAMILY MEDICINE CLINIC | Facility: CLINIC | Age: 80
End: 2025-06-23

## 2025-06-23 DIAGNOSIS — I10 BENIGN ESSENTIAL HYPERTENSION: ICD-10-CM

## 2025-06-23 DIAGNOSIS — G11.4 HEREDITARY SPASTIC PARAPLEGIA (HCC): ICD-10-CM

## 2025-06-23 DIAGNOSIS — E78.00 HYPERCHOLESTEROLEMIA: ICD-10-CM

## 2025-06-23 DIAGNOSIS — Z00.00 MEDICARE ANNUAL WELLNESS VISIT, SUBSEQUENT: ICD-10-CM

## 2025-06-23 DIAGNOSIS — R73.09 ABNORMAL GLUCOSE: ICD-10-CM

## 2025-06-23 DIAGNOSIS — E78.00 HYPERCHOLESTEROLEMIA: Primary | ICD-10-CM

## 2025-06-23 LAB
ALBUMIN SERPL BCG-MCNC: 4 G/DL (ref 3.5–5)
ALP SERPL-CCNC: 72 U/L (ref 34–104)
ALT SERPL W P-5'-P-CCNC: 25 U/L (ref 7–52)
ANION GAP SERPL CALCULATED.3IONS-SCNC: 10 MMOL/L (ref 4–13)
AST SERPL W P-5'-P-CCNC: 19 U/L (ref 13–39)
BASOPHILS # BLD AUTO: 0.02 THOUSANDS/ÂΜL (ref 0–0.1)
BASOPHILS NFR BLD AUTO: 0 % (ref 0–1)
BILIRUB SERPL-MCNC: 0.82 MG/DL (ref 0.2–1)
BUN SERPL-MCNC: 17 MG/DL (ref 5–25)
CALCIUM SERPL-MCNC: 8.6 MG/DL (ref 8.4–10.2)
CHLORIDE SERPL-SCNC: 106 MMOL/L (ref 96–108)
CHOLEST SERPL-MCNC: 139 MG/DL (ref ?–200)
CO2 SERPL-SCNC: 24 MMOL/L (ref 21–32)
CREAT SERPL-MCNC: 1.16 MG/DL (ref 0.6–1.3)
EOSINOPHIL # BLD AUTO: 0.22 THOUSAND/ÂΜL (ref 0–0.61)
EOSINOPHIL NFR BLD AUTO: 3 % (ref 0–6)
ERYTHROCYTE [DISTWIDTH] IN BLOOD BY AUTOMATED COUNT: 13.4 % (ref 11.6–15.1)
EST. AVERAGE GLUCOSE BLD GHB EST-MCNC: 134 MG/DL
GFR SERPL CREATININE-BSD FRML MDRD: 58 ML/MIN/1.73SQ M
GLUCOSE P FAST SERPL-MCNC: 120 MG/DL (ref 65–99)
HBA1C MFR BLD: 6.3 %
HCT VFR BLD AUTO: 47.7 % (ref 36.5–49.3)
HDLC SERPL-MCNC: 31 MG/DL
HGB BLD-MCNC: 16.1 G/DL (ref 12–17)
IMM GRANULOCYTES # BLD AUTO: 0.08 THOUSAND/UL (ref 0–0.2)
IMM GRANULOCYTES NFR BLD AUTO: 1 % (ref 0–2)
LDLC SERPL CALC-MCNC: 90 MG/DL (ref 0–100)
LYMPHOCYTES # BLD AUTO: 2.12 THOUSANDS/ÂΜL (ref 0.6–4.47)
LYMPHOCYTES NFR BLD AUTO: 27 % (ref 14–44)
MCH RBC QN AUTO: 29.5 PG (ref 26.8–34.3)
MCHC RBC AUTO-ENTMCNC: 33.8 G/DL (ref 31.4–37.4)
MCV RBC AUTO: 87 FL (ref 82–98)
MONOCYTES # BLD AUTO: 0.61 THOUSAND/ÂΜL (ref 0.17–1.22)
MONOCYTES NFR BLD AUTO: 8 % (ref 4–12)
NEUTROPHILS # BLD AUTO: 4.7 THOUSANDS/ÂΜL (ref 1.85–7.62)
NEUTS SEG NFR BLD AUTO: 61 % (ref 43–75)
NONHDLC SERPL-MCNC: 108 MG/DL
NRBC BLD AUTO-RTO: 0 /100 WBCS
PLATELET # BLD AUTO: 211 THOUSANDS/UL (ref 149–390)
PMV BLD AUTO: 10.6 FL (ref 8.9–12.7)
POTASSIUM SERPL-SCNC: 4 MMOL/L (ref 3.5–5.3)
PROT SERPL-MCNC: 6.8 G/DL (ref 6.4–8.4)
RBC # BLD AUTO: 5.46 MILLION/UL (ref 3.88–5.62)
SODIUM SERPL-SCNC: 140 MMOL/L (ref 135–147)
TRIGL SERPL-MCNC: 90 MG/DL (ref ?–150)
TSH SERPL DL<=0.05 MIU/L-ACNC: 2.49 UIU/ML (ref 0.45–4.5)
WBC # BLD AUTO: 7.75 THOUSAND/UL (ref 4.31–10.16)

## 2025-06-23 PROCEDURE — 80053 COMPREHEN METABOLIC PANEL: CPT

## 2025-06-23 PROCEDURE — 85025 COMPLETE CBC W/AUTO DIFF WBC: CPT

## 2025-06-23 PROCEDURE — 80061 LIPID PANEL: CPT

## 2025-06-23 PROCEDURE — 84443 ASSAY THYROID STIM HORMONE: CPT

## 2025-06-23 PROCEDURE — 83036 HEMOGLOBIN GLYCOSYLATED A1C: CPT

## 2025-06-23 PROCEDURE — 36415 COLL VENOUS BLD VENIPUNCTURE: CPT

## 2025-07-08 ENCOUNTER — CONSULT (OUTPATIENT)
Dept: SURGERY | Facility: CLINIC | Age: 80
End: 2025-07-08
Payer: MEDICARE

## 2025-07-08 ENCOUNTER — TELEPHONE (OUTPATIENT)
Age: 80
End: 2025-07-08

## 2025-07-08 VITALS — BODY MASS INDEX: 26.44 KG/M2 | WEIGHT: 206 LBS | HEIGHT: 74 IN

## 2025-07-08 DIAGNOSIS — L72.3 SEBACEOUS CYST: ICD-10-CM

## 2025-07-08 PROCEDURE — 99202 OFFICE O/P NEW SF 15 MIN: CPT | Performed by: SURGERY

## 2025-07-08 NOTE — TELEPHONE ENCOUNTER
That is not what was documented when Dr. Cortez saw him today.  It was documented that they felt that the lump on his back is a sebaceous cyst and offered him the choice of observation versus excising it.  According to their note he is choosing observation as opposed to having it definitively removed.  There is no mention of the need for dermatology

## 2025-07-08 NOTE — TELEPHONE ENCOUNTER
I spoke with Wendy and she stated that this is not related to his back, it was for his ear.  Dr. Cortez stated that he should see Derm for his ear and not the ENT that you recommended.  He is scheduled with the ENT next week and I advised her to keep that appointment.

## 2025-07-08 NOTE — PROGRESS NOTES
"Name: Neri Andrews Sr.      : 1945      MRN: [id number]  Encounter Provider: Lee Cortez MD  Encounter Date: 2025   Encounter department: [facility] [place]  :  Assessment & Plan  Sebaceous cyst    Orders:    Ambulatory Referral to General Surgery        History of Present Illness   HPI  Neri Andrews Sr. is a 79 y.o. male who presents for evaluation of a back lump.  Denies pain.  It has been present for many years.  No history for infection.    Examination reveals a 3.5 cm subcutaneous mass of the left mid back.  Is most consistent with a sebaceous cyst.    Observation versus excision was discussed.  At this time he prefers observation.  I believe that this is reasonable.  All questions were answered.    He does have a appointment to evaluate dermatology for various skin lesions.      Review of Systems   Constitutional: Negative.  Negative for activity change.   HENT: Negative.     Eyes: Negative.    Respiratory: Negative.     Cardiovascular: Negative.    Gastrointestinal: Negative.    Endocrine: Negative.    Genitourinary: Negative.    Musculoskeletal:  Positive for gait problem.   Skin:  Positive for color change.   Allergic/Immunologic: Negative.    Psychiatric/Behavioral:  Negative for agitation, behavioral problems and confusion. The patient is not nervous/anxious.           Objective   Ht 6' 2\" (1.88 m)   Wt 93.4 kg (206 lb)   BMI 26.45 kg/m²      Physical Exam    Cardiovascular:      Rate and Rhythm: Normal rate.   Pulmonary:      Effort: Pulmonary effort is normal.     Skin:     General: Skin is warm and dry.      Comments: 2.5 cm subcutaneous mass over left mid back.  Most consistent with sebaceous cyst.           "

## 2025-07-08 NOTE — TELEPHONE ENCOUNTER
Wendy (sister in law) called in on behalf of the patient stating that General Surgery advised them Neri needs to see a Dermatologist before they see him. Dermatology referrals in the chart are closed/. Please advise. Thank you.

## 2025-07-09 NOTE — TELEPHONE ENCOUNTER
No, I wanted him to see the ear nose and throat for his ear.  Not dermatology.  Keep the appointment with ENT.

## 2025-07-10 ENCOUNTER — OFFICE VISIT (OUTPATIENT)
Dept: FAMILY MEDICINE CLINIC | Facility: CLINIC | Age: 80
End: 2025-07-10
Payer: MEDICARE

## 2025-07-10 VITALS
WEIGHT: 208 LBS | DIASTOLIC BLOOD PRESSURE: 78 MMHG | SYSTOLIC BLOOD PRESSURE: 126 MMHG | HEIGHT: 74 IN | OXYGEN SATURATION: 97 % | RESPIRATION RATE: 16 BRPM | HEART RATE: 88 BPM | TEMPERATURE: 97.3 F | BODY MASS INDEX: 26.69 KG/M2

## 2025-07-10 DIAGNOSIS — R60.0 PEDAL EDEMA: ICD-10-CM

## 2025-07-10 DIAGNOSIS — L24.9 IRRITANT CONTACT DERMATITIS, UNSPECIFIED TRIGGER: Primary | ICD-10-CM

## 2025-07-10 PROBLEM — Z00.00 MEDICARE ANNUAL WELLNESS VISIT, SUBSEQUENT: Status: RESOLVED | Noted: 2018-07-18 | Resolved: 2025-07-10

## 2025-07-10 PROBLEM — Z23 NEED FOR SHINGLES VACCINE: Status: RESOLVED | Noted: 2018-07-18 | Resolved: 2025-07-10

## 2025-07-10 PROBLEM — Z99.89 USES WALKER: Status: RESOLVED | Noted: 2025-06-17 | Resolved: 2025-07-10

## 2025-07-10 PROCEDURE — 99213 OFFICE O/P EST LOW 20 MIN: CPT

## 2025-07-10 RX ORDER — CLOBETASOL PROPIONATE 0.5 MG/G
CREAM TOPICAL 2 TIMES DAILY
Qty: 30 G | Refills: 0 | Status: SHIPPED | OUTPATIENT
Start: 2025-07-10 | End: 2025-07-10

## 2025-07-10 RX ORDER — CLOBETASOL PROPIONATE 0.5 MG/G
CREAM TOPICAL 2 TIMES DAILY
Qty: 30 G | Refills: 0 | Status: SHIPPED | OUTPATIENT
Start: 2025-07-10

## 2025-07-10 NOTE — PROGRESS NOTES
Name: Neri Andrews Sr.      : 1945      MRN: [id number]  Encounter Provider: TRAVIS Garcia  Encounter Date: 7/10/2025   Encounter department: [facility]  :  Assessment & Plan  Irritant contact dermatitis, unspecified trigger  Suspecting contact dermatitis - unknown source. Relates he was working out in the yard recently. Denies any other known triggers. Will trial topical clobetasol and f/u as needed. Disucssed   Orders:    clobetasol (TEMOVATE) 0.05 % cream; Apply topically 2 (two) times a day            Pedal edema  Advised could be secondary to Amlodipine - has been on medication for several years. Denies SOB. Recommend elevating feet, limiting salt, and using compression socks. Will reach out to PCP regarding medication adjustments. Last CMP done last week - normal results. /78 today.              History of Present Illness   79 year old male presents for evaluation of rash to both arms ongoing x 2-3 weeks. Denies any known triggers but reports starting shortly after having blood work done - blood work was done in his right hand. He also admits he was working out in the yard for about 30 minutes within the past two weeks. Reports rash is itchy but not painful. Denies fevers, body aches, chills, discharges. Also mentions ongoing leg swelling - denies CP or SOB. Reports he does not wear compression socks or elevate his legs. Does take Amlodipine-Benzapril for HTN - has been on for many years. Has tried topical cortisone for rash, has not tried any interventions for the swelling in his feet.    Rash  This is a new problem. The current episode started 1 to 4 weeks ago. The problem is unchanged. The affected locations include the left arm and right arm. The problem is moderate. The rash is characterized by dryness, itchiness and redness. It is unknown if there was an exposure to a precipitant. The rash first occurred at home. Associated symptoms include itching.  "Pertinent negatives include no anorexia, congestion, cough, decreased physical activity, decreased responsiveness, decreased sleep, drinking less, diarrhea, facial edema, fatigue, fever, joint pain, rhinorrhea, shortness of breath, sore throat or vomiting. Past treatments include anti-itch cream. The treatment provided mild relief. There is no history of allergies, asthma, eczema or varicella. There were no sick contacts.     Review of Systems   Constitutional:  Negative for activity change, appetite change, chills, decreased responsiveness, fatigue and fever.   HENT:  Negative for congestion, ear pain, rhinorrhea and sore throat.    Eyes:  Negative for pain and visual disturbance.   Respiratory:  Negative for cough, chest tightness and shortness of breath.    Cardiovascular:  Positive for leg swelling. Negative for chest pain and palpitations.   Gastrointestinal:  Negative for abdominal pain, anorexia, constipation, diarrhea, nausea and vomiting.   Genitourinary:  Negative for dysuria and hematuria.   Musculoskeletal:  Negative for arthralgias, back pain, joint pain, myalgias and neck pain.   Skin:  Positive for color change, itching and rash. Negative for pallor and wound.   Allergic/Immunologic: Negative for environmental allergies and food allergies.   Neurological:  Negative for dizziness, seizures, syncope, light-headedness and headaches.   All other systems reviewed and are negative.      Objective   /78   Pulse 88   Temp (!) 97.3 °F (36.3 °C) (Tympanic)   Resp 16   Ht 6' 2\" (1.88 m)   Wt 94.3 kg (208 lb)   SpO2 97%   BMI 26.71 kg/m²      Physical Exam  Vitals and nursing note reviewed.   Constitutional:       General: He is awake. He is not in acute distress.     Appearance: Normal appearance. He is well-developed and normal weight.   HENT:      Head: Normocephalic and atraumatic.     Cardiovascular:      Rate and Rhythm: Normal rate and regular rhythm.      Pulses: Normal pulses.      Heart " sounds: Normal heart sounds. No murmur heard.  Pulmonary:      Effort: Pulmonary effort is normal. No respiratory distress.      Breath sounds: Normal breath sounds.   Abdominal:      Palpations: Abdomen is soft.      Tenderness: There is no abdominal tenderness.     Musculoskeletal:         General: Swelling present.      Cervical back: Neck supple.      Right lower le+ Pitting Edema present.      Left lower le+ Pitting Edema present.     Skin:     General: Skin is warm and dry.      Capillary Refill: Capillary refill takes less than 2 seconds.      Findings: Rash present. Rash is macular.      Comments: Macular rash to both forearms      Neurological:      General: No focal deficit present.      Mental Status: He is alert and oriented to person, place, and time.     Psychiatric:         Mood and Affect: Mood normal.         Behavior: Behavior normal. Behavior is cooperative.         Thought Content: Thought content normal.         Judgment: Judgment normal.

## 2025-08-22 DIAGNOSIS — I10 BENIGN ESSENTIAL HYPERTENSION: ICD-10-CM

## 2025-08-22 RX ORDER — AMLODIPINE AND BENAZEPRIL HYDROCHLORIDE 10; 40 MG/1; MG/1
1 CAPSULE ORAL DAILY
Qty: 90 CAPSULE | Refills: 0 | Status: SHIPPED | OUTPATIENT
Start: 2025-08-22